# Patient Record
Sex: FEMALE | Race: WHITE | NOT HISPANIC OR LATINO | Employment: OTHER | URBAN - NONMETROPOLITAN AREA
[De-identification: names, ages, dates, MRNs, and addresses within clinical notes are randomized per-mention and may not be internally consistent; named-entity substitution may affect disease eponyms.]

---

## 2017-07-26 ENCOUNTER — TRANSCRIBE ORDERS (OUTPATIENT)
Dept: GENERAL RADIOLOGY | Facility: HOSPITAL | Age: 82
End: 2017-07-26

## 2017-07-26 ENCOUNTER — HOSPITAL ENCOUNTER (OUTPATIENT)
Dept: GENERAL RADIOLOGY | Facility: HOSPITAL | Age: 82
Discharge: HOME | End: 2017-07-26
Admitting: PAIN MEDICINE

## 2017-07-26 ENCOUNTER — HOSPITAL ENCOUNTER (OUTPATIENT)
Dept: GENERAL RADIOLOGY | Facility: HOSPITAL | Age: 82
Discharge: HOME | End: 2017-07-26
Admitting: INTERNAL MEDICINE

## 2017-07-26 DIAGNOSIS — M51.36 DEGENERATION OF LUMBAR INTERVERTEBRAL DISC: ICD-10-CM

## 2017-07-26 DIAGNOSIS — M47.816 FACET DEGENERATION OF LUMBAR REGION: Primary | ICD-10-CM

## 2017-07-26 DIAGNOSIS — Z78.0 ASYMPTOMATIC POSTMENOPAUSAL STATUS (AGE-RELATED) (NATURAL): Primary | ICD-10-CM

## 2017-07-26 PROCEDURE — 72110 X-RAY EXAM L-2 SPINE 4/>VWS: CPT

## 2017-08-25 ENCOUNTER — OFFICE VISIT (OUTPATIENT)
Dept: PODIATRY | Facility: CLINIC | Age: 82
End: 2017-08-25

## 2017-08-25 VITALS
HEART RATE: 68 BPM | BODY MASS INDEX: 28.48 KG/M2 | SYSTOLIC BLOOD PRESSURE: 122 MMHG | DIASTOLIC BLOOD PRESSURE: 70 MMHG | WEIGHT: 132 LBS | HEIGHT: 57 IN

## 2017-08-25 DIAGNOSIS — G61.0 GUILLAIN BARRÉ SYNDROME (HCC): ICD-10-CM

## 2017-08-25 DIAGNOSIS — G62.89 OTHER POLYNEUROPATHY: Primary | ICD-10-CM

## 2017-08-25 PROCEDURE — 99203 OFFICE O/P NEW LOW 30 MIN: CPT | Performed by: PODIATRIST

## 2017-08-25 RX ORDER — ALPRAZOLAM 0.25 MG/1
0.25 TABLET ORAL 3 TIMES DAILY PRN
COMMUNITY
End: 2019-09-05 | Stop reason: ALTCHOICE

## 2017-08-25 NOTE — PATIENT INSTRUCTIONS
Peripheral Neuropathy  Peripheral neuropathy is a type of nerve damage. It affects nerves that carry signals between the spinal cord and other parts of the body. These are called peripheral nerves. With peripheral neuropathy, one nerve or a group of nerves may be damaged.   CAUSES   Many things can damage peripheral nerves. For some people with peripheral neuropathy, the cause is unknown. Some causes include:  · Diabetes. This is the most common cause of peripheral neuropathy.  · Injury to a nerve.  · Pressure or stress on a nerve that lasts a long time.  · Too little vitamin B. Alcoholism can lead to this.  · Infections.  · Autoimmune diseases, such as multiple sclerosis and systemic lupus erythematosus.  · Inherited nerve diseases.  · Some medicines, such as cancer drugs.  · Toxic substances, such as lead and mercury.  · Too little blood flowing to the legs.  · Kidney disease.  · Thyroid disease.  SIGNS AND SYMPTOMS   Different people have different symptoms. The symptoms you have will depend on which of your nerves is damaged.  Common symptoms include:  · Loss of feeling (numbness) in the feet and hands.  · Tingling in the feet and hands.  · Pain that burns.  · Very sensitive skin.  · Weakness.  · Not being able to move a part of the body (paralysis).  · Muscle twitching.  · Clumsiness or poor coordination.  · Loss of balance.  · Not being able to control your bladder.  · Feeling dizzy.  · Sexual problems.  DIAGNOSIS   Peripheral neuropathy is a symptom, not a disease. Finding the cause of peripheral neuropathy can be hard. To figure that out, your health care provider will take a medical history and do a physical exam. A neurological exam will also be done. This involves checking things affected by your brain, spinal cord, and nerves (nervous system). For example, your health care provider will check your reflexes, how you move, and what you can feel.   Other types of tests may also be ordered, such as:  · Blood  tests.  · A test of the fluid in your spinal cord.  · Imaging tests, such as CT scans or an MRI.  · Electromyography (EMG). This test checks the nerves that control muscles.  · Nerve conduction velocity tests. These tests check how fast messages pass through your nerves.  · Nerve biopsy. A small piece of nerve is removed. It is then checked under a microscope.  TREATMENT   · Medicine is often used to treat peripheral neuropathy. Medicines may include:    Pain-relieving medicines. Prescription or over-the-counter medicine may be suggested.    Antiseizure medicine. This may be used for pain.    Antidepressants. These also may help ease pain from neuropathy.    Lidocaine. This is a numbing medicine. You might wear a patch or be given a shot.    Mexiletine. This medicine is typically used to help control irregular heart rhythms.  · Surgery. Surgery may be needed to relieve pressure on a nerve or to destroy a nerve that is causing pain.  · Physical therapy to help movement.  · Assistive devices to help movement.  HOME CARE INSTRUCTIONS   · Only take over-the-counter or prescription medicines as directed by your health care provider. Follow the instructions carefully for any given medicines. Do not take any other medicines without first getting approval from your health care provider.  · If you have diabetes, work closely with your health care provider to keep your blood sugar under control.  · If you have numbness in your feet:  ¨ Check every day for signs of injury or infection. Watch for redness, warmth, and swelling.  ¨ Wear padded socks and comfortable shoes. These help protect your feet.  · Do not do things that put pressure on your damaged nerve.  · Do not smoke. Smoking keeps blood from getting to damaged nerves.  · Avoid or limit alcohol. Too much alcohol can cause a lack of B vitamins. These vitamins are needed for healthy nerves.  · Develop a good support system. Coping with peripheral neuropathy can be  stressful. Talk to a mental health specialist or join a support group if you are struggling.  · Follow up with your health care provider as directed.  SEEK MEDICAL CARE IF:   · You have new signs or symptoms of peripheral neuropathy.  · You are struggling emotionally from dealing with peripheral neuropathy.  · You have a fever.  SEEK IMMEDIATE MEDICAL CARE IF:   · You have an injury or infection that is not healing.  · You feel very dizzy or begin vomiting.  · You have chest pain.  · You have trouble breathing.     This information is not intended to replace advice given to you by your health care provider. Make sure you discuss any questions you have with your health care provider.     Document Released: 12/08/2003 Document Revised: 04/10/2017 Document Reviewed: 08/25/2014  [employer] Interactive Patient Education ©2017 [employer]  Guillain-Cameron Syndrome  Guillain-Cameron syndrome (GBS) is a rare disorder in which your body's defense (immune) system attacks your nervous system. GBS is a kind of autoimmune disorder. GBS is not contagious and most people with GBS recover within a few months. However, some people may still have some weakness after a few years.   CAUSES   The exact cause of GBS syndrome is not known. The disorder usually develops a few days or weeks after a viral infection, such as a stomach (gastrointestinal) or breathing (respiratory) infection. Sometimes surgery or vaccinations will trigger the syndrome.   SIGNS AND SYMPTOMS   The most common signs and symptoms of GBS are tingling, numbness, and weakness in your lower legs. You may have more symptoms in other areas of your body after a few weeks. Signs and symptoms may eventually include:  · Muscle weakness that spreads from your legs to your arms and trunk.  · Difficulty breathing.  · Total loss of muscle use.  · Facial weakness.  · Double vision.  · Trouble swallowing.  · Slurred speech.  · Aching or burning pain.  · Rapid breathing and heart  rate.  · Flushing or cold and clammy skin.  · Dizziness when standing.  · Trouble passing urine or having bowel movements.  DIAGNOSIS   Your health care provider will do a physical exam to diagnose GBS. You may also have tests to confirm GBS. These may include:  · A nerve conduction velocity (NCV) test to check for slowing of nerve signals.  · A spinal tap to check for protein in the fluid around the spinal cord.  TREATMENT   Treatment focuses on relieving symptoms and speeding up recovery. The most important part of treatment is to keep your body functioning while your nervous system recovers. Severe cases of GBS may require hospitalization. Possible treatments include:   · Plasmapheresis. This is a type of blood transfusion. It removes immune system cells that are attacking your nervous system.  · Intravenous injections of special proteins (immunoglobulins or antibodies). These proteins may slow down the immune system's attack on peripheral nerves.  · Medicines to control the symptoms of GBS.  HOME CARE INSTRUCTIONS  · Physical therapy may be recommended by your health care provider. Follow your exercises as directed by your physical therapist.  · Make sure you have the support you need.  · Keep all follow-up visits as directed by your health care provider. This is important.  · Take medicines only as directed by your health care provider.  SEEK MEDICAL CARE IF:  · You have new symptoms or your symptoms get worse.  · You do not feel safe or supported at home.  SEEK IMMEDIATE MEDICAL CARE IF:  · You have trouble breathing.  · You have trouble swallowing.  · You choke after eating or drinking.  · You cannot move.  · You pass out.    MAKE SURE YOU:  · Understand these instructions.  · Watch your condition.  · Get help right away if you are not doing well or get worse.     This information is not intended to replace advice given to you by your health care provider. Make sure you discuss any questions you have with  your health care provider.     Document Released: 12/08/2003 Document Revised: 01/08/2016 Document Reviewed: 02/18/2015  [employer] Interactive Patient Education ©2017 [employer]

## 2017-08-25 NOTE — PROGRESS NOTES
[facility] - PODIATRY    Today's Date: 08/25/17    Patient Name: Isha Mccall  MRN: [id number]  CSN: [id number]  PCP: Javi Wilson MD  Referring Provider: No ref. provider found    SUBJECTIVE     Chief Complaint   Patient presents with   • Foot Pain     Complaint of bilateral foot pain     HPI: Isha Mccall, a 82 y.o.female, comes to clinic as a(n) new patient complaining of foot pain. Patient has h/o COPD, Guillain-[place], CAD, HTN, Tobacco use. States that for several years she has had numbness and tingling in her feet. No difference in pain with WB or NWB. States that she currently takes Gabapentin which helps with her pain but between pills has increased tingling. Admits pain at 6/10 level and described as numbness and tingling. Denies any constitutional symptoms. No other pedal complaints at this time.    Past Medical History:   Diagnosis Date   • COPD (chronic obstructive pulmonary disease)    • Coronary artery disease    • Foot pain, bilateral    • Hypertension    • Kidney cysts    • Tobacco abuse      Past Surgical History:   Procedure Laterality Date   • CORONARY ANGIOPLASTY WITH STENT PLACEMENT     • KNEE SURGERY Right      Family History   Problem Relation Age of Onset   • Heart disease Other    • Hypertension Other      Social History     Social History   • Marital status:      Spouse name: N/A   • Number of children: N/A   • Years of education: N/A     Occupational History   • Not on file.     Social History Main Topics   • Smoking status: Current Every Day Smoker     Packs/day: 1.00   • Smokeless tobacco: Never Used   • Alcohol use No   • Drug use: No   • Sexual activity: Defer     Other Topics Concern   • Not on file     Social History Narrative     Allergies   Allergen Reactions   • Lisinopril    • Penicillins      Current Outpatient Prescriptions   Medication Sig Dispense Refill   • ALPRAZolam (XANAX) 0.25 MG tablet Take 0.25 mg by mouth As Needed for Anxiety.      • carvedilol (COREG) 25 MG tablet Take 25 mg by mouth 2 (Two) Times a Day With Meals.     • cyclobenzaprine (FLEXERIL) 10 MG tablet Take 10 mg by mouth 3 (Three) Times a Day As Needed for Muscle Spasms.     • furosemide (LASIX) 20 MG tablet Take 20 mg by mouth 2 (Two) Times a Day.     • gabapentin (NEURONTIN) 600 MG tablet Take 600 mg by mouth 3 (Three) Times a Day.     • hydrochlorothiazide (HYDRODIURIL) 12.5 MG tablet Take 12.5 mg by mouth Daily.     • ipratropium-albuterol (COMBIVENT RESPIMAT)  MCG/ACT inhaler Inhale 1 puff.       No current facility-administered medications for this visit.      Review of Systems   Constitutional: Negative for chills and fever.   HENT: Negative for congestion.    Respiratory: Negative for shortness of breath.    Cardiovascular: Negative for chest pain and leg swelling.   Gastrointestinal: Negative for constipation, diarrhea, nausea and vomiting.   Skin: Negative for wound.   Neurological: Positive for numbness.       OBJECTIVE     Vitals:    08/25/17 1024   BP: 122/70   Pulse: 68       PHYSICAL EXAM  GEN:   A&Ox3, NAD. Pt presents to clinic ambulating with walker and wearing Casual Shoes.      NEURO:   Protective sensation intact to 10/10 sites Right foot, 9/10 site Left foot using [place] monofilament  Light touch sensation diminished  No Tinel's or [place] sign.    VASC:  Skin temperature Warm to Warm proximal to distal ghazal  DP pulses 2/4 Right, 2/4 Left  PT pulses 2/4 Right, 2/4 Left  CFT 4 sec ghazal  Pedal hair growth absent    MUSC/SKEL:  Muscle Strength Right foot Dorsiflexors 5/5, Plantarflexors 5/5, Evertors 5/5, Invertors 5/5  Muscle Strength Left foot Dorsiflexors 5/5, Plantarflexors 5/5, Evertors 5/5, Invertors 5/5  ROM of the 1st MTP is full without pain or crepitus  ROM of the MTJ is full without pain or crepitus    ROM of the STJ is full without pain or crepitus    ROM of the ankle joint is full without pain or crepitus    No pinpoint POP during exam.  Points to plantar foot to site of numbness and tingling   Rectus foot type   Gait pattern: Normal    DERM:  Pedal nails x10 are thickened and discolored but within normal limits of length  Webspaces are Clean, Dry, and Intact  Skin is normal in turgor and texture with no open wounds or sores appreciated.      RADIOLOGY/NUCLEAR:  Dexa Bone Density Axial    Result Date: 7/26/2017  Narrative: EXAMINATION: DEXA BONE DENSITY AXIAL-  7/26/2017 3:50 PM CDT  History:Osteoporosis screening  Bone densitometry has been performed using a Holo[employer]est[employer] Discovery C Model bone densitometer. The routine evaluation includes the lumbar spine and left hip.  The calculated bone density of the femoral neck is 0.533 g/cm2 which corresponds to a T-score of  -2.8 and a Z-score of -0.5.  The T-score corresponds to the number of standard deviations above or below the standard of a 30 year old patient.  The Z-score refers to the number of standard deviations above or below the mean for a person of the same age as this patient.  Evaluation of the lumbar spine demonstrates an average bone mineral density measurement of 0.977 g/cm2 which corresponds to a T-score of -0.6 and a Z-score of 2.1  Comparison: None      Impression: Impression:  1. Osteoporosis. The bone density is more than 2.5 standard deviations below the mean for a young adult woman. There is significant increased fracture risk. This report was finalized on 07/26/2017 15:51 by Dr. Jose Antonio León MD.    Xr Spine Lumbar 4+ View    Result Date: 7/26/2017  Narrative: EXAMINATION: XR SPINE LUMBAR 4+ VW-  7/26/2017 3:51 PM CDT  HISTORY: Back pain  COMPARISON:None  TECHNIQUE:5 views  FINDINGS:  Osteoporosis observed.  There is mild dextroscoliotic change of the lumbar spine.  There is endplate sclerosis and osteophyte formation with mild disc space narrowing particularly in the lower lumbar spine, L4-L5 and L5-S1.  Very mild anterolisthesis of L4 on L5 suspected.  There is no acute fracture or  subluxation.  Facet arthropathy with hypertrophic changes observed particularly in the lower lumbar spine, L5-S1 and L4-L5.  Atherosclerotic aortic calcifications observed      Impression: 1. Osteoporosis. 2. Scoliotic and degenerative changes. 3. No acute bony abnormality This report was finalized on 07/26/2017 15:52 by Dr. Jose Antonio León MD.      LABORATORY/CULTURE RESULTS:      PATHOLOGY RESULTS:       ASSESSMENT/PLAN     Isha was seen today for foot pain.    Diagnoses and all orders for this visit:    Other polyneuropathy    Guillain Barré syndrome    Comprehensive lower extremity examination and evaluation was performed.  Discussed findings and treatment plan including risks, benefits, and treatment options with patient in detail. Patient agreed with treatment plan.  Explained to patient that neuropathy can be difficult to treat and in the presence of Buillain [place] may not be treatable   Rx: Compounded pain cream  An After Visit Summary was printed and given to the patient at discharge, including (if requested) any available informative/educational handouts regarding diagnosis, treatment, or medications. All questions were answered to patient/family satisfaction. Should symptoms fail to improve or worsen they agree to call or return to clinic or to go to the Emergency Department. Discussed the importance of following up with any needed screening tests/labs/specialist appointments and any requested follow-up recommended by me today. Importance of maintaining follow-up discussed and patient accepts that missed appointments can delay diagnosis and potentially lead to worsening of conditions.  Return if symptoms worsen or fail to improve., or sooner if acute issues arise.        This document has been electronically signed by Chivo Wilson DPM on August 25, 2017 2:36 PM

## 2017-09-28 ENCOUNTER — TRANSCRIBE ORDERS (OUTPATIENT)
Dept: ADMINISTRATIVE | Facility: HOSPITAL | Age: 82
End: 2017-09-28

## 2017-09-28 DIAGNOSIS — Z12.31 ENCOUNTER FOR SCREENING MAMMOGRAM FOR MALIGNANT NEOPLASM OF BREAST: Primary | ICD-10-CM

## 2017-10-23 ENCOUNTER — APPOINTMENT (OUTPATIENT)
Dept: CT IMAGING | Facility: HOSPITAL | Age: 82
End: 2017-10-23

## 2017-10-23 ENCOUNTER — APPOINTMENT (OUTPATIENT)
Dept: GENERAL RADIOLOGY | Facility: HOSPITAL | Age: 82
End: 2017-10-23

## 2017-10-23 ENCOUNTER — HOSPITAL ENCOUNTER (EMERGENCY)
Facility: HOSPITAL | Age: 82
Discharge: HOME | End: 2017-10-23
Admitting: EMERGENCY MEDICINE

## 2017-10-23 VITALS
DIASTOLIC BLOOD PRESSURE: 71 MMHG | OXYGEN SATURATION: 96 % | WEIGHT: 125 LBS | HEIGHT: 57 IN | BODY MASS INDEX: 26.97 KG/M2 | SYSTOLIC BLOOD PRESSURE: 118 MMHG | RESPIRATION RATE: 16 BRPM | TEMPERATURE: 97.4 F | HEART RATE: 60 BPM

## 2017-10-23 DIAGNOSIS — R11.2 NAUSEA AND VOMITING, INTRACTABILITY OF VOMITING NOT SPECIFIED, UNSPECIFIED VOMITING TYPE: Primary | ICD-10-CM

## 2017-10-23 LAB
ALBUMIN SERPL-MCNC: 3.2 G/DL (ref 3.5–5)
ALBUMIN/GLOB SERPL: 1.1 G/DL (ref 1.1–2.5)
ALP SERPL-CCNC: 70 U/L (ref 24–120)
ALT SERPL W P-5'-P-CCNC: 18 U/L (ref 0–54)
AMYLASE SERPL-CCNC: <30 U/L (ref 30–110)
ANION GAP SERPL CALCULATED.3IONS-SCNC: 8 MMOL/L (ref 4–13)
AST SERPL-CCNC: 13 U/L (ref 7–45)
BASOPHILS # BLD AUTO: 0.02 10*3/MM3 (ref 0–0.2)
BASOPHILS NFR BLD AUTO: 0.2 % (ref 0–2)
BILIRUB SERPL-MCNC: 0.3 MG/DL (ref 0.1–1)
BILIRUB UR QL STRIP: NEGATIVE
BUN BLD-MCNC: 25 MG/DL (ref 5–21)
BUN/CREAT SERPL: 23.1 (ref 7–25)
CALCIUM SPEC-SCNC: 8.3 MG/DL (ref 8.4–10.4)
CHLORIDE SERPL-SCNC: 95 MMOL/L (ref 98–110)
CLARITY UR: CLEAR
CO2 SERPL-SCNC: 37 MMOL/L (ref 24–31)
COLOR UR: YELLOW
CREAT BLD-MCNC: 1.08 MG/DL (ref 0.5–1.4)
DEPRECATED RDW RBC AUTO: 52.7 FL (ref 40–54)
EOSINOPHIL # BLD AUTO: 0.17 10*3/MM3 (ref 0–0.7)
EOSINOPHIL NFR BLD AUTO: 2 % (ref 0–4)
ERYTHROCYTE [DISTWIDTH] IN BLOOD BY AUTOMATED COUNT: 15.1 % (ref 12–15)
GFR SERPL CREATININE-BSD FRML MDRD: 49 ML/MIN/1.73
GLOBULIN UR ELPH-MCNC: 3 GM/DL
GLUCOSE BLD-MCNC: 102 MG/DL (ref 70–100)
GLUCOSE UR STRIP-MCNC: NEGATIVE MG/DL
HCT VFR BLD AUTO: 48.2 % (ref 37–47)
HGB BLD-MCNC: 15.6 G/DL (ref 12–16)
HGB UR QL STRIP.AUTO: NEGATIVE
HOLD SPECIMEN: NORMAL
HOLD SPECIMEN: NORMAL
IMM GRANULOCYTES # BLD: 0.02 10*3/MM3 (ref 0–0.03)
IMM GRANULOCYTES NFR BLD: 0.2 % (ref 0–5)
KETONES UR QL STRIP: NEGATIVE
LEUKOCYTE ESTERASE UR QL STRIP.AUTO: NEGATIVE
LIPASE SERPL-CCNC: 38 U/L (ref 23–203)
LYMPHOCYTES # BLD AUTO: 2.09 10*3/MM3 (ref 0.72–4.86)
LYMPHOCYTES NFR BLD AUTO: 24.9 % (ref 15–45)
MCH RBC QN AUTO: 31.3 PG (ref 28–32)
MCHC RBC AUTO-ENTMCNC: 32.4 G/DL (ref 33–36)
MCV RBC AUTO: 96.8 FL (ref 82–98)
MONOCYTES # BLD AUTO: 0.72 10*3/MM3 (ref 0.19–1.3)
MONOCYTES NFR BLD AUTO: 8.6 % (ref 4–12)
NEUTROPHILS # BLD AUTO: 5.37 10*3/MM3 (ref 1.87–8.4)
NEUTROPHILS NFR BLD AUTO: 64.1 % (ref 39–78)
NITRITE UR QL STRIP: NEGATIVE
PH UR STRIP.AUTO: 6 [PH] (ref 5–8)
PLATELET # BLD AUTO: 226 10*3/MM3 (ref 130–400)
PMV BLD AUTO: 10.6 FL (ref 6–12)
POTASSIUM BLD-SCNC: 3.2 MMOL/L (ref 3.5–5.3)
PROT SERPL-MCNC: 6.2 G/DL (ref 6.3–8.7)
PROT UR QL STRIP: NEGATIVE
RBC # BLD AUTO: 4.98 10*6/MM3 (ref 4.2–5.4)
SODIUM BLD-SCNC: 140 MMOL/L (ref 135–145)
SP GR UR STRIP: >1.03 (ref 1–1.03)
UROBILINOGEN UR QL STRIP: ABNORMAL
WBC NRBC COR # BLD: 8.39 10*3/MM3 (ref 4.8–10.8)
WHOLE BLOOD HOLD SPECIMEN: NORMAL
WHOLE BLOOD HOLD SPECIMEN: NORMAL

## 2017-10-23 PROCEDURE — 80053 COMPREHEN METABOLIC PANEL: CPT | Performed by: PHYSICIAN ASSISTANT

## 2017-10-23 PROCEDURE — 25010000002 ONDANSETRON PER 1 MG: Performed by: PHYSICIAN ASSISTANT

## 2017-10-23 PROCEDURE — 83690 ASSAY OF LIPASE: CPT | Performed by: PHYSICIAN ASSISTANT

## 2017-10-23 PROCEDURE — 71010 HC CHEST PA OR AP: CPT

## 2017-10-23 PROCEDURE — 81003 URINALYSIS AUTO W/O SCOPE: CPT | Performed by: PHYSICIAN ASSISTANT

## 2017-10-23 PROCEDURE — 94799 UNLISTED PULMONARY SVC/PX: CPT

## 2017-10-23 PROCEDURE — 74177 CT ABD & PELVIS W/CONTRAST: CPT

## 2017-10-23 PROCEDURE — 94640 AIRWAY INHALATION TREATMENT: CPT

## 2017-10-23 PROCEDURE — 96374 THER/PROPH/DIAG INJ IV PUSH: CPT

## 2017-10-23 PROCEDURE — 0 IOPAMIDOL 61 % SOLUTION: Performed by: PHYSICIAN ASSISTANT

## 2017-10-23 PROCEDURE — 85025 COMPLETE CBC W/AUTO DIFF WBC: CPT | Performed by: PHYSICIAN ASSISTANT

## 2017-10-23 PROCEDURE — 82150 ASSAY OF AMYLASE: CPT | Performed by: PHYSICIAN ASSISTANT

## 2017-10-23 PROCEDURE — 99284 EMERGENCY DEPT VISIT MOD MDM: CPT

## 2017-10-23 PROCEDURE — 96361 HYDRATE IV INFUSION ADD-ON: CPT

## 2017-10-23 RX ORDER — ONDANSETRON 4 MG/1
4 TABLET, ORALLY DISINTEGRATING ORAL ONCE
Status: DISCONTINUED | OUTPATIENT
Start: 2017-10-23 | End: 2017-10-23

## 2017-10-23 RX ORDER — IPRATROPIUM BROMIDE AND ALBUTEROL SULFATE 2.5; .5 MG/3ML; MG/3ML
3 SOLUTION RESPIRATORY (INHALATION) ONCE
Status: COMPLETED | OUTPATIENT
Start: 2017-10-23 | End: 2017-10-23

## 2017-10-23 RX ORDER — ONDANSETRON 4 MG/1
4 TABLET, ORALLY DISINTEGRATING ORAL EVERY 8 HOURS PRN
Qty: 9 TABLET | Refills: 0 | Status: ON HOLD | OUTPATIENT
Start: 2017-10-23 | End: 2019-04-05

## 2017-10-23 RX ORDER — ONDANSETRON 2 MG/ML
INJECTION INTRAMUSCULAR; INTRAVENOUS
Status: DISCONTINUED
Start: 2017-10-23 | End: 2017-10-24 | Stop reason: HOSPADM

## 2017-10-23 RX ORDER — ONDANSETRON 2 MG/ML
4 INJECTION INTRAMUSCULAR; INTRAVENOUS ONCE
Status: COMPLETED | OUTPATIENT
Start: 2017-10-23 | End: 2017-10-23

## 2017-10-23 RX ADMIN — SODIUM CHLORIDE 1000 ML: 9 INJECTION, SOLUTION INTRAVENOUS at 20:15

## 2017-10-23 RX ADMIN — IPRATROPIUM BROMIDE AND ALBUTEROL SULFATE 3 ML: .5; 3 SOLUTION RESPIRATORY (INHALATION) at 21:32

## 2017-10-23 RX ADMIN — IOPAMIDOL 100 ML: 612 INJECTION, SOLUTION INTRAVENOUS at 19:45

## 2017-10-23 RX ADMIN — ONDANSETRON 4 MG: 2 INJECTION INTRAMUSCULAR; INTRAVENOUS at 17:40
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
## 2017-10-24 NOTE — PROGRESS NOTES
Continued Stay Note  NENITA Rodriguez     Patient Name: Isha Mccall  MRN: [id number]  Today's Date: 10/24/2017    Admit Date: 10/23/2017          Discharge Plan       10/24/17 1147    Case Management/Social Work Plan    Additional Comments Informed per Nalini PAULINO pt here last evening in ER asking for assistance.  Called and spoke with pt [phone], says she is already aware of where to apply for food stamps, medicaid etc., says she does not qualify.  Provided information on food carr in area and other resources that might be able to help with other bills.               Discharge Codes     None            ROBIN Brito

## 2017-12-04 ENCOUNTER — HOSPITAL ENCOUNTER (OUTPATIENT)
Dept: GENERAL RADIOLOGY | Facility: HOSPITAL | Age: 82
Discharge: HOME | End: 2017-12-04
Attending: EMERGENCY MEDICINE | Admitting: EMERGENCY MEDICINE

## 2017-12-04 ENCOUNTER — TRANSCRIBE ORDERS (OUTPATIENT)
Dept: GENERAL RADIOLOGY | Facility: HOSPITAL | Age: 82
End: 2017-12-04

## 2017-12-04 DIAGNOSIS — S69.90XA INJURY, FOREARM AND WRIST, UNSPECIFIED LATERALITY, INITIAL ENCOUNTER: ICD-10-CM
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
## 2017-12-04 DIAGNOSIS — S59.919A INJURY, FOREARM AND WRIST, UNSPECIFIED LATERALITY, INITIAL ENCOUNTER: ICD-10-CM

## 2017-12-04 DIAGNOSIS — M79.671 FOOT PAIN, RIGHT: ICD-10-CM

## 2017-12-04 PROCEDURE — 73630 X-RAY EXAM OF FOOT: CPT

## 2017-12-04 PROCEDURE — 73130 X-RAY EXAM OF HAND: CPT

## 2019-01-01 ENCOUNTER — APPOINTMENT (OUTPATIENT)
Dept: NUCLEAR MEDICINE | Age: 84
DRG: 280 | End: 2019-01-01
Attending: INTERNAL MEDICINE
Payer: MEDICARE

## 2019-01-01 ENCOUNTER — APPOINTMENT (OUTPATIENT)
Dept: GENERAL RADIOLOGY | Age: 84
DRG: 247 | End: 2019-01-01
Attending: FAMILY MEDICINE
Payer: MEDICARE

## 2019-01-01 ENCOUNTER — CARE COORDINATION (OUTPATIENT)
Dept: CASE MANAGEMENT | Age: 84
End: 2019-01-01

## 2019-01-01 ENCOUNTER — APPOINTMENT (OUTPATIENT)
Dept: GENERAL RADIOLOGY | Age: 84
DRG: 280 | End: 2019-01-01
Attending: INTERNAL MEDICINE
Payer: MEDICARE

## 2019-01-01 ENCOUNTER — HOSPITAL ENCOUNTER (INPATIENT)
Age: 84
LOS: 4 days | Discharge: NURSING, REHAB OR LONG-TERM CARE | DRG: 280 | End: 2019-12-24
Attending: INTERNAL MEDICINE | Admitting: HOSPITALIST
Payer: MEDICARE

## 2019-01-01 ENCOUNTER — APPOINTMENT (OUTPATIENT)
Dept: CT IMAGING | Age: 84
DRG: 280 | End: 2019-01-01
Attending: INTERNAL MEDICINE
Payer: MEDICARE

## 2019-01-01 ENCOUNTER — APPOINTMENT (OUTPATIENT)
Dept: ULTRASOUND IMAGING | Age: 84
DRG: 247 | End: 2019-01-01
Attending: FAMILY MEDICINE
Payer: MEDICARE

## 2019-01-01 ENCOUNTER — HOSPITAL ENCOUNTER (INPATIENT)
Age: 84
LOS: 15 days | Discharge: NURSING, REHAB OR LONG-TERM CARE | DRG: 247 | End: 2019-12-18
Attending: FAMILY MEDICINE | Admitting: HOSPITALIST
Payer: MEDICARE

## 2019-01-01 VITALS
BODY MASS INDEX: 23.56 KG/M2 | HEART RATE: 68 BPM | TEMPERATURE: 97.5 F | RESPIRATION RATE: 18 BRPM | OXYGEN SATURATION: 90 % | HEIGHT: 60 IN | DIASTOLIC BLOOD PRESSURE: 54 MMHG | SYSTOLIC BLOOD PRESSURE: 97 MMHG | WEIGHT: 120 LBS

## 2019-01-01 VITALS
DIASTOLIC BLOOD PRESSURE: 69 MMHG | HEIGHT: 62 IN | TEMPERATURE: 98.2 F | SYSTOLIC BLOOD PRESSURE: 134 MMHG | RESPIRATION RATE: 16 BRPM | HEART RATE: 55 BPM | WEIGHT: 124.4 LBS | BODY MASS INDEX: 22.89 KG/M2 | OXYGEN SATURATION: 99 %

## 2019-01-01 DIAGNOSIS — G89.29 OTHER CHRONIC PAIN: Primary | ICD-10-CM

## 2019-01-01 DIAGNOSIS — G89.29 OTHER CHRONIC PAIN: ICD-10-CM

## 2019-01-01 LAB
ALBUMIN SERPL-MCNC: 3 G/DL (ref 3.5–5.2)
ALBUMIN SERPL-MCNC: 3 G/DL (ref 3.5–5.2)
ALBUMIN SERPL-MCNC: 3.1 G/DL (ref 3.5–5.2)
ALBUMIN SERPL-MCNC: 3.5 G/DL (ref 3.5–5.2)
ALP BLD-CCNC: 61 U/L (ref 35–104)
ALP BLD-CCNC: 63 U/L (ref 35–104)
ALP BLD-CCNC: 63 U/L (ref 35–104)
ALP BLD-CCNC: 71 U/L (ref 35–104)
ALT SERPL-CCNC: 15 U/L (ref 5–33)
ALT SERPL-CCNC: 18 U/L (ref 5–33)
ALT SERPL-CCNC: 22 U/L (ref 5–33)
ALT SERPL-CCNC: 27 U/L (ref 5–33)
ANION GAP SERPL CALCULATED.3IONS-SCNC: 10 MMOL/L (ref 7–19)
ANION GAP SERPL CALCULATED.3IONS-SCNC: 11 MMOL/L (ref 7–19)
ANION GAP SERPL CALCULATED.3IONS-SCNC: 11 MMOL/L (ref 7–19)
ANION GAP SERPL CALCULATED.3IONS-SCNC: 12 MMOL/L (ref 7–19)
ANION GAP SERPL CALCULATED.3IONS-SCNC: 14 MMOL/L (ref 7–19)
ANION GAP SERPL CALCULATED.3IONS-SCNC: 15 MMOL/L (ref 7–19)
ANION GAP SERPL CALCULATED.3IONS-SCNC: 15 MMOL/L (ref 7–19)
ANION GAP SERPL CALCULATED.3IONS-SCNC: 16 MMOL/L (ref 7–19)
ANION GAP SERPL CALCULATED.3IONS-SCNC: 7 MMOL/L (ref 7–19)
ANION GAP SERPL CALCULATED.3IONS-SCNC: 8 MMOL/L (ref 7–19)
ANION GAP SERPL CALCULATED.3IONS-SCNC: 8 MMOL/L (ref 7–19)
ANION GAP SERPL CALCULATED.3IONS-SCNC: 9 MMOL/L (ref 7–19)
ANION GAP SERPL CALCULATED.3IONS-SCNC: 9 MMOL/L (ref 7–19)
APTT: 125.7 SEC (ref 26–36.2)
APTT: 134.5 SEC (ref 26–36.2)
APTT: 27.5 SEC (ref 26–36.2)
APTT: 44.7 SEC (ref 26–36.2)
APTT: 50.5 SEC (ref 26–36.2)
APTT: 52.5 SEC (ref 26–36.2)
APTT: 52.7 SEC (ref 26–36.2)
APTT: 53.5 SEC (ref 26–36.2)
APTT: 54.2 SEC (ref 26–36.2)
APTT: 55.5 SEC (ref 26–36.2)
APTT: 58.7 SEC (ref 26–36.2)
APTT: 60 SEC (ref 26–36.2)
APTT: 60.2 SEC (ref 26–36.2)
APTT: 60.7 SEC (ref 26–36.2)
APTT: 62.4 SEC (ref 26–36.2)
APTT: 91.7 SEC (ref 26–36.2)
APTT: >200 SEC (ref 26–36.2)
AST SERPL-CCNC: 14 U/L (ref 5–32)
AST SERPL-CCNC: 17 U/L (ref 5–32)
AST SERPL-CCNC: 17 U/L (ref 5–32)
AST SERPL-CCNC: 18 U/L (ref 5–32)
BACTERIA: ABNORMAL /HPF
BANDED NEUTROPHILS RELATIVE PERCENT: 1 % (ref 0–5)
BANDED NEUTROPHILS RELATIVE PERCENT: 1 % (ref 0–5)
BANDED NEUTROPHILS RELATIVE PERCENT: 2 % (ref 0–5)
BASOPHILS ABSOLUTE: 0 K/UL (ref 0–0.2)
BASOPHILS ABSOLUTE: 0.1 K/UL (ref 0–0.2)
BASOPHILS RELATIVE PERCENT: 0 % (ref 0–1)
BASOPHILS RELATIVE PERCENT: 0 % (ref 0–1)
BASOPHILS RELATIVE PERCENT: 0.1 % (ref 0–1)
BASOPHILS RELATIVE PERCENT: 0.2 % (ref 0–1)
BASOPHILS RELATIVE PERCENT: 0.3 % (ref 0–1)
BASOPHILS RELATIVE PERCENT: 0.3 % (ref 0–1)
BASOPHILS RELATIVE PERCENT: 0.4 % (ref 0–1)
BASOPHILS RELATIVE PERCENT: 0.4 % (ref 0–1)
BASOPHILS RELATIVE PERCENT: 0.5 % (ref 0–1)
BASOPHILS RELATIVE PERCENT: 0.6 % (ref 0–1)
BASOPHILS RELATIVE PERCENT: 0.6 % (ref 0–1)
BASOPHILS RELATIVE PERCENT: 1 % (ref 0–1)
BASOPHILS RELATIVE PERCENT: 2 % (ref 0–1)
BILIRUB SERPL-MCNC: 0.3 MG/DL (ref 0.2–1.2)
BILIRUB SERPL-MCNC: 0.4 MG/DL (ref 0.2–1.2)
BILIRUB SERPL-MCNC: 0.4 MG/DL (ref 0.2–1.2)
BILIRUB SERPL-MCNC: 0.5 MG/DL (ref 0.2–1.2)
BILIRUBIN URINE: NEGATIVE
BLOOD CULTURE, ROUTINE: NORMAL
BLOOD, URINE: ABNORMAL
BLOOD, URINE: NEGATIVE
BLOOD, URINE: NEGATIVE
BUN BLDV-MCNC: 14 MG/DL (ref 8–23)
BUN BLDV-MCNC: 16 MG/DL (ref 8–23)
BUN BLDV-MCNC: 16 MG/DL (ref 8–23)
BUN BLDV-MCNC: 21 MG/DL (ref 8–23)
BUN BLDV-MCNC: 26 MG/DL (ref 8–23)
BUN BLDV-MCNC: 27 MG/DL (ref 8–23)
BUN BLDV-MCNC: 27 MG/DL (ref 8–23)
BUN BLDV-MCNC: 28 MG/DL (ref 8–23)
BUN BLDV-MCNC: 30 MG/DL (ref 8–23)
BUN BLDV-MCNC: 34 MG/DL (ref 8–23)
BUN BLDV-MCNC: 36 MG/DL (ref 8–23)
BUN BLDV-MCNC: 37 MG/DL (ref 8–23)
BUN BLDV-MCNC: 38 MG/DL (ref 8–23)
BUN BLDV-MCNC: 41 MG/DL (ref 8–23)
BUN BLDV-MCNC: 44 MG/DL (ref 8–23)
CALCIUM SERPL-MCNC: 8.2 MG/DL (ref 8.8–10.2)
CALCIUM SERPL-MCNC: 8.2 MG/DL (ref 8.8–10.2)
CALCIUM SERPL-MCNC: 8.3 MG/DL (ref 8.8–10.2)
CALCIUM SERPL-MCNC: 8.4 MG/DL (ref 8.8–10.2)
CALCIUM SERPL-MCNC: 8.4 MG/DL (ref 8.8–10.2)
CALCIUM SERPL-MCNC: 8.5 MG/DL (ref 8.8–10.2)
CALCIUM SERPL-MCNC: 8.7 MG/DL (ref 8.8–10.2)
CALCIUM SERPL-MCNC: 8.8 MG/DL (ref 8.8–10.2)
CALCIUM SERPL-MCNC: 8.9 MG/DL (ref 8.8–10.2)
CALCIUM SERPL-MCNC: 8.9 MG/DL (ref 8.8–10.2)
CALCIUM SERPL-MCNC: 9 MG/DL (ref 8.8–10.2)
CALCIUM SERPL-MCNC: 9.1 MG/DL (ref 8.8–10.2)
CALCIUM SERPL-MCNC: 9.1 MG/DL (ref 8.8–10.2)
CALCIUM SERPL-MCNC: 9.2 MG/DL (ref 8.8–10.2)
CALCIUM SERPL-MCNC: 9.4 MG/DL (ref 8.8–10.2)
CALCIUM SERPL-MCNC: 9.6 MG/DL (ref 8.8–10.2)
CHLORIDE BLD-SCNC: 100 MMOL/L (ref 98–111)
CHLORIDE BLD-SCNC: 102 MMOL/L (ref 98–111)
CHLORIDE BLD-SCNC: 103 MMOL/L (ref 98–111)
CHLORIDE BLD-SCNC: 105 MMOL/L (ref 98–111)
CHLORIDE BLD-SCNC: 107 MMOL/L (ref 98–111)
CHLORIDE BLD-SCNC: 108 MMOL/L (ref 98–111)
CHLORIDE BLD-SCNC: 92 MMOL/L (ref 98–111)
CHLORIDE BLD-SCNC: 96 MMOL/L (ref 98–111)
CHLORIDE BLD-SCNC: 96 MMOL/L (ref 98–111)
CHLORIDE BLD-SCNC: 97 MMOL/L (ref 98–111)
CHLORIDE BLD-SCNC: 98 MMOL/L (ref 98–111)
CHLORIDE BLD-SCNC: 98 MMOL/L (ref 98–111)
CHLORIDE BLD-SCNC: 99 MMOL/L (ref 98–111)
CHLORIDE BLD-SCNC: 99 MMOL/L (ref 98–111)
CHOLESTEROL, TOTAL: 229 MG/DL (ref 160–199)
CLARITY: ABNORMAL
CLARITY: ABNORMAL
CLARITY: CLEAR
CO2: 22 MMOL/L (ref 22–29)
CO2: 23 MMOL/L (ref 22–29)
CO2: 23 MMOL/L (ref 22–29)
CO2: 24 MMOL/L (ref 22–29)
CO2: 25 MMOL/L (ref 22–29)
CO2: 26 MMOL/L (ref 22–29)
CO2: 27 MMOL/L (ref 22–29)
CO2: 27 MMOL/L (ref 22–29)
CO2: 28 MMOL/L (ref 22–29)
CO2: 28 MMOL/L (ref 22–29)
CO2: 29 MMOL/L (ref 22–29)
CO2: 30 MMOL/L (ref 22–29)
CO2: 30 MMOL/L (ref 22–29)
CO2: 31 MMOL/L (ref 22–29)
CO2: 32 MMOL/L (ref 22–29)
COLOR: ABNORMAL
COLOR: YELLOW
COLOR: YELLOW
CREAT SERPL-MCNC: 0.8 MG/DL (ref 0.5–0.9)
CREAT SERPL-MCNC: 0.9 MG/DL (ref 0.5–0.9)
CREAT SERPL-MCNC: 1 MG/DL (ref 0.5–0.9)
CREAT SERPL-MCNC: 1.1 MG/DL (ref 0.5–0.9)
CREAT SERPL-MCNC: 1.2 MG/DL (ref 0.5–0.9)
CREAT SERPL-MCNC: 1.3 MG/DL (ref 0.5–0.9)
CREAT SERPL-MCNC: 1.3 MG/DL (ref 0.5–0.9)
D DIMER: 0.94 UG/ML FEU (ref 0–0.48)
EKG P AXIS: 19 DEGREES
EKG P AXIS: 45 DEGREES
EKG P AXIS: 51 DEGREES
EKG P AXIS: 65 DEGREES
EKG P AXIS: 67 DEGREES
EKG P-R INTERVAL: 160 MS
EKG P-R INTERVAL: 168 MS
EKG P-R INTERVAL: 170 MS
EKG P-R INTERVAL: 176 MS
EKG P-R INTERVAL: 184 MS
EKG Q-T INTERVAL: 452 MS
EKG Q-T INTERVAL: 460 MS
EKG Q-T INTERVAL: 476 MS
EKG Q-T INTERVAL: 502 MS
EKG Q-T INTERVAL: 514 MS
EKG QRS DURATION: 146 MS
EKG QRS DURATION: 150 MS
EKG QRS DURATION: 150 MS
EKG QRS DURATION: 156 MS
EKG QRS DURATION: 156 MS
EKG QTC CALCULATION (BAZETT): 476 MS
EKG QTC CALCULATION (BAZETT): 479 MS
EKG QTC CALCULATION (BAZETT): 486 MS
EKG QTC CALCULATION (BAZETT): 501 MS
EKG QTC CALCULATION (BAZETT): 507 MS
EKG T AXIS: 119 DEGREES
EKG T AXIS: 121 DEGREES
EKG T AXIS: 125 DEGREES
EKG T AXIS: 130 DEGREES
EKG T AXIS: 81 DEGREES
EOSINOPHILS ABSOLUTE: 0 K/UL (ref 0–0.6)
EOSINOPHILS ABSOLUTE: 0.1 K/UL (ref 0–0.6)
EOSINOPHILS ABSOLUTE: 0.2 K/UL (ref 0–0.6)
EOSINOPHILS RELATIVE PERCENT: 0 % (ref 0–5)
EOSINOPHILS RELATIVE PERCENT: 0.1 % (ref 0–5)
EOSINOPHILS RELATIVE PERCENT: 0.2 % (ref 0–5)
EOSINOPHILS RELATIVE PERCENT: 0.3 % (ref 0–5)
EOSINOPHILS RELATIVE PERCENT: 0.4 % (ref 0–5)
EOSINOPHILS RELATIVE PERCENT: 0.4 % (ref 0–5)
EOSINOPHILS RELATIVE PERCENT: 0.5 % (ref 0–5)
EOSINOPHILS RELATIVE PERCENT: 0.6 % (ref 0–5)
EOSINOPHILS RELATIVE PERCENT: 1 % (ref 0–5)
EOSINOPHILS RELATIVE PERCENT: 1 % (ref 0–5)
EOSINOPHILS RELATIVE PERCENT: 1.4 % (ref 0–5)
EOSINOPHILS RELATIVE PERCENT: 1.6 % (ref 0–5)
EOSINOPHILS RELATIVE PERCENT: 1.7 % (ref 0–5)
EPITHELIAL CELLS, UA: 5 /HPF (ref 0–5)
GFR NON-AFRICAN AMERICAN: 39
GFR NON-AFRICAN AMERICAN: 39
GFR NON-AFRICAN AMERICAN: 43
GFR NON-AFRICAN AMERICAN: 47
GFR NON-AFRICAN AMERICAN: 53
GFR NON-AFRICAN AMERICAN: 60
GFR NON-AFRICAN AMERICAN: >60
GLUCOSE BLD-MCNC: 100 MG/DL (ref 74–109)
GLUCOSE BLD-MCNC: 100 MG/DL (ref 74–109)
GLUCOSE BLD-MCNC: 103 MG/DL (ref 74–109)
GLUCOSE BLD-MCNC: 103 MG/DL (ref 74–109)
GLUCOSE BLD-MCNC: 113 MG/DL (ref 74–109)
GLUCOSE BLD-MCNC: 113 MG/DL (ref 74–109)
GLUCOSE BLD-MCNC: 114 MG/DL (ref 74–109)
GLUCOSE BLD-MCNC: 116 MG/DL (ref 74–109)
GLUCOSE BLD-MCNC: 116 MG/DL (ref 74–109)
GLUCOSE BLD-MCNC: 117 MG/DL (ref 74–109)
GLUCOSE BLD-MCNC: 120 MG/DL (ref 74–109)
GLUCOSE BLD-MCNC: 126 MG/DL (ref 74–109)
GLUCOSE BLD-MCNC: 126 MG/DL (ref 74–109)
GLUCOSE BLD-MCNC: 127 MG/DL (ref 70–99)
GLUCOSE BLD-MCNC: 133 MG/DL (ref 74–109)
GLUCOSE BLD-MCNC: 137 MG/DL (ref 74–109)
GLUCOSE BLD-MCNC: 149 MG/DL (ref 74–109)
GLUCOSE BLD-MCNC: 77 MG/DL (ref 74–109)
GLUCOSE BLD-MCNC: 79 MG/DL (ref 74–109)
GLUCOSE BLD-MCNC: 90 MG/DL (ref 74–109)
GLUCOSE BLD-MCNC: 90 MG/DL (ref 74–109)
GLUCOSE BLD-MCNC: 91 MG/DL (ref 74–109)
GLUCOSE BLD-MCNC: 92 MG/DL (ref 74–109)
GLUCOSE BLD-MCNC: 94 MG/DL (ref 74–109)
GLUCOSE URINE: NEGATIVE MG/DL
HBA1C MFR BLD: 5.2 % (ref 4–6)
HCT VFR BLD CALC: 32.7 % (ref 37–47)
HCT VFR BLD CALC: 32.9 % (ref 37–47)
HCT VFR BLD CALC: 33.7 % (ref 37–47)
HCT VFR BLD CALC: 33.8 % (ref 37–47)
HCT VFR BLD CALC: 34.1 % (ref 37–47)
HCT VFR BLD CALC: 34.4 % (ref 37–47)
HCT VFR BLD CALC: 34.4 % (ref 37–47)
HCT VFR BLD CALC: 35.1 % (ref 37–47)
HCT VFR BLD CALC: 35.3 % (ref 37–47)
HCT VFR BLD CALC: 35.5 % (ref 37–47)
HCT VFR BLD CALC: 35.6 % (ref 37–47)
HCT VFR BLD CALC: 35.9 % (ref 37–47)
HCT VFR BLD CALC: 36.1 % (ref 37–47)
HCT VFR BLD CALC: 36.1 % (ref 37–47)
HCT VFR BLD CALC: 36.3 % (ref 37–47)
HCT VFR BLD CALC: 36.8 % (ref 37–47)
HCT VFR BLD CALC: 37.1 % (ref 37–47)
HCT VFR BLD CALC: 37.2 % (ref 37–47)
HCT VFR BLD CALC: 38.9 % (ref 37–47)
HCT VFR BLD CALC: 39 % (ref 37–47)
HCT VFR BLD CALC: 39.1 % (ref 37–47)
HCT VFR BLD CALC: 39.7 % (ref 37–47)
HCT VFR BLD CALC: 40 % (ref 37–47)
HDLC SERPL-MCNC: 78 MG/DL (ref 65–121)
HEMOGLOBIN: 10 G/DL (ref 12–16)
HEMOGLOBIN: 10.1 G/DL (ref 12–16)
HEMOGLOBIN: 10.4 G/DL (ref 12–16)
HEMOGLOBIN: 10.6 G/DL (ref 12–16)
HEMOGLOBIN: 10.6 G/DL (ref 12–16)
HEMOGLOBIN: 10.8 G/DL (ref 12–16)
HEMOGLOBIN: 10.8 G/DL (ref 12–16)
HEMOGLOBIN: 10.9 G/DL (ref 12–16)
HEMOGLOBIN: 10.9 G/DL (ref 12–16)
HEMOGLOBIN: 11 G/DL (ref 12–16)
HEMOGLOBIN: 11.4 G/DL (ref 12–16)
HEMOGLOBIN: 11.4 G/DL (ref 12–16)
HEMOGLOBIN: 11.5 G/DL (ref 12–16)
HEMOGLOBIN: 11.5 G/DL (ref 12–16)
HEMOGLOBIN: 11.6 G/DL (ref 12–16)
HEMOGLOBIN: 11.7 G/DL (ref 12–16)
HEMOGLOBIN: 11.7 G/DL (ref 12–16)
HEMOGLOBIN: 12 G/DL (ref 12–16)
HEMOGLOBIN: 12 G/DL (ref 12–16)
HEMOGLOBIN: 12.1 G/DL (ref 12–16)
HEMOGLOBIN: 12.3 G/DL (ref 12–16)
HEMOGLOBIN: 12.8 G/DL (ref 12–16)
HEMOGLOBIN: 12.9 G/DL (ref 12–16)
HYALINE CASTS: 3 /HPF (ref 0–8)
HYPOCHROMIA: ABNORMAL
HYPOCHROMIA: ABNORMAL
IMMATURE GRANULOCYTES #: 0 K/UL
IMMATURE GRANULOCYTES #: 0.1 K/UL
IMMATURE GRANULOCYTES #: 0.4 K/UL
IMMATURE GRANULOCYTES #: 0.7 K/UL
IMMATURE GRANULOCYTES #: 0.7 K/UL
IMMATURE GRANULOCYTES #: 1.1 K/UL
IMMATURE GRANULOCYTES #: 1.2 K/UL
IMMATURE GRANULOCYTES #: 1.3 K/UL
IMMATURE GRANULOCYTES #: 1.5 K/UL
IMMATURE GRANULOCYTES #: 1.6 K/UL
INR BLD: 1.15 (ref 0.88–1.18)
KETONES, URINE: NEGATIVE MG/DL
LDL CHOLESTEROL CALCULATED: 135 MG/DL
LEUKOCYTE ESTERASE, URINE: ABNORMAL
LEUKOCYTE ESTERASE, URINE: NEGATIVE
LEUKOCYTE ESTERASE, URINE: NEGATIVE
LV EF: 41 %
LV EF: 50 %
LVEF MODALITY: NORMAL
LVEF MODALITY: NORMAL
LYMPHOCYTES ABSOLUTE: 0.5 K/UL (ref 1.1–4.5)
LYMPHOCYTES ABSOLUTE: 0.6 K/UL (ref 1.1–4.5)
LYMPHOCYTES ABSOLUTE: 0.7 K/UL (ref 1.1–4.5)
LYMPHOCYTES ABSOLUTE: 0.7 K/UL (ref 1.1–4.5)
LYMPHOCYTES ABSOLUTE: 0.8 K/UL (ref 1.1–4.5)
LYMPHOCYTES ABSOLUTE: 1.1 K/UL (ref 1.1–4.5)
LYMPHOCYTES ABSOLUTE: 1.3 K/UL (ref 1.1–4.5)
LYMPHOCYTES ABSOLUTE: 1.8 K/UL (ref 1.1–4.5)
LYMPHOCYTES ABSOLUTE: 2.4 K/UL (ref 1.1–4.5)
LYMPHOCYTES ABSOLUTE: 2.5 K/UL (ref 1.1–4.5)
LYMPHOCYTES ABSOLUTE: 2.6 K/UL (ref 1.1–4.5)
LYMPHOCYTES ABSOLUTE: 3.2 K/UL (ref 1.1–4.5)
LYMPHOCYTES ABSOLUTE: 3.3 K/UL (ref 1.1–4.5)
LYMPHOCYTES ABSOLUTE: 3.3 K/UL (ref 1.1–4.5)
LYMPHOCYTES ABSOLUTE: 3.6 K/UL (ref 1.1–4.5)
LYMPHOCYTES ABSOLUTE: 4 K/UL (ref 1.1–4.5)
LYMPHOCYTES ABSOLUTE: 4.6 K/UL (ref 1.1–4.5)
LYMPHOCYTES RELATIVE PERCENT: 12 % (ref 20–40)
LYMPHOCYTES RELATIVE PERCENT: 18.6 % (ref 20–40)
LYMPHOCYTES RELATIVE PERCENT: 19.3 % (ref 20–40)
LYMPHOCYTES RELATIVE PERCENT: 19.4 % (ref 20–40)
LYMPHOCYTES RELATIVE PERCENT: 19.6 % (ref 20–40)
LYMPHOCYTES RELATIVE PERCENT: 20.8 % (ref 20–40)
LYMPHOCYTES RELATIVE PERCENT: 21 % (ref 20–40)
LYMPHOCYTES RELATIVE PERCENT: 21.2 % (ref 20–40)
LYMPHOCYTES RELATIVE PERCENT: 24.4 % (ref 20–40)
LYMPHOCYTES RELATIVE PERCENT: 28 % (ref 20–40)
LYMPHOCYTES RELATIVE PERCENT: 31.2 % (ref 20–40)
LYMPHOCYTES RELATIVE PERCENT: 33.3 % (ref 20–40)
LYMPHOCYTES RELATIVE PERCENT: 35 % (ref 20–40)
LYMPHOCYTES RELATIVE PERCENT: 5.4 % (ref 20–40)
LYMPHOCYTES RELATIVE PERCENT: 7.1 % (ref 20–40)
LYMPHOCYTES RELATIVE PERCENT: 8.1 % (ref 20–40)
LYMPHOCYTES RELATIVE PERCENT: 8.3 % (ref 20–40)
MACROCYTES: ABNORMAL
MAGNESIUM: 1.6 MG/DL (ref 1.6–2.4)
MAGNESIUM: 1.6 MG/DL (ref 1.6–2.4)
MAGNESIUM: 1.7 MG/DL (ref 1.6–2.4)
MAGNESIUM: 1.7 MG/DL (ref 1.6–2.4)
MAGNESIUM: 1.8 MG/DL (ref 1.6–2.4)
MAGNESIUM: 1.9 MG/DL (ref 1.6–2.4)
MAGNESIUM: 1.9 MG/DL (ref 1.6–2.4)
MAGNESIUM: 2 MG/DL (ref 1.6–2.4)
MCH RBC QN AUTO: 31 PG (ref 27–31)
MCH RBC QN AUTO: 31.5 PG (ref 27–31)
MCH RBC QN AUTO: 31.5 PG (ref 27–31)
MCH RBC QN AUTO: 31.6 PG (ref 27–31)
MCH RBC QN AUTO: 31.6 PG (ref 27–31)
MCH RBC QN AUTO: 31.7 PG (ref 27–31)
MCH RBC QN AUTO: 31.7 PG (ref 27–31)
MCH RBC QN AUTO: 31.9 PG (ref 27–31)
MCH RBC QN AUTO: 32 PG (ref 27–31)
MCH RBC QN AUTO: 32.1 PG (ref 27–31)
MCH RBC QN AUTO: 32.3 PG (ref 27–31)
MCH RBC QN AUTO: 32.3 PG (ref 27–31)
MCH RBC QN AUTO: 32.4 PG (ref 27–31)
MCH RBC QN AUTO: 32.4 PG (ref 27–31)
MCH RBC QN AUTO: 32.5 PG (ref 27–31)
MCH RBC QN AUTO: 32.5 PG (ref 27–31)
MCH RBC QN AUTO: 32.7 PG (ref 27–31)
MCH RBC QN AUTO: 33 PG (ref 27–31)
MCH RBC QN AUTO: 33.4 PG (ref 27–31)
MCHC RBC AUTO-ENTMCNC: 28.8 G/DL (ref 33–37)
MCHC RBC AUTO-ENTMCNC: 30.2 G/DL (ref 33–37)
MCHC RBC AUTO-ENTMCNC: 30.6 G/DL (ref 33–37)
MCHC RBC AUTO-ENTMCNC: 30.6 G/DL (ref 33–37)
MCHC RBC AUTO-ENTMCNC: 30.7 G/DL (ref 33–37)
MCHC RBC AUTO-ENTMCNC: 30.9 G/DL (ref 33–37)
MCHC RBC AUTO-ENTMCNC: 31 G/DL (ref 33–37)
MCHC RBC AUTO-ENTMCNC: 31.1 G/DL (ref 33–37)
MCHC RBC AUTO-ENTMCNC: 31.1 G/DL (ref 33–37)
MCHC RBC AUTO-ENTMCNC: 31.3 G/DL (ref 33–37)
MCHC RBC AUTO-ENTMCNC: 31.4 G/DL (ref 33–37)
MCHC RBC AUTO-ENTMCNC: 31.4 G/DL (ref 33–37)
MCHC RBC AUTO-ENTMCNC: 31.5 G/DL (ref 33–37)
MCHC RBC AUTO-ENTMCNC: 31.5 G/DL (ref 33–37)
MCHC RBC AUTO-ENTMCNC: 32 G/DL (ref 33–37)
MCHC RBC AUTO-ENTMCNC: 32 G/DL (ref 33–37)
MCHC RBC AUTO-ENTMCNC: 32.2 G/DL (ref 33–37)
MCHC RBC AUTO-ENTMCNC: 32.3 G/DL (ref 33–37)
MCHC RBC AUTO-ENTMCNC: 32.3 G/DL (ref 33–37)
MCHC RBC AUTO-ENTMCNC: 32.4 G/DL (ref 33–37)
MCHC RBC AUTO-ENTMCNC: 32.4 G/DL (ref 33–37)
MCHC RBC AUTO-ENTMCNC: 32.6 G/DL (ref 33–37)
MCHC RBC AUTO-ENTMCNC: 33.2 G/DL (ref 33–37)
MCV RBC AUTO: 100 FL (ref 81–99)
MCV RBC AUTO: 100 FL (ref 81–99)
MCV RBC AUTO: 100.3 FL (ref 81–99)
MCV RBC AUTO: 100.8 FL (ref 81–99)
MCV RBC AUTO: 100.8 FL (ref 81–99)
MCV RBC AUTO: 101 FL (ref 81–99)
MCV RBC AUTO: 101.2 FL (ref 81–99)
MCV RBC AUTO: 101.4 FL (ref 81–99)
MCV RBC AUTO: 101.5 FL (ref 81–99)
MCV RBC AUTO: 101.7 FL (ref 81–99)
MCV RBC AUTO: 101.8 FL (ref 81–99)
MCV RBC AUTO: 102 FL (ref 81–99)
MCV RBC AUTO: 102.2 FL (ref 81–99)
MCV RBC AUTO: 102.3 FL (ref 81–99)
MCV RBC AUTO: 102.4 FL (ref 81–99)
MCV RBC AUTO: 103.1 FL (ref 81–99)
MCV RBC AUTO: 103.4 FL (ref 81–99)
MCV RBC AUTO: 104.1 FL (ref 81–99)
MCV RBC AUTO: 104.2 FL (ref 81–99)
MCV RBC AUTO: 111.4 FL (ref 81–99)
MCV RBC AUTO: 97.9 FL (ref 81–99)
MONOCYTES ABSOLUTE: 0.1 K/UL (ref 0–0.9)
MONOCYTES ABSOLUTE: 0.1 K/UL (ref 0–0.9)
MONOCYTES ABSOLUTE: 0.2 K/UL (ref 0–0.9)
MONOCYTES ABSOLUTE: 0.3 K/UL (ref 0–0.9)
MONOCYTES ABSOLUTE: 0.5 K/UL (ref 0–0.9)
MONOCYTES ABSOLUTE: 0.5 K/UL (ref 0–0.9)
MONOCYTES ABSOLUTE: 0.6 K/UL (ref 0–0.9)
MONOCYTES ABSOLUTE: 0.7 K/UL (ref 0–0.9)
MONOCYTES ABSOLUTE: 0.7 K/UL (ref 0–0.9)
MONOCYTES ABSOLUTE: 0.8 K/UL (ref 0–0.9)
MONOCYTES ABSOLUTE: 0.9 K/UL (ref 0–0.9)
MONOCYTES ABSOLUTE: 0.9 K/UL (ref 0–0.9)
MONOCYTES RELATIVE PERCENT: 1 % (ref 0–10)
MONOCYTES RELATIVE PERCENT: 1 % (ref 0–10)
MONOCYTES RELATIVE PERCENT: 2.6 % (ref 0–10)
MONOCYTES RELATIVE PERCENT: 2.9 % (ref 0–10)
MONOCYTES RELATIVE PERCENT: 3.5 % (ref 0–10)
MONOCYTES RELATIVE PERCENT: 4 % (ref 0–10)
MONOCYTES RELATIVE PERCENT: 4.4 % (ref 0–10)
MONOCYTES RELATIVE PERCENT: 4.5 % (ref 0–10)
MONOCYTES RELATIVE PERCENT: 4.8 % (ref 0–10)
MONOCYTES RELATIVE PERCENT: 4.8 % (ref 0–10)
MONOCYTES RELATIVE PERCENT: 5.1 % (ref 0–10)
MONOCYTES RELATIVE PERCENT: 5.3 % (ref 0–10)
MONOCYTES RELATIVE PERCENT: 5.5 % (ref 0–10)
MONOCYTES RELATIVE PERCENT: 5.7 % (ref 0–10)
MONOCYTES RELATIVE PERCENT: 6.7 % (ref 0–10)
MONOCYTES RELATIVE PERCENT: 7 % (ref 0–10)
MONOCYTES RELATIVE PERCENT: 9.3 % (ref 0–10)
MRSA CULTURE ONLY: NORMAL
MYELOCYTE PERCENT: 1 %
MYELOCYTE PERCENT: 2 %
NEUTROPHILS ABSOLUTE: 10.9 K/UL (ref 1.5–7.5)
NEUTROPHILS ABSOLUTE: 11.2 K/UL (ref 1.5–7.5)
NEUTROPHILS ABSOLUTE: 11.3 K/UL (ref 1.5–7.5)
NEUTROPHILS ABSOLUTE: 3 K/UL (ref 1.5–7.5)
NEUTROPHILS ABSOLUTE: 3.7 K/UL (ref 1.5–7.5)
NEUTROPHILS ABSOLUTE: 3.9 K/UL (ref 1.5–7.5)
NEUTROPHILS ABSOLUTE: 4.8 K/UL (ref 1.5–7.5)
NEUTROPHILS ABSOLUTE: 5.1 K/UL (ref 1.5–7.5)
NEUTROPHILS ABSOLUTE: 5.7 K/UL (ref 1.5–7.5)
NEUTROPHILS ABSOLUTE: 6.6 K/UL (ref 1.5–7.5)
NEUTROPHILS ABSOLUTE: 6.9 K/UL (ref 1.5–7.5)
NEUTROPHILS ABSOLUTE: 7.7 K/UL (ref 1.5–7.5)
NEUTROPHILS ABSOLUTE: 7.8 K/UL (ref 1.5–7.5)
NEUTROPHILS ABSOLUTE: 8 K/UL (ref 1.5–7.5)
NEUTROPHILS ABSOLUTE: 8.6 K/UL (ref 1.5–7.5)
NEUTROPHILS ABSOLUTE: 8.9 K/UL (ref 1.5–7.5)
NEUTROPHILS ABSOLUTE: 9.5 K/UL (ref 1.5–7.5)
NEUTROPHILS RELATIVE PERCENT: 44 % (ref 50–65)
NEUTROPHILS RELATIVE PERCENT: 52.2 % (ref 50–65)
NEUTROPHILS RELATIVE PERCENT: 56.2 % (ref 50–65)
NEUTROPHILS RELATIVE PERCENT: 60.8 % (ref 50–65)
NEUTROPHILS RELATIVE PERCENT: 65 % (ref 50–65)
NEUTROPHILS RELATIVE PERCENT: 66 % (ref 50–65)
NEUTROPHILS RELATIVE PERCENT: 66 % (ref 50–65)
NEUTROPHILS RELATIVE PERCENT: 67.5 % (ref 50–65)
NEUTROPHILS RELATIVE PERCENT: 71.1 % (ref 50–65)
NEUTROPHILS RELATIVE PERCENT: 72 % (ref 50–65)
NEUTROPHILS RELATIVE PERCENT: 72.1 % (ref 50–65)
NEUTROPHILS RELATIVE PERCENT: 73.3 % (ref 50–65)
NEUTROPHILS RELATIVE PERCENT: 84 % (ref 50–65)
NEUTROPHILS RELATIVE PERCENT: 85.4 % (ref 50–65)
NEUTROPHILS RELATIVE PERCENT: 86.4 % (ref 50–65)
NEUTROPHILS RELATIVE PERCENT: 87.3 % (ref 50–65)
NEUTROPHILS RELATIVE PERCENT: 88.9 % (ref 50–65)
NITRITE, URINE: NEGATIVE
NITRITE, URINE: NEGATIVE
NITRITE, URINE: POSITIVE
NUCLEATED RED BLOOD CELLS: 1 /100 WBC
ORGANISM: ABNORMAL
OVALOCYTES: ABNORMAL
PDW BLD-RTO: 13 % (ref 11.5–14.5)
PDW BLD-RTO: 13.2 % (ref 11.5–14.5)
PDW BLD-RTO: 13.3 % (ref 11.5–14.5)
PDW BLD-RTO: 13.3 % (ref 11.5–14.5)
PDW BLD-RTO: 13.4 % (ref 11.5–14.5)
PDW BLD-RTO: 13.5 % (ref 11.5–14.5)
PDW BLD-RTO: 13.5 % (ref 11.5–14.5)
PDW BLD-RTO: 13.7 % (ref 11.5–14.5)
PDW BLD-RTO: 13.7 % (ref 11.5–14.5)
PDW BLD-RTO: 14 % (ref 11.5–14.5)
PDW BLD-RTO: 14.1 % (ref 11.5–14.5)
PDW BLD-RTO: 14.3 % (ref 11.5–14.5)
PDW BLD-RTO: 14.5 % (ref 11.5–14.5)
PDW BLD-RTO: 14.7 % (ref 11.5–14.5)
PDW BLD-RTO: 14.8 % (ref 11.5–14.5)
PDW BLD-RTO: 14.8 % (ref 11.5–14.5)
PERFORMED ON: ABNORMAL
PH UA: 6 (ref 5–8)
PH UA: 7 (ref 5–8)
PH UA: 8 (ref 5–8)
PLATELET # BLD: 148 K/UL (ref 130–400)
PLATELET # BLD: 150 K/UL (ref 130–400)
PLATELET # BLD: 154 K/UL (ref 130–400)
PLATELET # BLD: 171 K/UL (ref 130–400)
PLATELET # BLD: 171 K/UL (ref 130–400)
PLATELET # BLD: 173 K/UL (ref 130–400)
PLATELET # BLD: 188 K/UL (ref 130–400)
PLATELET # BLD: 189 K/UL (ref 130–400)
PLATELET # BLD: 213 K/UL (ref 130–400)
PLATELET # BLD: 235 K/UL (ref 130–400)
PLATELET # BLD: 247 K/UL (ref 130–400)
PLATELET # BLD: 252 K/UL (ref 130–400)
PLATELET # BLD: 264 K/UL (ref 130–400)
PLATELET # BLD: 271 K/UL (ref 130–400)
PLATELET # BLD: 279 K/UL (ref 130–400)
PLATELET # BLD: 284 K/UL (ref 130–400)
PLATELET # BLD: 289 K/UL (ref 130–400)
PLATELET # BLD: 300 K/UL (ref 130–400)
PLATELET # BLD: 301 K/UL (ref 130–400)
PLATELET # BLD: 305 K/UL (ref 130–400)
PLATELET # BLD: 314 K/UL (ref 130–400)
PLATELET # BLD: 315 K/UL (ref 130–400)
PLATELET # BLD: 321 K/UL (ref 130–400)
PLATELET SLIDE REVIEW: ADEQUATE
PMV BLD AUTO: 10.2 FL (ref 9.4–12.3)
PMV BLD AUTO: 10.3 FL (ref 9.4–12.3)
PMV BLD AUTO: 10.3 FL (ref 9.4–12.3)
PMV BLD AUTO: 10.4 FL (ref 9.4–12.3)
PMV BLD AUTO: 10.5 FL (ref 9.4–12.3)
PMV BLD AUTO: 10.7 FL (ref 9.4–12.3)
PMV BLD AUTO: 10.7 FL (ref 9.4–12.3)
PMV BLD AUTO: 10.8 FL (ref 9.4–12.3)
PMV BLD AUTO: 11.1 FL (ref 9.4–12.3)
PMV BLD AUTO: 11.1 FL (ref 9.4–12.3)
PMV BLD AUTO: 11.4 FL (ref 9.4–12.3)
PMV BLD AUTO: 11.6 FL (ref 9.4–12.3)
PMV BLD AUTO: 9.1 FL (ref 9.4–12.3)
PMV BLD AUTO: 9.4 FL (ref 9.4–12.3)
PMV BLD AUTO: 9.4 FL (ref 9.4–12.3)
PMV BLD AUTO: 9.6 FL (ref 9.4–12.3)
PMV BLD AUTO: 9.7 FL (ref 9.4–12.3)
PMV BLD AUTO: 9.7 FL (ref 9.4–12.3)
PMV BLD AUTO: 9.8 FL (ref 9.4–12.3)
PMV BLD AUTO: 9.8 FL (ref 9.4–12.3)
POLYCHROMASIA: ABNORMAL
POLYCHROMASIA: ABNORMAL
POTASSIUM REFLEX MAGNESIUM: 3.9 MMOL/L (ref 3.5–5)
POTASSIUM REFLEX MAGNESIUM: 4.2 MMOL/L (ref 3.5–5)
POTASSIUM REFLEX MAGNESIUM: 4.2 MMOL/L (ref 3.5–5)
POTASSIUM REFLEX MAGNESIUM: 4.5 MMOL/L (ref 3.5–5)
POTASSIUM REFLEX MAGNESIUM: 4.5 MMOL/L (ref 3.5–5)
POTASSIUM REFLEX MAGNESIUM: 4.6 MMOL/L (ref 3.5–5)
POTASSIUM REFLEX MAGNESIUM: 4.7 MMOL/L (ref 3.5–5)
POTASSIUM REFLEX MAGNESIUM: 4.7 MMOL/L (ref 3.5–5)
POTASSIUM REFLEX MAGNESIUM: 4.8 MMOL/L (ref 3.5–5)
POTASSIUM REFLEX MAGNESIUM: 4.9 MMOL/L (ref 3.5–5)
POTASSIUM REFLEX MAGNESIUM: 5 MMOL/L (ref 3.5–5)
POTASSIUM REFLEX MAGNESIUM: 5.1 MMOL/L (ref 3.5–5)
POTASSIUM REFLEX MAGNESIUM: 5.1 MMOL/L (ref 3.5–5)
POTASSIUM SERPL-SCNC: 3.7 MMOL/L (ref 3.5–5)
POTASSIUM SERPL-SCNC: 3.9 MMOL/L (ref 3.5–5)
POTASSIUM SERPL-SCNC: 4.3 MMOL/L (ref 3.5–5)
POTASSIUM SERPL-SCNC: 4.4 MMOL/L (ref 3.5–5)
POTASSIUM SERPL-SCNC: 4.6 MMOL/L (ref 3.5–5)
POTASSIUM SERPL-SCNC: 4.6 MMOL/L (ref 3.5–5)
POTASSIUM SERPL-SCNC: 4.9 MMOL/L (ref 3.5–5)
POTASSIUM SERPL-SCNC: 5.2 MMOL/L (ref 3.5–5)
PRO-BNP: ABNORMAL PG/ML (ref 0–1800)
PROTEIN UA: ABNORMAL MG/DL
PROTEIN UA: NEGATIVE MG/DL
PROTEIN UA: NEGATIVE MG/DL
PROTHROMBIN TIME: 14.1 SEC (ref 12–14.6)
RBC # BLD: 3.19 M/UL (ref 4.2–5.4)
RBC # BLD: 3.23 M/UL (ref 4.2–5.4)
RBC # BLD: 3.26 M/UL (ref 4.2–5.4)
RBC # BLD: 3.3 M/UL (ref 4.2–5.4)
RBC # BLD: 3.32 M/UL (ref 4.2–5.4)
RBC # BLD: 3.35 M/UL (ref 4.2–5.4)
RBC # BLD: 3.38 M/UL (ref 4.2–5.4)
RBC # BLD: 3.42 M/UL (ref 4.2–5.4)
RBC # BLD: 3.46 M/UL (ref 4.2–5.4)
RBC # BLD: 3.48 M/UL (ref 4.2–5.4)
RBC # BLD: 3.52 M/UL (ref 4.2–5.4)
RBC # BLD: 3.55 M/UL (ref 4.2–5.4)
RBC # BLD: 3.55 M/UL (ref 4.2–5.4)
RBC # BLD: 3.56 M/UL (ref 4.2–5.4)
RBC # BLD: 3.59 M/UL (ref 4.2–5.4)
RBC # BLD: 3.61 M/UL (ref 4.2–5.4)
RBC # BLD: 3.63 M/UL (ref 4.2–5.4)
RBC # BLD: 3.72 M/UL (ref 4.2–5.4)
RBC # BLD: 3.76 M/UL (ref 4.2–5.4)
RBC # BLD: 3.82 M/UL (ref 4.2–5.4)
RBC # BLD: 3.86 M/UL (ref 4.2–5.4)
RBC # BLD: 3.86 M/UL (ref 4.2–5.4)
RBC # BLD: 3.91 M/UL (ref 4.2–5.4)
RBC UA: 6 /HPF (ref 0–4)
REASON FOR REJECTION: NORMAL
REJECTED TEST: NORMAL
SODIUM BLD-SCNC: 133 MMOL/L (ref 136–145)
SODIUM BLD-SCNC: 134 MMOL/L (ref 136–145)
SODIUM BLD-SCNC: 136 MMOL/L (ref 136–145)
SODIUM BLD-SCNC: 137 MMOL/L (ref 136–145)
SODIUM BLD-SCNC: 138 MMOL/L (ref 136–145)
SODIUM BLD-SCNC: 138 MMOL/L (ref 136–145)
SODIUM BLD-SCNC: 139 MMOL/L (ref 136–145)
SODIUM BLD-SCNC: 139 MMOL/L (ref 136–145)
SODIUM BLD-SCNC: 140 MMOL/L (ref 136–145)
SODIUM BLD-SCNC: 141 MMOL/L (ref 136–145)
SODIUM BLD-SCNC: 141 MMOL/L (ref 136–145)
SODIUM BLD-SCNC: 144 MMOL/L (ref 136–145)
SPECIFIC GRAVITY UA: 1.01 (ref 1–1.03)
SPECIFIC GRAVITY UA: 1.01 (ref 1–1.03)
SPECIFIC GRAVITY UA: 1.03 (ref 1–1.03)
TARGET CELLS: ABNORMAL
TEAR DROP CELLS: ABNORMAL
TOTAL PROTEIN: 5 G/DL (ref 6.6–8.7)
TOTAL PROTEIN: 5.6 G/DL (ref 6.6–8.7)
TOTAL PROTEIN: 5.6 G/DL (ref 6.6–8.7)
TOTAL PROTEIN: 6 G/DL (ref 6.6–8.7)
TRIGL SERPL-MCNC: 78 MG/DL (ref 0–149)
TROPONIN: 0.01 NG/ML (ref 0–0.03)
TROPONIN: 0.01 NG/ML (ref 0–0.03)
TROPONIN: 0.02 NG/ML (ref 0–0.03)
TROPONIN: 0.03 NG/ML (ref 0–0.03)
TROPONIN: 0.04 NG/ML (ref 0–0.03)
TROPONIN: 0.05 NG/ML (ref 0–0.03)
TROPONIN: 0.06 NG/ML (ref 0–0.03)
TROPONIN: 0.09 NG/ML (ref 0–0.03)
TROPONIN: 0.12 NG/ML (ref 0–0.03)
TSH REFLEX FT4: 0.9 UIU/ML (ref 0.35–5.5)
URINE CULTURE, ROUTINE: ABNORMAL
URINE CULTURE, ROUTINE: NORMAL
URINE REFLEX TO CULTURE: YES
UROBILINOGEN, URINE: 0.2 E.U./DL
VACUOLATED NEUTROPHILS: ABNORMAL
WBC # BLD: 10.1 K/UL (ref 4.8–10.8)
WBC # BLD: 10.2 K/UL (ref 4.8–10.8)
WBC # BLD: 10.4 K/UL (ref 4.8–10.8)
WBC # BLD: 10.9 K/UL (ref 4.8–10.8)
WBC # BLD: 11.1 K/UL (ref 4.8–10.8)
WBC # BLD: 12.1 K/UL (ref 4.8–10.8)
WBC # BLD: 12.7 K/UL (ref 4.8–10.8)
WBC # BLD: 13.4 K/UL (ref 4.8–10.8)
WBC # BLD: 14.6 K/UL (ref 4.8–10.8)
WBC # BLD: 16.5 K/UL (ref 4.8–10.8)
WBC # BLD: 16.8 K/UL (ref 4.8–10.8)
WBC # BLD: 16.9 K/UL (ref 4.8–10.8)
WBC # BLD: 17 K/UL (ref 4.8–10.8)
WBC # BLD: 21.6 K/UL (ref 4.8–10.8)
WBC # BLD: 5.1 K/UL (ref 4.8–10.8)
WBC # BLD: 5.4 K/UL (ref 4.8–10.8)
WBC # BLD: 5.4 K/UL (ref 4.8–10.8)
WBC # BLD: 5.9 K/UL (ref 4.8–10.8)
WBC # BLD: 8 K/UL (ref 4.8–10.8)
WBC # BLD: 8.7 K/UL (ref 4.8–10.8)
WBC # BLD: 8.7 K/UL (ref 4.8–10.8)
WBC # BLD: 8.8 K/UL (ref 4.8–10.8)
WBC # BLD: 9.8 K/UL (ref 4.8–10.8)
WBC UA: 818 /HPF (ref 0–5)

## 2019-01-01 PROCEDURE — 99152 MOD SED SAME PHYS/QHP 5/>YRS: CPT

## 2019-01-01 PROCEDURE — 6360000002 HC RX W HCPCS: Performed by: HOSPITALIST

## 2019-01-01 PROCEDURE — 6370000000 HC RX 637 (ALT 250 FOR IP): Performed by: INTERNAL MEDICINE

## 2019-01-01 PROCEDURE — 6370000000 HC RX 637 (ALT 250 FOR IP): Performed by: HOSPITALIST

## 2019-01-01 PROCEDURE — 6370000000 HC RX 637 (ALT 250 FOR IP): Performed by: FAMILY MEDICINE

## 2019-01-01 PROCEDURE — 2580000003 HC RX 258: Performed by: HOSPITALIST

## 2019-01-01 PROCEDURE — 81003 URINALYSIS AUTO W/O SCOPE: CPT

## 2019-01-01 PROCEDURE — 36415 COLL VENOUS BLD VENIPUNCTURE: CPT

## 2019-01-01 PROCEDURE — 80048 BASIC METABOLIC PNL TOTAL CA: CPT

## 2019-01-01 PROCEDURE — 2700000000 HC OXYGEN THERAPY PER DAY

## 2019-01-01 PROCEDURE — 94640 AIRWAY INHALATION TREATMENT: CPT

## 2019-01-01 PROCEDURE — 85027 COMPLETE CBC AUTOMATED: CPT

## 2019-01-01 PROCEDURE — G0378 HOSPITAL OBSERVATION PER HR: HCPCS

## 2019-01-01 PROCEDURE — C1887 CATHETER, GUIDING: HCPCS

## 2019-01-01 PROCEDURE — 6360000002 HC RX W HCPCS: Performed by: INTERNAL MEDICINE

## 2019-01-01 PROCEDURE — 83735 ASSAY OF MAGNESIUM: CPT

## 2019-01-01 PROCEDURE — 99232 SBSQ HOSP IP/OBS MODERATE 35: CPT | Performed by: INTERNAL MEDICINE

## 2019-01-01 PROCEDURE — C1725 CATH, TRANSLUMIN NON-LASER: HCPCS

## 2019-01-01 PROCEDURE — 85025 COMPLETE CBC W/AUTO DIFF WBC: CPT

## 2019-01-01 PROCEDURE — 96366 THER/PROPH/DIAG IV INF ADDON: CPT

## 2019-01-01 PROCEDURE — 2140000000 HC CCU INTERMEDIATE R&B

## 2019-01-01 PROCEDURE — 96376 TX/PRO/DX INJ SAME DRUG ADON: CPT

## 2019-01-01 PROCEDURE — 97530 THERAPEUTIC ACTIVITIES: CPT

## 2019-01-01 PROCEDURE — 71045 X-RAY EXAM CHEST 1 VIEW: CPT

## 2019-01-01 PROCEDURE — 87086 URINE CULTURE/COLONY COUNT: CPT

## 2019-01-01 PROCEDURE — 99223 1ST HOSP IP/OBS HIGH 75: CPT | Performed by: INTERNAL MEDICINE

## 2019-01-01 PROCEDURE — 84484 ASSAY OF TROPONIN QUANT: CPT

## 2019-01-01 PROCEDURE — 74018 RADEX ABDOMEN 1 VIEW: CPT

## 2019-01-01 PROCEDURE — 93010 ELECTROCARDIOGRAM REPORT: CPT | Performed by: INTERNAL MEDICINE

## 2019-01-01 PROCEDURE — 85379 FIBRIN DEGRADATION QUANT: CPT

## 2019-01-01 PROCEDURE — 99153 MOD SED SAME PHYS/QHP EA: CPT

## 2019-01-01 PROCEDURE — 87081 CULTURE SCREEN ONLY: CPT

## 2019-01-01 PROCEDURE — 82948 REAGENT STRIP/BLOOD GLUCOSE: CPT

## 2019-01-01 PROCEDURE — 78452 HT MUSCLE IMAGE SPECT MULT: CPT

## 2019-01-01 PROCEDURE — 2500000003 HC RX 250 WO HCPCS: Performed by: HOSPITALIST

## 2019-01-01 PROCEDURE — 80061 LIPID PANEL: CPT

## 2019-01-01 PROCEDURE — 97535 SELF CARE MNGMENT TRAINING: CPT

## 2019-01-01 PROCEDURE — 93005 ELECTROCARDIOGRAM TRACING: CPT | Performed by: INTERNAL MEDICINE

## 2019-01-01 PROCEDURE — C1769 GUIDE WIRE: HCPCS

## 2019-01-01 PROCEDURE — 96368 THER/DIAG CONCURRENT INF: CPT

## 2019-01-01 PROCEDURE — 99221 1ST HOSP IP/OBS SF/LOW 40: CPT | Performed by: PSYCHIATRY & NEUROLOGY

## 2019-01-01 PROCEDURE — B2151ZZ FLUOROSCOPY OF LEFT HEART USING LOW OSMOLAR CONTRAST: ICD-10-PCS | Performed by: INTERNAL MEDICINE

## 2019-01-01 PROCEDURE — 6360000004 HC RX CONTRAST MEDICATION: Performed by: HOSPITALIST

## 2019-01-01 PROCEDURE — 97116 GAIT TRAINING THERAPY: CPT

## 2019-01-01 PROCEDURE — 92928 PRQ TCAT PLMT NTRAC ST 1 LES: CPT

## 2019-01-01 PROCEDURE — 81001 URINALYSIS AUTO W/SCOPE: CPT

## 2019-01-01 PROCEDURE — 84443 ASSAY THYROID STIM HORMONE: CPT

## 2019-01-01 PROCEDURE — 85730 THROMBOPLASTIN TIME PARTIAL: CPT

## 2019-01-01 PROCEDURE — 71275 CT ANGIOGRAPHY CHEST: CPT

## 2019-01-01 PROCEDURE — 92928 PRQ TCAT PLMT NTRAC ST 1 LES: CPT | Performed by: INTERNAL MEDICINE

## 2019-01-01 PROCEDURE — 80053 COMPREHEN METABOLIC PANEL: CPT

## 2019-01-01 PROCEDURE — 2100000000 HC CCU R&B

## 2019-01-01 PROCEDURE — A9500 TC99M SESTAMIBI: HCPCS | Performed by: INTERNAL MEDICINE

## 2019-01-01 PROCEDURE — 6360000002 HC RX W HCPCS

## 2019-01-01 PROCEDURE — 83880 ASSAY OF NATRIURETIC PEPTIDE: CPT

## 2019-01-01 PROCEDURE — 2580000003 HC RX 258: Performed by: INTERNAL MEDICINE

## 2019-01-01 PROCEDURE — 87040 BLOOD CULTURE FOR BACTERIA: CPT

## 2019-01-01 PROCEDURE — 99152 MOD SED SAME PHYS/QHP 5/>YRS: CPT | Performed by: INTERNAL MEDICINE

## 2019-01-01 PROCEDURE — C1894 INTRO/SHEATH, NON-LASER: HCPCS

## 2019-01-01 PROCEDURE — 97110 THERAPEUTIC EXERCISES: CPT

## 2019-01-01 PROCEDURE — 94669 MECHANICAL CHEST WALL OSCILL: CPT

## 2019-01-01 PROCEDURE — 87186 SC STD MICRODIL/AGAR DIL: CPT

## 2019-01-01 PROCEDURE — 3430000000 HC RX DIAGNOSTIC RADIOPHARMACEUTICAL: Performed by: INTERNAL MEDICINE

## 2019-01-01 PROCEDURE — 93458 L HRT ARTERY/VENTRICLE ANGIO: CPT | Performed by: INTERNAL MEDICINE

## 2019-01-01 PROCEDURE — 99221 1ST HOSP IP/OBS SF/LOW 40: CPT | Performed by: INTERNAL MEDICINE

## 2019-01-01 PROCEDURE — 97162 PT EVAL MOD COMPLEX 30 MIN: CPT

## 2019-01-01 PROCEDURE — 4A023N7 MEASUREMENT OF CARDIAC SAMPLING AND PRESSURE, LEFT HEART, PERCUTANEOUS APPROACH: ICD-10-PCS | Performed by: INTERNAL MEDICINE

## 2019-01-01 PROCEDURE — 87077 CULTURE AEROBIC IDENTIFY: CPT

## 2019-01-01 PROCEDURE — 97161 PT EVAL LOW COMPLEX 20 MIN: CPT

## 2019-01-01 PROCEDURE — 6370000000 HC RX 637 (ALT 250 FOR IP)

## 2019-01-01 PROCEDURE — 93017 CV STRESS TEST TRACING ONLY: CPT

## 2019-01-01 PROCEDURE — 97165 OT EVAL LOW COMPLEX 30 MIN: CPT

## 2019-01-01 PROCEDURE — C1874 STENT, COATED/COV W/DEL SYS: HCPCS

## 2019-01-01 PROCEDURE — 2709999900 HC NON-CHARGEABLE SUPPLY

## 2019-01-01 PROCEDURE — 94761 N-INVAS EAR/PLS OXIMETRY MLT: CPT

## 2019-01-01 PROCEDURE — 99024 POSTOP FOLLOW-UP VISIT: CPT | Performed by: INTERNAL MEDICINE

## 2019-01-01 PROCEDURE — 96365 THER/PROPH/DIAG IV INF INIT: CPT

## 2019-01-01 PROCEDURE — B2111ZZ FLUOROSCOPY OF MULTIPLE CORONARY ARTERIES USING LOW OSMOLAR CONTRAST: ICD-10-PCS | Performed by: INTERNAL MEDICINE

## 2019-01-01 PROCEDURE — 2580000003 HC RX 258: Performed by: FAMILY MEDICINE

## 2019-01-01 PROCEDURE — 93458 L HRT ARTERY/VENTRICLE ANGIO: CPT

## 2019-01-01 PROCEDURE — 93306 TTE W/DOPPLER COMPLETE: CPT

## 2019-01-01 PROCEDURE — 85610 PROTHROMBIN TIME: CPT

## 2019-01-01 PROCEDURE — 99231 SBSQ HOSP IP/OBS SF/LOW 25: CPT | Performed by: INTERNAL MEDICINE

## 2019-01-01 PROCEDURE — 76770 US EXAM ABDO BACK WALL COMP: CPT

## 2019-01-01 PROCEDURE — 027135Z DILATION OF CORONARY ARTERY, TWO ARTERIES WITH TWO DRUG-ELUTING INTRALUMINAL DEVICES, PERCUTANEOUS APPROACH: ICD-10-PCS | Performed by: INTERNAL MEDICINE

## 2019-01-01 PROCEDURE — 83036 HEMOGLOBIN GLYCOSYLATED A1C: CPT

## 2019-01-01 PROCEDURE — 93005 ELECTROCARDIOGRAM TRACING: CPT | Performed by: HOSPITALIST

## 2019-01-01 RX ORDER — ARIPIPRAZOLE 20 MG/1
20 TABLET ORAL DAILY
Status: ON HOLD | COMMUNITY
End: 2020-01-01

## 2019-01-01 RX ORDER — CARVEDILOL 3.12 MG/1
3.12 TABLET ORAL 2 TIMES DAILY WITH MEALS
Status: DISCONTINUED | OUTPATIENT
Start: 2019-01-01 | End: 2019-01-01 | Stop reason: HOSPADM

## 2019-01-01 RX ORDER — ISOSORBIDE MONONITRATE 30 MG/1
30 TABLET, EXTENDED RELEASE ORAL DAILY
Status: DISCONTINUED | OUTPATIENT
Start: 2019-01-01 | End: 2019-01-01 | Stop reason: HOSPADM

## 2019-01-01 RX ORDER — FLUOXETINE HYDROCHLORIDE 20 MG/1
20 CAPSULE ORAL DAILY
Status: DISCONTINUED | OUTPATIENT
Start: 2019-01-01 | End: 2019-01-01

## 2019-01-01 RX ORDER — 0.9 % SODIUM CHLORIDE 0.9 %
1000 INTRAVENOUS SOLUTION INTRAVENOUS ONCE
Status: COMPLETED | OUTPATIENT
Start: 2019-01-01 | End: 2019-01-01

## 2019-01-01 RX ORDER — OXYCODONE AND ACETAMINOPHEN 10; 325 MG/1; MG/1
1 TABLET ORAL EVERY 8 HOURS PRN
Status: DISCONTINUED | OUTPATIENT
Start: 2019-01-01 | End: 2019-01-01 | Stop reason: HOSPADM

## 2019-01-01 RX ORDER — PREDNISONE 20 MG/1
20 TABLET ORAL DAILY
Status: DISCONTINUED | OUTPATIENT
Start: 2019-01-01 | End: 2019-01-01 | Stop reason: HOSPADM

## 2019-01-01 RX ORDER — ASPIRIN 81 MG/1
81 TABLET, CHEWABLE ORAL DAILY
Status: DISCONTINUED | OUTPATIENT
Start: 2019-01-01 | End: 2019-01-01 | Stop reason: HOSPADM

## 2019-01-01 RX ORDER — IODIXANOL 320 MG/ML
100 INJECTION, SOLUTION INTRAVASCULAR
Status: COMPLETED | OUTPATIENT
Start: 2019-01-01 | End: 2019-01-01

## 2019-01-01 RX ORDER — SODIUM CHLORIDE 0.9 % (FLUSH) 0.9 %
10 SYRINGE (ML) INJECTION PRN
Status: DISCONTINUED | OUTPATIENT
Start: 2019-01-01 | End: 2019-01-01 | Stop reason: HOSPADM

## 2019-01-01 RX ORDER — SODIUM CHLORIDE 9 MG/ML
INJECTION, SOLUTION INTRAVENOUS CONTINUOUS
Status: ACTIVE | OUTPATIENT
Start: 2019-01-01 | End: 2019-01-01

## 2019-01-01 RX ORDER — PROMETHAZINE HYDROCHLORIDE 25 MG/ML
6.25 INJECTION, SOLUTION INTRAMUSCULAR; INTRAVENOUS ONCE
Status: COMPLETED | OUTPATIENT
Start: 2019-01-01 | End: 2019-01-01

## 2019-01-01 RX ORDER — HEPARIN SODIUM 10000 [USP'U]/100ML
12 INJECTION, SOLUTION INTRAVENOUS CONTINUOUS
Status: DISCONTINUED | OUTPATIENT
Start: 2019-01-01 | End: 2019-01-01

## 2019-01-01 RX ORDER — HYDROXYZINE HYDROCHLORIDE 25 MG/1
25 TABLET, FILM COATED ORAL 3 TIMES DAILY PRN
Status: DISCONTINUED | OUTPATIENT
Start: 2019-01-01 | End: 2019-01-01 | Stop reason: HOSPADM

## 2019-01-01 RX ORDER — ISOSORBIDE MONONITRATE 30 MG/1
30 TABLET, EXTENDED RELEASE ORAL DAILY
Qty: 30 TABLET | Refills: 3 | Status: SHIPPED | OUTPATIENT
Start: 2019-01-01

## 2019-01-01 RX ORDER — IPRATROPIUM BROMIDE AND ALBUTEROL SULFATE 2.5; .5 MG/3ML; MG/3ML
1 SOLUTION RESPIRATORY (INHALATION) EVERY 6 HOURS PRN
Status: DISCONTINUED | OUTPATIENT
Start: 2019-01-01 | End: 2019-01-01

## 2019-01-01 RX ORDER — OXYCODONE AND ACETAMINOPHEN 10; 325 MG/1; MG/1
1 TABLET ORAL EVERY 8 HOURS PRN
Status: ON HOLD | COMMUNITY
End: 2019-01-01 | Stop reason: SDUPTHER

## 2019-01-01 RX ORDER — MIRTAZAPINE 7.5 MG/1
7.5 TABLET, FILM COATED ORAL NIGHTLY
Qty: 30 TABLET | Refills: 3 | Status: SHIPPED | OUTPATIENT
Start: 2019-01-01

## 2019-01-01 RX ORDER — CLOPIDOGREL BISULFATE 75 MG/1
75 TABLET ORAL DAILY
Qty: 30 TABLET | Refills: 3 | Status: SHIPPED | OUTPATIENT
Start: 2019-01-01

## 2019-01-01 RX ORDER — NITROGLYCERIN 20 MG/100ML
5 INJECTION INTRAVENOUS CONTINUOUS
Status: DISCONTINUED | OUTPATIENT
Start: 2019-01-01 | End: 2019-01-01

## 2019-01-01 RX ORDER — ONDANSETRON 2 MG/ML
4 INJECTION INTRAMUSCULAR; INTRAVENOUS EVERY 6 HOURS PRN
Status: DISCONTINUED | OUTPATIENT
Start: 2019-01-01 | End: 2019-01-01

## 2019-01-01 RX ORDER — LACTULOSE 10 G/15ML
20 SOLUTION ORAL EVERY 6 HOURS SCHEDULED
Status: DISCONTINUED | OUTPATIENT
Start: 2019-01-01 | End: 2019-01-01 | Stop reason: HOSPADM

## 2019-01-01 RX ORDER — SODIUM CHLORIDE 0.9 % (FLUSH) 0.9 %
10 SYRINGE (ML) INJECTION EVERY 12 HOURS SCHEDULED
Status: DISCONTINUED | OUTPATIENT
Start: 2019-01-01 | End: 2019-01-01

## 2019-01-01 RX ORDER — IPRATROPIUM BROMIDE AND ALBUTEROL SULFATE 2.5; .5 MG/3ML; MG/3ML
1 SOLUTION RESPIRATORY (INHALATION) EVERY 6 HOURS PRN
COMMUNITY
End: 2020-01-01 | Stop reason: SDUPTHER

## 2019-01-01 RX ORDER — MORPHINE SULFATE 4 MG/ML
2 INJECTION, SOLUTION INTRAMUSCULAR; INTRAVENOUS EVERY 4 HOURS PRN
Status: COMPLETED | OUTPATIENT
Start: 2019-01-01 | End: 2019-01-01

## 2019-01-01 RX ORDER — FLUOXETINE HYDROCHLORIDE 20 MG/1
60 CAPSULE ORAL DAILY
Status: ON HOLD | COMMUNITY
End: 2020-01-01

## 2019-01-01 RX ORDER — HEPARIN SODIUM 1000 [USP'U]/ML
60 INJECTION, SOLUTION INTRAVENOUS; SUBCUTANEOUS ONCE
Status: COMPLETED | OUTPATIENT
Start: 2019-01-01 | End: 2019-01-01

## 2019-01-01 RX ORDER — POLYETHYLENE GLYCOL 3350 17 G/17G
17 POWDER, FOR SOLUTION ORAL DAILY PRN
Status: DISCONTINUED | OUTPATIENT
Start: 2019-01-01 | End: 2019-01-01 | Stop reason: HOSPADM

## 2019-01-01 RX ORDER — ARIPIPRAZOLE 2 MG/1
2 TABLET ORAL DAILY
Status: DISCONTINUED | OUTPATIENT
Start: 2019-01-01 | End: 2019-01-01

## 2019-01-01 RX ORDER — HEPARIN SODIUM 1000 [USP'U]/ML
60 INJECTION, SOLUTION INTRAVENOUS; SUBCUTANEOUS PRN
Status: DISCONTINUED | OUTPATIENT
Start: 2019-01-01 | End: 2019-01-01

## 2019-01-01 RX ORDER — HYDROXYZINE HYDROCHLORIDE 25 MG/1
25 TABLET, FILM COATED ORAL EVERY 8 HOURS PRN
Status: ON HOLD | COMMUNITY
End: 2020-01-01

## 2019-01-01 RX ORDER — NITROGLYCERIN 0.4 MG/1
0.4 TABLET SUBLINGUAL EVERY 5 MIN PRN
Status: DISCONTINUED | OUTPATIENT
Start: 2019-01-01 | End: 2019-01-01 | Stop reason: HOSPADM

## 2019-01-01 RX ORDER — HEPARIN SODIUM 1000 [USP'U]/ML
30 INJECTION, SOLUTION INTRAVENOUS; SUBCUTANEOUS PRN
Status: DISCONTINUED | OUTPATIENT
Start: 2019-01-01 | End: 2019-01-01

## 2019-01-01 RX ORDER — SODIUM CHLORIDE 0.9 % (FLUSH) 0.9 %
10 SYRINGE (ML) INJECTION EVERY 12 HOURS SCHEDULED
Status: DISCONTINUED | OUTPATIENT
Start: 2019-01-01 | End: 2019-01-01 | Stop reason: HOSPADM

## 2019-01-01 RX ORDER — GUAIFENESIN 600 MG/1
1200 TABLET, EXTENDED RELEASE ORAL 2 TIMES DAILY
COMMUNITY
End: 2020-01-01

## 2019-01-01 RX ORDER — ATORVASTATIN CALCIUM 40 MG/1
40 TABLET, FILM COATED ORAL NIGHTLY
Status: DISCONTINUED | OUTPATIENT
Start: 2019-01-01 | End: 2019-01-01 | Stop reason: HOSPADM

## 2019-01-01 RX ORDER — SODIUM CHLORIDE 0.9 % (FLUSH) 0.9 %
10 SYRINGE (ML) INJECTION PRN
Status: DISCONTINUED | OUTPATIENT
Start: 2019-01-01 | End: 2019-01-01

## 2019-01-01 RX ORDER — DOCUSATE SODIUM 100 MG/1
100 CAPSULE, LIQUID FILLED ORAL DAILY
Status: DISCONTINUED | OUTPATIENT
Start: 2019-01-01 | End: 2019-01-01 | Stop reason: HOSPADM

## 2019-01-01 RX ORDER — CLOPIDOGREL BISULFATE 75 MG/1
75 TABLET ORAL DAILY
Status: DISCONTINUED | OUTPATIENT
Start: 2019-01-01 | End: 2019-01-01 | Stop reason: HOSPADM

## 2019-01-01 RX ORDER — CARVEDILOL 3.12 MG/1
3.12 TABLET ORAL 2 TIMES DAILY WITH MEALS
COMMUNITY

## 2019-01-01 RX ORDER — FORMOTEROL FUMARATE 20 UG/2ML
20 SOLUTION RESPIRATORY (INHALATION) EVERY 12 HOURS SCHEDULED
Status: DISCONTINUED | OUTPATIENT
Start: 2019-01-01 | End: 2019-01-01 | Stop reason: HOSPADM

## 2019-01-01 RX ORDER — AMOXICILLIN 250 MG
1 CAPSULE ORAL DAILY
Status: ON HOLD | COMMUNITY
End: 2020-01-01

## 2019-01-01 RX ORDER — SODIUM CHLORIDE 9 MG/ML
INJECTION, SOLUTION INTRAVENOUS CONTINUOUS
Status: DISCONTINUED | OUTPATIENT
Start: 2019-01-01 | End: 2019-01-01

## 2019-01-01 RX ORDER — SENNA PLUS 8.6 MG/1
1 TABLET ORAL NIGHTLY
Status: DISCONTINUED | OUTPATIENT
Start: 2019-01-01 | End: 2019-01-01 | Stop reason: HOSPADM

## 2019-01-01 RX ORDER — ATORVASTATIN CALCIUM 40 MG/1
40 TABLET, FILM COATED ORAL NIGHTLY
Status: DISCONTINUED | OUTPATIENT
Start: 2019-01-01 | End: 2019-01-01 | Stop reason: SDUPTHER

## 2019-01-01 RX ORDER — AMLODIPINE BESYLATE 5 MG/1
5 TABLET ORAL DAILY
Status: DISCONTINUED | OUTPATIENT
Start: 2019-01-01 | End: 2019-01-01

## 2019-01-01 RX ORDER — ISOSORBIDE MONONITRATE 30 MG/1
30 TABLET, EXTENDED RELEASE ORAL 2 TIMES DAILY
Status: DISCONTINUED | OUTPATIENT
Start: 2019-01-01 | End: 2019-01-01

## 2019-01-01 RX ORDER — ALBUTEROL SULFATE 2.5 MG/3ML
2.5 SOLUTION RESPIRATORY (INHALATION)
Status: DISCONTINUED | OUTPATIENT
Start: 2019-01-01 | End: 2019-01-01 | Stop reason: HOSPADM

## 2019-01-01 RX ORDER — LEVOFLOXACIN 5 MG/ML
250 INJECTION, SOLUTION INTRAVENOUS EVERY 24 HOURS
Status: DISCONTINUED | OUTPATIENT
Start: 2019-01-01 | End: 2019-01-01

## 2019-01-01 RX ORDER — ASPIRIN 81 MG/1
81 TABLET, CHEWABLE ORAL DAILY
Status: DISCONTINUED | OUTPATIENT
Start: 2019-01-01 | End: 2019-01-01 | Stop reason: SDUPTHER

## 2019-01-01 RX ORDER — PANTOPRAZOLE SODIUM 40 MG/1
40 TABLET, DELAYED RELEASE ORAL
Qty: 30 TABLET | Refills: 3 | Status: SHIPPED | OUTPATIENT
Start: 2019-01-01

## 2019-01-01 RX ORDER — LEVOFLOXACIN 250 MG/1
250 TABLET ORAL DAILY
Qty: 7 TABLET | Refills: 0 | Status: ON HOLD | OUTPATIENT
Start: 2019-01-01 | End: 2019-01-01 | Stop reason: HOSPADM

## 2019-01-01 RX ORDER — IPRATROPIUM BROMIDE AND ALBUTEROL SULFATE 2.5; .5 MG/3ML; MG/3ML
1 SOLUTION RESPIRATORY (INHALATION) 4 TIMES DAILY
Status: DISCONTINUED | OUTPATIENT
Start: 2019-01-01 | End: 2019-01-01 | Stop reason: HOSPADM

## 2019-01-01 RX ORDER — ONDANSETRON 2 MG/ML
4 INJECTION INTRAMUSCULAR; INTRAVENOUS EVERY 6 HOURS PRN
Status: DISCONTINUED | OUTPATIENT
Start: 2019-01-01 | End: 2019-01-01 | Stop reason: HOSPADM

## 2019-01-01 RX ORDER — ISOSORBIDE MONONITRATE 30 MG/1
30 TABLET, EXTENDED RELEASE ORAL DAILY
Status: DISCONTINUED | OUTPATIENT
Start: 2019-01-01 | End: 2019-01-01 | Stop reason: SDUPTHER

## 2019-01-01 RX ORDER — MONTELUKAST SODIUM 10 MG/1
10 TABLET ORAL NIGHTLY
Status: ON HOLD | COMMUNITY
End: 2020-01-01

## 2019-01-01 RX ORDER — ACETAMINOPHEN 325 MG/1
650 TABLET ORAL EVERY 4 HOURS PRN
Status: DISCONTINUED | OUTPATIENT
Start: 2019-01-01 | End: 2019-01-01

## 2019-01-01 RX ORDER — GABAPENTIN 300 MG/1
300 CAPSULE ORAL 3 TIMES DAILY
Status: ON HOLD | COMMUNITY
End: 2019-01-01 | Stop reason: HOSPADM

## 2019-01-01 RX ORDER — HEPARIN SODIUM 5000 [USP'U]/ML
5000 INJECTION, SOLUTION INTRAVENOUS; SUBCUTANEOUS 2 TIMES DAILY
Status: DISCONTINUED | OUTPATIENT
Start: 2019-01-01 | End: 2019-01-01 | Stop reason: HOSPADM

## 2019-01-01 RX ORDER — HEPARIN SODIUM 5000 [USP'U]/ML
5000 INJECTION, SOLUTION INTRAVENOUS; SUBCUTANEOUS EVERY 8 HOURS SCHEDULED
Status: DISCONTINUED | OUTPATIENT
Start: 2019-01-01 | End: 2019-01-01 | Stop reason: HOSPADM

## 2019-01-01 RX ORDER — LEVOFLOXACIN 5 MG/ML
500 INJECTION, SOLUTION INTRAVENOUS ONCE
Status: COMPLETED | OUTPATIENT
Start: 2019-01-01 | End: 2019-01-01

## 2019-01-01 RX ORDER — OXYCODONE AND ACETAMINOPHEN 10; 325 MG/1; MG/1
1 TABLET ORAL EVERY 8 HOURS PRN
Qty: 9 TABLET | Refills: 0 | Status: SHIPPED | OUTPATIENT
Start: 2019-01-01 | End: 2019-01-01

## 2019-01-01 RX ORDER — PANTOPRAZOLE SODIUM 40 MG/1
40 TABLET, DELAYED RELEASE ORAL
Status: DISCONTINUED | OUTPATIENT
Start: 2019-01-01 | End: 2019-01-01 | Stop reason: HOSPADM

## 2019-01-01 RX ORDER — FUROSEMIDE 10 MG/ML
20 INJECTION INTRAMUSCULAR; INTRAVENOUS 2 TIMES DAILY
Status: DISCONTINUED | OUTPATIENT
Start: 2019-01-01 | End: 2019-01-01 | Stop reason: HOSPADM

## 2019-01-01 RX ORDER — FLUOXETINE HYDROCHLORIDE 20 MG/1
60 CAPSULE ORAL DAILY
Status: DISCONTINUED | OUTPATIENT
Start: 2019-01-01 | End: 2019-01-01 | Stop reason: HOSPADM

## 2019-01-01 RX ORDER — BISACODYL 10 MG
10 SUPPOSITORY, RECTAL RECTAL DAILY PRN
Status: DISCONTINUED | OUTPATIENT
Start: 2019-01-01 | End: 2019-01-01 | Stop reason: HOSPADM

## 2019-01-01 RX ORDER — MIRTAZAPINE 7.5 MG/1
7.5 TABLET, FILM COATED ORAL NIGHTLY
Status: DISCONTINUED | OUTPATIENT
Start: 2019-01-01 | End: 2019-01-01 | Stop reason: HOSPADM

## 2019-01-01 RX ORDER — ATORVASTATIN CALCIUM 40 MG/1
40 TABLET, FILM COATED ORAL NIGHTLY
Qty: 30 TABLET | Refills: 3 | Status: SHIPPED | OUTPATIENT
Start: 2019-01-01

## 2019-01-01 RX ORDER — ACETYLCYSTEINE 200 MG/ML
600 SOLUTION ORAL; RESPIRATORY (INHALATION) 2 TIMES DAILY
Status: DISCONTINUED | OUTPATIENT
Start: 2019-01-01 | End: 2019-01-01 | Stop reason: HOSPADM

## 2019-01-01 RX ORDER — OXYCODONE AND ACETAMINOPHEN 10; 325 MG/1; MG/1
1 TABLET ORAL EVERY 8 HOURS PRN
Qty: 9 TABLET | Refills: 0 | Status: ON HOLD | OUTPATIENT
Start: 2019-01-01 | End: 2019-01-01

## 2019-01-01 RX ORDER — ASPIRIN 81 MG/1
81 TABLET ORAL DAILY
Status: DISCONTINUED | OUTPATIENT
Start: 2019-01-01 | End: 2019-01-01 | Stop reason: HOSPADM

## 2019-01-01 RX ORDER — CALCIUM CARBONATE 200(500)MG
500 TABLET,CHEWABLE ORAL 3 TIMES DAILY PRN
Status: DISCONTINUED | OUTPATIENT
Start: 2019-01-01 | End: 2019-01-01 | Stop reason: HOSPADM

## 2019-01-01 RX ORDER — CALCIUM CARBONATE 200(500)MG
500 TABLET,CHEWABLE ORAL 3 TIMES DAILY PRN
Qty: 30 TABLET | Refills: 0 | Status: SHIPPED | OUTPATIENT
Start: 2019-01-01 | End: 2020-01-01

## 2019-01-01 RX ORDER — HALOPERIDOL 5 MG/ML
1 INJECTION INTRAMUSCULAR EVERY 6 HOURS PRN
Status: DISCONTINUED | OUTPATIENT
Start: 2019-01-01 | End: 2019-01-01 | Stop reason: HOSPADM

## 2019-01-01 RX ORDER — LEVOFLOXACIN 5 MG/ML
250 INJECTION, SOLUTION INTRAVENOUS EVERY 24 HOURS
Status: DISCONTINUED | OUTPATIENT
Start: 2019-01-01 | End: 2019-01-01 | Stop reason: HOSPADM

## 2019-01-01 RX ORDER — PREDNISONE 20 MG/1
20 TABLET ORAL DAILY
COMMUNITY
End: 2020-01-01 | Stop reason: SDUPTHER

## 2019-01-01 RX ORDER — RANITIDINE 150 MG/1
150 TABLET ORAL NIGHTLY
Status: ON HOLD | COMMUNITY
End: 2019-01-01 | Stop reason: HOSPADM

## 2019-01-01 RX ORDER — ACETYLCYSTEINE 200 MG/ML
600 SOLUTION ORAL; RESPIRATORY (INHALATION) 2 TIMES DAILY
Qty: 60 ML | Refills: 0 | Status: SHIPPED | OUTPATIENT
Start: 2019-01-01 | End: 2020-01-01

## 2019-01-01 RX ORDER — SENNA AND DOCUSATE SODIUM 50; 8.6 MG/1; MG/1
2 TABLET, FILM COATED ORAL DAILY
Status: DISCONTINUED | OUTPATIENT
Start: 2019-01-01 | End: 2019-01-01 | Stop reason: HOSPADM

## 2019-01-01 RX ORDER — MONTELUKAST SODIUM 10 MG/1
10 TABLET ORAL NIGHTLY
Status: DISCONTINUED | OUTPATIENT
Start: 2019-01-01 | End: 2019-01-01 | Stop reason: HOSPADM

## 2019-01-01 RX ORDER — LEVOFLOXACIN 500 MG/1
500 TABLET, FILM COATED ORAL DAILY
Qty: 5 TABLET | Refills: 0 | Status: SHIPPED | OUTPATIENT
Start: 2019-01-01 | End: 2019-01-01

## 2019-01-01 RX ADMIN — CARVEDILOL 3.12 MG: 6.25 TABLET, FILM COATED ORAL at 17:58

## 2019-01-01 RX ADMIN — FLUOXETINE HYDROCHLORIDE 60 MG: 20 CAPSULE ORAL at 08:36

## 2019-01-01 RX ADMIN — HEPARIN SODIUM 5000 UNITS: 5000 INJECTION INTRAVENOUS; SUBCUTANEOUS at 14:57

## 2019-01-01 RX ADMIN — HEPARIN SODIUM 5000 UNITS: 5000 INJECTION INTRAVENOUS; SUBCUTANEOUS at 14:27

## 2019-01-01 RX ADMIN — HEPARIN SODIUM 3160 UNITS: 1000 INJECTION INTRAVENOUS; SUBCUTANEOUS at 11:17

## 2019-01-01 RX ADMIN — MIRTAZAPINE 7.5 MG: 7.5 TABLET ORAL at 19:59

## 2019-01-01 RX ADMIN — PANTOPRAZOLE SODIUM 40 MG: 40 TABLET, DELAYED RELEASE ORAL at 05:09

## 2019-01-01 RX ADMIN — MIRTAZAPINE 7.5 MG: 7.5 TABLET ORAL at 20:16

## 2019-01-01 RX ADMIN — ATORVASTATIN CALCIUM 40 MG: 40 TABLET, FILM COATED ORAL at 20:15

## 2019-01-01 RX ADMIN — Medication 10 ML: at 08:17

## 2019-01-01 RX ADMIN — CARVEDILOL 3.12 MG: 6.25 TABLET, FILM COATED ORAL at 08:36

## 2019-01-01 RX ADMIN — IPRATROPIUM BROMIDE AND ALBUTEROL SULFATE 1 AMPULE: .5; 3 SOLUTION RESPIRATORY (INHALATION) at 18:45

## 2019-01-01 RX ADMIN — FUROSEMIDE 20 MG: 10 INJECTION, SOLUTION INTRAMUSCULAR; INTRAVENOUS at 09:27

## 2019-01-01 RX ADMIN — CEFTRIAXONE 1 G: 1 INJECTION, POWDER, FOR SOLUTION INTRAMUSCULAR; INTRAVENOUS at 16:06

## 2019-01-01 RX ADMIN — OXYCODONE HYDROCHLORIDE AND ACETAMINOPHEN 1 TABLET: 10; 325 TABLET ORAL at 14:28

## 2019-01-01 RX ADMIN — CLOPIDOGREL BISULFATE 75 MG: 75 TABLET ORAL at 09:00

## 2019-01-01 RX ADMIN — CLOPIDOGREL BISULFATE 75 MG: 75 TABLET ORAL at 08:36

## 2019-01-01 RX ADMIN — CARVEDILOL 3.12 MG: 6.25 TABLET, FILM COATED ORAL at 16:40

## 2019-01-01 RX ADMIN — MONTELUKAST SODIUM 10 MG: 10 TABLET, FILM COATED ORAL at 21:57

## 2019-01-01 RX ADMIN — MIRTAZAPINE 7.5 MG: 7.5 TABLET ORAL at 21:31

## 2019-01-01 RX ADMIN — IPRATROPIUM BROMIDE 0.5 MG: 0.5 SOLUTION RESPIRATORY (INHALATION) at 10:45

## 2019-01-01 RX ADMIN — FORMOTEROL FUMARATE DIHYDRATE 20 MCG: 20 SOLUTION RESPIRATORY (INHALATION) at 06:43

## 2019-01-01 RX ADMIN — CARVEDILOL 3.12 MG: 3.12 TABLET, FILM COATED ORAL at 10:54

## 2019-01-01 RX ADMIN — IPRATROPIUM BROMIDE 0.5 MG: 0.5 SOLUTION RESPIRATORY (INHALATION) at 14:25

## 2019-01-01 RX ADMIN — ASPIRIN 81 MG: 81 TABLET, COATED ORAL at 08:42

## 2019-01-01 RX ADMIN — SENNOSIDES 8.6 MG: 8.6 TABLET, FILM COATED ORAL at 20:30

## 2019-01-01 RX ADMIN — LACTULOSE 20 G: 20 SOLUTION ORAL at 17:46

## 2019-01-01 RX ADMIN — ISOSORBIDE MONONITRATE 30 MG: 30 TABLET, EXTENDED RELEASE ORAL at 09:13

## 2019-01-01 RX ADMIN — SENNOSIDES 8.6 MG: 8.6 TABLET, FILM COATED ORAL at 20:57

## 2019-01-01 RX ADMIN — DOCUSATE SODIUM 100 MG: 100 CAPSULE, LIQUID FILLED ORAL at 14:00

## 2019-01-01 RX ADMIN — ATORVASTATIN CALCIUM 40 MG: 40 TABLET, FILM COATED ORAL at 21:09

## 2019-01-01 RX ADMIN — PREDNISONE 20 MG: 20 TABLET ORAL at 08:27

## 2019-01-01 RX ADMIN — HYDROCORTISONE 2.5%: 25 CREAM TOPICAL at 21:46

## 2019-01-01 RX ADMIN — SENNOSIDES 8.6 MG: 8.6 TABLET, FILM COATED ORAL at 21:02

## 2019-01-01 RX ADMIN — IPRATROPIUM BROMIDE 0.5 MG: 0.5 SOLUTION RESPIRATORY (INHALATION) at 10:22

## 2019-01-01 RX ADMIN — HEPARIN SODIUM 5000 UNITS: 5000 INJECTION INTRAVENOUS; SUBCUTANEOUS at 14:00

## 2019-01-01 RX ADMIN — HEPARIN SODIUM 5000 UNITS: 5000 INJECTION INTRAVENOUS; SUBCUTANEOUS at 14:53

## 2019-01-01 RX ADMIN — ISOSORBIDE MONONITRATE 30 MG: 30 TABLET, EXTENDED RELEASE ORAL at 10:24

## 2019-01-01 RX ADMIN — REGADENOSON 0.4 MG: 0.08 INJECTION, SOLUTION INTRAVENOUS at 13:00

## 2019-01-01 RX ADMIN — ASPIRIN 81 MG: 81 TABLET, COATED ORAL at 08:18

## 2019-01-01 RX ADMIN — HEPARIN SODIUM 5000 UNITS: 5000 INJECTION INTRAVENOUS; SUBCUTANEOUS at 21:56

## 2019-01-01 RX ADMIN — HEPARIN SODIUM 5000 UNITS: 5000 INJECTION INTRAVENOUS; SUBCUTANEOUS at 05:08

## 2019-01-01 RX ADMIN — SENNOSIDES AND DOCUSATE SODIUM 2 TABLET: 8.6; 5 TABLET ORAL at 10:54

## 2019-01-01 RX ADMIN — IPRATROPIUM BROMIDE 0.5 MG: 0.5 SOLUTION RESPIRATORY (INHALATION) at 06:36

## 2019-01-01 RX ADMIN — Medication 10 ML: at 08:48

## 2019-01-01 RX ADMIN — CARVEDILOL 3.12 MG: 6.25 TABLET, FILM COATED ORAL at 17:44

## 2019-01-01 RX ADMIN — MONTELUKAST SODIUM 10 MG: 10 TABLET, FILM COATED ORAL at 21:30

## 2019-01-01 RX ADMIN — MEROPENEM 1 G: 1 INJECTION, POWDER, FOR SOLUTION INTRAVENOUS at 17:08

## 2019-01-01 RX ADMIN — IPRATROPIUM BROMIDE AND ALBUTEROL SULFATE 1 AMPULE: .5; 3 SOLUTION RESPIRATORY (INHALATION) at 19:27

## 2019-01-01 RX ADMIN — OXYCODONE HYDROCHLORIDE AND ACETAMINOPHEN 1 TABLET: 10; 325 TABLET ORAL at 08:47

## 2019-01-01 RX ADMIN — CLOPIDOGREL BISULFATE 75 MG: 75 TABLET ORAL at 08:18

## 2019-01-01 RX ADMIN — IPRATROPIUM BROMIDE 0.5 MG: 0.5 SOLUTION RESPIRATORY (INHALATION) at 10:30

## 2019-01-01 RX ADMIN — ANTACID TABLETS 500 MG: 500 TABLET, CHEWABLE ORAL at 14:53

## 2019-01-01 RX ADMIN — CARVEDILOL 3.12 MG: 3.12 TABLET, FILM COATED ORAL at 17:07

## 2019-01-01 RX ADMIN — HYDROCORTISONE 2.5%: 25 CREAM TOPICAL at 10:10

## 2019-01-01 RX ADMIN — IPRATROPIUM BROMIDE AND ALBUTEROL SULFATE 1 AMPULE: .5; 3 SOLUTION RESPIRATORY (INHALATION) at 11:07

## 2019-01-01 RX ADMIN — IPRATROPIUM BROMIDE 0.5 MG: 0.5 SOLUTION RESPIRATORY (INHALATION) at 06:41

## 2019-01-01 RX ADMIN — IPRATROPIUM BROMIDE 0.5 MG: 0.5 SOLUTION RESPIRATORY (INHALATION) at 10:33

## 2019-01-01 RX ADMIN — IPRATROPIUM BROMIDE 0.5 MG: 0.5 SOLUTION RESPIRATORY (INHALATION) at 06:38

## 2019-01-01 RX ADMIN — IPRATROPIUM BROMIDE 0.5 MG: 0.5 SOLUTION RESPIRATORY (INHALATION) at 10:41

## 2019-01-01 RX ADMIN — HEPARIN SODIUM 5000 UNITS: 5000 INJECTION INTRAVENOUS; SUBCUTANEOUS at 14:33

## 2019-01-01 RX ADMIN — MONTELUKAST SODIUM 10 MG: 10 TABLET, FILM COATED ORAL at 20:35

## 2019-01-01 RX ADMIN — PREDNISONE 20 MG: 20 TABLET ORAL at 08:14

## 2019-01-01 RX ADMIN — FORMOTEROL FUMARATE DIHYDRATE 20 MCG: 20 SOLUTION RESPIRATORY (INHALATION) at 18:45

## 2019-01-01 RX ADMIN — IPRATROPIUM BROMIDE 0.5 MG: 0.5 SOLUTION RESPIRATORY (INHALATION) at 14:50

## 2019-01-01 RX ADMIN — MIRTAZAPINE 7.5 MG: 7.5 TABLET ORAL at 20:57

## 2019-01-01 RX ADMIN — CARVEDILOL 3.12 MG: 3.12 TABLET, FILM COATED ORAL at 08:57

## 2019-01-01 RX ADMIN — OXYCODONE HYDROCHLORIDE AND ACETAMINOPHEN 1 TABLET: 10; 325 TABLET ORAL at 20:57

## 2019-01-01 RX ADMIN — FORMOTEROL FUMARATE DIHYDRATE 20 MCG: 20 SOLUTION RESPIRATORY (INHALATION) at 19:22

## 2019-01-01 RX ADMIN — IPRATROPIUM BROMIDE 0.5 MG: 0.5 SOLUTION RESPIRATORY (INHALATION) at 14:24

## 2019-01-01 RX ADMIN — HYDROXYZINE HYDROCHLORIDE 25 MG: 25 TABLET, FILM COATED ORAL at 21:57

## 2019-01-01 RX ADMIN — Medication 10 ML: at 09:42

## 2019-01-01 RX ADMIN — ASPIRIN 81 MG: 81 TABLET, COATED ORAL at 08:34

## 2019-01-01 RX ADMIN — HEPARIN SODIUM 5000 UNITS: 5000 INJECTION INTRAVENOUS; SUBCUTANEOUS at 14:39

## 2019-01-01 RX ADMIN — OXYCODONE HYDROCHLORIDE AND ACETAMINOPHEN 1 TABLET: 10; 325 TABLET ORAL at 10:09

## 2019-01-01 RX ADMIN — ATORVASTATIN CALCIUM 40 MG: 40 TABLET, FILM COATED ORAL at 19:59

## 2019-01-01 RX ADMIN — IPRATROPIUM BROMIDE 0.5 MG: 0.5 SOLUTION RESPIRATORY (INHALATION) at 19:15

## 2019-01-01 RX ADMIN — CARVEDILOL 3.12 MG: 6.25 TABLET, FILM COATED ORAL at 17:45

## 2019-01-01 RX ADMIN — ATORVASTATIN CALCIUM 40 MG: 40 TABLET, FILM COATED ORAL at 22:12

## 2019-01-01 RX ADMIN — IODIXANOL 182 ML: 320 INJECTION, SOLUTION INTRAVASCULAR at 14:19

## 2019-01-01 RX ADMIN — CLOPIDOGREL BISULFATE 75 MG: 75 TABLET ORAL at 08:48

## 2019-01-01 RX ADMIN — FLUOXETINE HYDROCHLORIDE 60 MG: 20 CAPSULE ORAL at 09:42

## 2019-01-01 RX ADMIN — SENNOSIDES 8.6 MG: 8.6 TABLET, FILM COATED ORAL at 21:30

## 2019-01-01 RX ADMIN — FORMOTEROL FUMARATE DIHYDRATE 20 MCG: 20 SOLUTION RESPIRATORY (INHALATION) at 07:36

## 2019-01-01 RX ADMIN — HYDROCORTISONE 2.5%: 25 CREAM TOPICAL at 08:59

## 2019-01-01 RX ADMIN — HEPARIN SODIUM 5000 UNITS: 5000 INJECTION INTRAVENOUS; SUBCUTANEOUS at 05:34

## 2019-01-01 RX ADMIN — ASPIRIN 81 MG: 81 TABLET, COATED ORAL at 08:45

## 2019-01-01 RX ADMIN — Medication 10 ML: at 22:12

## 2019-01-01 RX ADMIN — PANTOPRAZOLE SODIUM 40 MG: 40 TABLET, DELAYED RELEASE ORAL at 06:11

## 2019-01-01 RX ADMIN — IPRATROPIUM BROMIDE 0.5 MG: 0.5 SOLUTION RESPIRATORY (INHALATION) at 06:57

## 2019-01-01 RX ADMIN — IPRATROPIUM BROMIDE 0.5 MG: 0.5 SOLUTION RESPIRATORY (INHALATION) at 14:30

## 2019-01-01 RX ADMIN — SENNOSIDES 8.6 MG: 8.6 TABLET, FILM COATED ORAL at 20:34

## 2019-01-01 RX ADMIN — LACTULOSE 20 G: 20 SOLUTION ORAL at 06:02

## 2019-01-01 RX ADMIN — AMLODIPINE BESYLATE 5 MG: 5 TABLET ORAL at 18:02

## 2019-01-01 RX ADMIN — FORMOTEROL FUMARATE DIHYDRATE 20 MCG: 20 SOLUTION RESPIRATORY (INHALATION) at 19:29

## 2019-01-01 RX ADMIN — MONTELUKAST SODIUM 10 MG: 10 TABLET, FILM COATED ORAL at 19:26

## 2019-01-01 RX ADMIN — IPRATROPIUM BROMIDE 0.5 MG: 0.5 SOLUTION RESPIRATORY (INHALATION) at 14:10

## 2019-01-01 RX ADMIN — HYDROCORTISONE 2.5%: 25 CREAM TOPICAL at 08:15

## 2019-01-01 RX ADMIN — IPRATROPIUM BROMIDE 0.5 MG: 0.5 SOLUTION RESPIRATORY (INHALATION) at 19:20

## 2019-01-01 RX ADMIN — IPRATROPIUM BROMIDE 0.5 MG: 0.5 SOLUTION RESPIRATORY (INHALATION) at 10:23

## 2019-01-01 RX ADMIN — IPRATROPIUM BROMIDE AND ALBUTEROL SULFATE 1 AMPULE: .5; 3 SOLUTION RESPIRATORY (INHALATION) at 07:45

## 2019-01-01 RX ADMIN — CLOPIDOGREL BISULFATE 75 MG: 75 TABLET ORAL at 08:14

## 2019-01-01 RX ADMIN — MORPHINE SULFATE 2 MG: 4 INJECTION, SOLUTION INTRAMUSCULAR; INTRAVENOUS at 18:02

## 2019-01-01 RX ADMIN — PANTOPRAZOLE SODIUM 40 MG: 40 TABLET, DELAYED RELEASE ORAL at 06:02

## 2019-01-01 RX ADMIN — IPRATROPIUM BROMIDE AND ALBUTEROL SULFATE 1 AMPULE: .5; 3 SOLUTION RESPIRATORY (INHALATION) at 06:59

## 2019-01-01 RX ADMIN — FORMOTEROL FUMARATE DIHYDRATE 20 MCG: 20 SOLUTION RESPIRATORY (INHALATION) at 07:00

## 2019-01-01 RX ADMIN — ATORVASTATIN CALCIUM 40 MG: 40 TABLET, FILM COATED ORAL at 20:35

## 2019-01-01 RX ADMIN — IPRATROPIUM BROMIDE 0.5 MG: 0.5 SOLUTION RESPIRATORY (INHALATION) at 10:26

## 2019-01-01 RX ADMIN — MIRTAZAPINE 7.5 MG: 7.5 TABLET ORAL at 21:57

## 2019-01-01 RX ADMIN — CARVEDILOL 3.12 MG: 6.25 TABLET, FILM COATED ORAL at 17:36

## 2019-01-01 RX ADMIN — Medication 10 ML: at 08:42

## 2019-01-01 RX ADMIN — Medication 10 ML: at 20:47

## 2019-01-01 RX ADMIN — OXYCODONE HYDROCHLORIDE AND ACETAMINOPHEN 1 TABLET: 10; 325 TABLET ORAL at 23:21

## 2019-01-01 RX ADMIN — OXYCODONE HYDROCHLORIDE AND ACETAMINOPHEN 1 TABLET: 10; 325 TABLET ORAL at 20:39

## 2019-01-01 RX ADMIN — ASPIRIN 81 MG: 81 TABLET, COATED ORAL at 09:35

## 2019-01-01 RX ADMIN — CARVEDILOL 3.12 MG: 6.25 TABLET, FILM COATED ORAL at 08:25

## 2019-01-01 RX ADMIN — CARVEDILOL 3.12 MG: 3.12 TABLET, FILM COATED ORAL at 08:48

## 2019-01-01 RX ADMIN — Medication 10 ML: at 08:34

## 2019-01-01 RX ADMIN — ISOSORBIDE MONONITRATE 30 MG: 30 TABLET, EXTENDED RELEASE ORAL at 08:42

## 2019-01-01 RX ADMIN — NITROGLYCERIN 5 MCG/MIN: 20 INJECTION INTRAVENOUS at 23:38

## 2019-01-01 RX ADMIN — Medication 10 ML: at 21:31

## 2019-01-01 RX ADMIN — IPRATROPIUM BROMIDE 0.5 MG: 0.5 SOLUTION RESPIRATORY (INHALATION) at 12:07

## 2019-01-01 RX ADMIN — FORMOTEROL FUMARATE DIHYDRATE 20 MCG: 20 SOLUTION RESPIRATORY (INHALATION) at 18:41

## 2019-01-01 RX ADMIN — HEPARIN SODIUM 5000 UNITS: 5000 INJECTION INTRAVENOUS; SUBCUTANEOUS at 19:32

## 2019-01-01 RX ADMIN — ALBUTEROL SULFATE 2.5 MG: 2.5 SOLUTION RESPIRATORY (INHALATION) at 22:59

## 2019-01-01 RX ADMIN — Medication 10 ML: at 20:35

## 2019-01-01 RX ADMIN — ACETYLCYSTEINE 600 MG: 200 SOLUTION ORAL; RESPIRATORY (INHALATION) at 07:45

## 2019-01-01 RX ADMIN — HYDROCORTISONE 2.5%: 25 CREAM TOPICAL at 21:23

## 2019-01-01 RX ADMIN — LEVOFLOXACIN 250 MG: 5 INJECTION, SOLUTION INTRAVENOUS at 14:27

## 2019-01-01 RX ADMIN — FORMOTEROL FUMARATE DIHYDRATE 20 MCG: 20 SOLUTION RESPIRATORY (INHALATION) at 19:20

## 2019-01-01 RX ADMIN — OXYCODONE HYDROCHLORIDE AND ACETAMINOPHEN 1 TABLET: 10; 325 TABLET ORAL at 13:37

## 2019-01-01 RX ADMIN — CARVEDILOL 3.12 MG: 6.25 TABLET, FILM COATED ORAL at 10:28

## 2019-01-01 RX ADMIN — IPRATROPIUM BROMIDE AND ALBUTEROL SULFATE 1 AMPULE: .5; 3 SOLUTION RESPIRATORY (INHALATION) at 10:05

## 2019-01-01 RX ADMIN — MIRTAZAPINE 7.5 MG: 7.5 TABLET ORAL at 20:09

## 2019-01-01 RX ADMIN — PREDNISONE 20 MG: 20 TABLET ORAL at 09:06

## 2019-01-01 RX ADMIN — CARVEDILOL 3.12 MG: 3.12 TABLET, FILM COATED ORAL at 08:14

## 2019-01-01 RX ADMIN — Medication 10 ML: at 21:46

## 2019-01-01 RX ADMIN — PANTOPRAZOLE SODIUM 40 MG: 40 TABLET, DELAYED RELEASE ORAL at 05:26

## 2019-01-01 RX ADMIN — OXYCODONE HYDROCHLORIDE AND ACETAMINOPHEN 1 TABLET: 10; 325 TABLET ORAL at 02:06

## 2019-01-01 RX ADMIN — IPRATROPIUM BROMIDE 0.5 MG: 0.5 SOLUTION RESPIRATORY (INHALATION) at 10:25

## 2019-01-01 RX ADMIN — ISOSORBIDE MONONITRATE 30 MG: 30 TABLET, EXTENDED RELEASE ORAL at 08:25

## 2019-01-01 RX ADMIN — IPRATROPIUM BROMIDE 0.5 MG: 0.5 SOLUTION RESPIRATORY (INHALATION) at 19:29

## 2019-01-01 RX ADMIN — HEPARIN SODIUM 5000 UNITS: 5000 INJECTION INTRAVENOUS; SUBCUTANEOUS at 13:37

## 2019-01-01 RX ADMIN — ACETAMINOPHEN 650 MG: 325 TABLET ORAL at 01:10

## 2019-01-01 RX ADMIN — ARIPIPRAZOLE 2 MG: 2 TABLET ORAL at 10:23

## 2019-01-01 RX ADMIN — FLUOXETINE HYDROCHLORIDE 60 MG: 20 CAPSULE ORAL at 09:05

## 2019-01-01 RX ADMIN — Medication 10 ML: at 20:59

## 2019-01-01 RX ADMIN — Medication 10 ML: at 14:30

## 2019-01-01 RX ADMIN — ISOSORBIDE MONONITRATE 30 MG: 30 TABLET, EXTENDED RELEASE ORAL at 09:00

## 2019-01-01 RX ADMIN — HEPARIN SODIUM 5000 UNITS: 5000 INJECTION INTRAVENOUS; SUBCUTANEOUS at 15:12

## 2019-01-01 RX ADMIN — CARVEDILOL 3.12 MG: 6.25 TABLET, FILM COATED ORAL at 16:17

## 2019-01-01 RX ADMIN — HYDROCORTISONE 2.5%: 25 CREAM TOPICAL at 20:38

## 2019-01-01 RX ADMIN — OXYCODONE HYDROCHLORIDE AND ACETAMINOPHEN 1 TABLET: 10; 325 TABLET ORAL at 08:17

## 2019-01-01 RX ADMIN — PANTOPRAZOLE SODIUM 40 MG: 40 TABLET, DELAYED RELEASE ORAL at 06:37

## 2019-01-01 RX ADMIN — Medication 10 ML: at 20:37

## 2019-01-01 RX ADMIN — HEPARIN SODIUM 5000 UNITS: 5000 INJECTION INTRAVENOUS; SUBCUTANEOUS at 21:44

## 2019-01-01 RX ADMIN — CARVEDILOL 3.12 MG: 6.25 TABLET, FILM COATED ORAL at 08:27

## 2019-01-01 RX ADMIN — HEPARIN SODIUM 5000 UNITS: 5000 INJECTION INTRAVENOUS; SUBCUTANEOUS at 20:28

## 2019-01-01 RX ADMIN — IPRATROPIUM BROMIDE 0.5 MG: 0.5 SOLUTION RESPIRATORY (INHALATION) at 18:34

## 2019-01-01 RX ADMIN — ISOSORBIDE MONONITRATE 30 MG: 30 TABLET, EXTENDED RELEASE ORAL at 10:09

## 2019-01-01 RX ADMIN — PREDNISONE 20 MG: 20 TABLET ORAL at 08:18

## 2019-01-01 RX ADMIN — Medication 10 ML: at 21:44

## 2019-01-01 RX ADMIN — ASPIRIN 81 MG 81 MG: 81 TABLET ORAL at 10:54

## 2019-01-01 RX ADMIN — ATORVASTATIN CALCIUM 40 MG: 40 TABLET, FILM COATED ORAL at 19:26

## 2019-01-01 RX ADMIN — PANTOPRAZOLE SODIUM 40 MG: 40 TABLET, DELAYED RELEASE ORAL at 08:49

## 2019-01-01 RX ADMIN — MEROPENEM 1 G: 1 INJECTION, POWDER, FOR SOLUTION INTRAVENOUS at 18:24

## 2019-01-01 RX ADMIN — CLOPIDOGREL BISULFATE 75 MG: 75 TABLET ORAL at 09:14

## 2019-01-01 RX ADMIN — ACETYLCYSTEINE 600 MG: 200 SOLUTION ORAL; RESPIRATORY (INHALATION) at 07:00

## 2019-01-01 RX ADMIN — PANTOPRAZOLE SODIUM 40 MG: 40 TABLET, DELAYED RELEASE ORAL at 11:39

## 2019-01-01 RX ADMIN — LEVOFLOXACIN 250 MG: 5 INJECTION, SOLUTION INTRAVENOUS at 14:53

## 2019-01-01 RX ADMIN — FLUOXETINE HYDROCHLORIDE 60 MG: 20 CAPSULE ORAL at 08:34

## 2019-01-01 RX ADMIN — MIRTAZAPINE 7.5 MG: 7.5 TABLET ORAL at 21:09

## 2019-01-01 RX ADMIN — FLUOXETINE HYDROCHLORIDE 60 MG: 20 CAPSULE ORAL at 11:42

## 2019-01-01 RX ADMIN — PREDNISONE 20 MG: 20 TABLET ORAL at 09:14

## 2019-01-01 RX ADMIN — IPRATROPIUM BROMIDE 0.5 MG: 0.5 SOLUTION RESPIRATORY (INHALATION) at 14:54

## 2019-01-01 RX ADMIN — IPRATROPIUM BROMIDE 0.5 MG: 0.5 SOLUTION RESPIRATORY (INHALATION) at 14:43

## 2019-01-01 RX ADMIN — CARVEDILOL 3.12 MG: 6.25 TABLET, FILM COATED ORAL at 08:42

## 2019-01-01 RX ADMIN — OXYCODONE HYDROCHLORIDE AND ACETAMINOPHEN 1 TABLET: 10; 325 TABLET ORAL at 17:13

## 2019-01-01 RX ADMIN — Medication 10 ML: at 10:55

## 2019-01-01 RX ADMIN — HEPARIN SODIUM 5000 UNITS: 5000 INJECTION INTRAVENOUS; SUBCUTANEOUS at 14:13

## 2019-01-01 RX ADMIN — IPRATROPIUM BROMIDE AND ALBUTEROL SULFATE 1 AMPULE: .5; 3 SOLUTION RESPIRATORY (INHALATION) at 06:26

## 2019-01-01 RX ADMIN — OXYCODONE HYDROCHLORIDE AND ACETAMINOPHEN 1 TABLET: 10; 325 TABLET ORAL at 14:41

## 2019-01-01 RX ADMIN — HEPARIN SODIUM 5000 UNITS: 5000 INJECTION INTRAVENOUS; SUBCUTANEOUS at 10:09

## 2019-01-01 RX ADMIN — PANTOPRAZOLE SODIUM 40 MG: 40 TABLET, DELAYED RELEASE ORAL at 06:53

## 2019-01-01 RX ADMIN — IPRATROPIUM BROMIDE 0.5 MG: 0.5 SOLUTION RESPIRATORY (INHALATION) at 10:17

## 2019-01-01 RX ADMIN — PANTOPRAZOLE SODIUM 40 MG: 40 TABLET, DELAYED RELEASE ORAL at 05:47

## 2019-01-01 RX ADMIN — Medication 10 ML: at 08:25

## 2019-01-01 RX ADMIN — POLYETHYLENE GLYCOL 3350 17 G: 17 POWDER, FOR SOLUTION ORAL at 08:28

## 2019-01-01 RX ADMIN — FORMOTEROL FUMARATE DIHYDRATE 20 MCG: 20 SOLUTION RESPIRATORY (INHALATION) at 06:32

## 2019-01-01 RX ADMIN — HEPARIN SODIUM 5000 UNITS: 5000 INJECTION INTRAVENOUS; SUBCUTANEOUS at 05:55

## 2019-01-01 RX ADMIN — PREDNISONE 20 MG: 20 TABLET ORAL at 08:47

## 2019-01-01 RX ADMIN — FUROSEMIDE 20 MG: 10 INJECTION, SOLUTION INTRAMUSCULAR; INTRAVENOUS at 17:46

## 2019-01-01 RX ADMIN — IPRATROPIUM BROMIDE 0.5 MG: 0.5 SOLUTION RESPIRATORY (INHALATION) at 06:32

## 2019-01-01 RX ADMIN — DOCUSATE SODIUM 100 MG: 100 CAPSULE, LIQUID FILLED ORAL at 08:47

## 2019-01-01 RX ADMIN — FORMOTEROL FUMARATE DIHYDRATE 20 MCG: 20 SOLUTION RESPIRATORY (INHALATION) at 07:02

## 2019-01-01 RX ADMIN — CLOPIDOGREL BISULFATE 75 MG: 75 TABLET ORAL at 08:25

## 2019-01-01 RX ADMIN — IPRATROPIUM BROMIDE AND ALBUTEROL SULFATE 1 AMPULE: .5; 3 SOLUTION RESPIRATORY (INHALATION) at 10:56

## 2019-01-01 RX ADMIN — IPRATROPIUM BROMIDE 0.5 MG: 0.5 SOLUTION RESPIRATORY (INHALATION) at 07:07

## 2019-01-01 RX ADMIN — HEPARIN SODIUM 5000 UNITS: 5000 INJECTION INTRAVENOUS; SUBCUTANEOUS at 05:48

## 2019-01-01 RX ADMIN — ATORVASTATIN CALCIUM 40 MG: 40 TABLET, FILM COATED ORAL at 20:39

## 2019-01-01 RX ADMIN — CARVEDILOL 3.12 MG: 6.25 TABLET, FILM COATED ORAL at 17:48

## 2019-01-01 RX ADMIN — HEPARIN SODIUM 5000 UNITS: 5000 INJECTION INTRAVENOUS; SUBCUTANEOUS at 00:46

## 2019-01-01 RX ADMIN — IPRATROPIUM BROMIDE 0.5 MG: 0.5 SOLUTION RESPIRATORY (INHALATION) at 19:22

## 2019-01-01 RX ADMIN — CLOPIDOGREL BISULFATE 75 MG: 75 TABLET ORAL at 08:47

## 2019-01-01 RX ADMIN — IPRATROPIUM BROMIDE 0.5 MG: 0.5 SOLUTION RESPIRATORY (INHALATION) at 15:04

## 2019-01-01 RX ADMIN — IPRATROPIUM BROMIDE AND ALBUTEROL SULFATE 1 AMPULE: .5; 3 SOLUTION RESPIRATORY (INHALATION) at 10:03

## 2019-01-01 RX ADMIN — FLUOXETINE HYDROCHLORIDE 60 MG: 20 CAPSULE ORAL at 08:47

## 2019-01-01 RX ADMIN — CARVEDILOL 3.12 MG: 6.25 TABLET, FILM COATED ORAL at 09:13

## 2019-01-01 RX ADMIN — FORMOTEROL FUMARATE DIHYDRATE 20 MCG: 20 SOLUTION RESPIRATORY (INHALATION) at 18:52

## 2019-01-01 RX ADMIN — ISOSORBIDE MONONITRATE 30 MG: 30 TABLET, EXTENDED RELEASE ORAL at 08:57

## 2019-01-01 RX ADMIN — Medication 10 ML: at 09:00

## 2019-01-01 RX ADMIN — OXYCODONE HYDROCHLORIDE AND ACETAMINOPHEN 1 TABLET: 10; 325 TABLET ORAL at 03:37

## 2019-01-01 RX ADMIN — CARVEDILOL 3.12 MG: 3.12 TABLET, FILM COATED ORAL at 10:09

## 2019-01-01 RX ADMIN — SODIUM CHLORIDE: 9 INJECTION, SOLUTION INTRAVENOUS at 16:06

## 2019-01-01 RX ADMIN — OXYCODONE HYDROCHLORIDE AND ACETAMINOPHEN 1 TABLET: 10; 325 TABLET ORAL at 22:01

## 2019-01-01 RX ADMIN — PREDNISONE 20 MG: 20 TABLET ORAL at 09:00

## 2019-01-01 RX ADMIN — MEROPENEM 1 G: 1 INJECTION, POWDER, FOR SOLUTION INTRAVENOUS at 17:21

## 2019-01-01 RX ADMIN — MIRTAZAPINE 7.5 MG: 7.5 TABLET ORAL at 20:30

## 2019-01-01 RX ADMIN — Medication 10 ML: at 20:27

## 2019-01-01 RX ADMIN — HYDROCORTISONE 2.5%: 25 CREAM TOPICAL at 10:55

## 2019-01-01 RX ADMIN — MIRTAZAPINE 7.5 MG: 7.5 TABLET ORAL at 21:44

## 2019-01-01 RX ADMIN — OXYCODONE HYDROCHLORIDE AND ACETAMINOPHEN 1 TABLET: 10; 325 TABLET ORAL at 05:11

## 2019-01-01 RX ADMIN — SENNOSIDES AND DOCUSATE SODIUM 2 TABLET: 8.6; 5 TABLET ORAL at 08:49

## 2019-01-01 RX ADMIN — ISOSORBIDE MONONITRATE 30 MG: 30 TABLET, EXTENDED RELEASE ORAL at 08:34

## 2019-01-01 RX ADMIN — OXYCODONE HYDROCHLORIDE AND ACETAMINOPHEN 1 TABLET: 10; 325 TABLET ORAL at 20:27

## 2019-01-01 RX ADMIN — PANTOPRAZOLE SODIUM 40 MG: 40 TABLET, DELAYED RELEASE ORAL at 05:48

## 2019-01-01 RX ADMIN — MIRTAZAPINE 7.5 MG: 7.5 TABLET ORAL at 20:35

## 2019-01-01 RX ADMIN — CLOPIDOGREL BISULFATE 75 MG: 75 TABLET ORAL at 08:57

## 2019-01-01 RX ADMIN — IPRATROPIUM BROMIDE 0.5 MG: 0.5 SOLUTION RESPIRATORY (INHALATION) at 18:52

## 2019-01-01 RX ADMIN — FORMOTEROL FUMARATE DIHYDRATE 20 MCG: 20 SOLUTION RESPIRATORY (INHALATION) at 19:15

## 2019-01-01 RX ADMIN — ISOSORBIDE MONONITRATE 30 MG: 30 TABLET, EXTENDED RELEASE ORAL at 08:18

## 2019-01-01 RX ADMIN — FORMOTEROL FUMARATE DIHYDRATE 20 MCG: 20 SOLUTION RESPIRATORY (INHALATION) at 18:49

## 2019-01-01 RX ADMIN — ATORVASTATIN CALCIUM 40 MG: 40 TABLET, FILM COATED ORAL at 21:43

## 2019-01-01 RX ADMIN — IPRATROPIUM BROMIDE AND ALBUTEROL SULFATE 1 AMPULE: .5; 3 SOLUTION RESPIRATORY (INHALATION) at 18:08

## 2019-01-01 RX ADMIN — MEROPENEM 1 G: 1 INJECTION, POWDER, FOR SOLUTION INTRAVENOUS at 04:53

## 2019-01-01 RX ADMIN — HEPARIN SODIUM 5000 UNITS: 5000 INJECTION INTRAVENOUS; SUBCUTANEOUS at 14:14

## 2019-01-01 RX ADMIN — OXYCODONE HYDROCHLORIDE AND ACETAMINOPHEN 1 TABLET: 10; 325 TABLET ORAL at 17:47

## 2019-01-01 RX ADMIN — HEPARIN SODIUM 12 UNITS/KG/HR: 10000 INJECTION, SOLUTION INTRAVENOUS at 21:32

## 2019-01-01 RX ADMIN — HEPARIN SODIUM 5000 UNITS: 5000 INJECTION INTRAVENOUS; SUBCUTANEOUS at 06:44

## 2019-01-01 RX ADMIN — PHENOL 1 SPRAY: 1.5 LIQUID ORAL at 21:56

## 2019-01-01 RX ADMIN — HEPARIN SODIUM 5000 UNITS: 5000 INJECTION INTRAVENOUS; SUBCUTANEOUS at 07:42

## 2019-01-01 RX ADMIN — MIRTAZAPINE 7.5 MG: 7.5 TABLET ORAL at 20:39

## 2019-01-01 RX ADMIN — PREDNISONE 20 MG: 20 TABLET ORAL at 08:44

## 2019-01-01 RX ADMIN — PANTOPRAZOLE SODIUM 40 MG: 40 TABLET, DELAYED RELEASE ORAL at 08:17

## 2019-01-01 RX ADMIN — OXYCODONE HYDROCHLORIDE AND ACETAMINOPHEN 1 TABLET: 10; 325 TABLET ORAL at 05:09

## 2019-01-01 RX ADMIN — LACTULOSE 20 G: 20 SOLUTION ORAL at 11:57

## 2019-01-01 RX ADMIN — Medication 10 ML: at 09:06

## 2019-01-01 RX ADMIN — SENNOSIDES 8.6 MG: 8.6 TABLET, FILM COATED ORAL at 21:44

## 2019-01-01 RX ADMIN — FUROSEMIDE 20 MG: 10 INJECTION, SOLUTION INTRAMUSCULAR; INTRAVENOUS at 18:24

## 2019-01-01 RX ADMIN — OXYCODONE HYDROCHLORIDE AND ACETAMINOPHEN 1 TABLET: 10; 325 TABLET ORAL at 20:09

## 2019-01-01 RX ADMIN — FORMOTEROL FUMARATE DIHYDRATE 20 MCG: 20 SOLUTION RESPIRATORY (INHALATION) at 06:45

## 2019-01-01 RX ADMIN — MONTELUKAST SODIUM 10 MG: 10 TABLET, FILM COATED ORAL at 20:09

## 2019-01-01 RX ADMIN — FORMOTEROL FUMARATE DIHYDRATE 20 MCG: 20 SOLUTION RESPIRATORY (INHALATION) at 06:57

## 2019-01-01 RX ADMIN — MONTELUKAST SODIUM 10 MG: 10 TABLET, FILM COATED ORAL at 19:59

## 2019-01-01 RX ADMIN — HYDROCORTISONE 2.5%: 25 CREAM TOPICAL at 08:51

## 2019-01-01 RX ADMIN — SODIUM CHLORIDE: 9 INJECTION, SOLUTION INTRAVENOUS at 16:40

## 2019-01-01 RX ADMIN — HEPARIN SODIUM 5000 UNITS: 5000 INJECTION INTRAVENOUS; SUBCUTANEOUS at 21:22

## 2019-01-01 RX ADMIN — CLOPIDOGREL BISULFATE 75 MG: 75 TABLET ORAL at 08:44

## 2019-01-01 RX ADMIN — IPRATROPIUM BROMIDE 0.5 MG: 0.5 SOLUTION RESPIRATORY (INHALATION) at 18:49

## 2019-01-01 RX ADMIN — SENNOSIDES AND DOCUSATE SODIUM 2 TABLET: 8.6; 5 TABLET ORAL at 10:09

## 2019-01-01 RX ADMIN — ASPIRIN 81 MG 81 MG: 81 TABLET ORAL at 10:08

## 2019-01-01 RX ADMIN — OXYCODONE HYDROCHLORIDE AND ACETAMINOPHEN 1 TABLET: 10; 325 TABLET ORAL at 14:06

## 2019-01-01 RX ADMIN — OXYCODONE HYDROCHLORIDE AND ACETAMINOPHEN 1 TABLET: 10; 325 TABLET ORAL at 00:39

## 2019-01-01 RX ADMIN — LEVOFLOXACIN 250 MG: 5 INJECTION, SOLUTION INTRAVENOUS at 14:00

## 2019-01-01 RX ADMIN — HEPARIN SODIUM 5000 UNITS: 5000 INJECTION INTRAVENOUS; SUBCUTANEOUS at 22:49

## 2019-01-01 RX ADMIN — OXYCODONE HYDROCHLORIDE AND ACETAMINOPHEN 1 TABLET: 10; 325 TABLET ORAL at 19:23

## 2019-01-01 RX ADMIN — FORMOTEROL FUMARATE DIHYDRATE 20 MCG: 20 SOLUTION RESPIRATORY (INHALATION) at 06:39

## 2019-01-01 RX ADMIN — CARVEDILOL 3.12 MG: 6.25 TABLET, FILM COATED ORAL at 08:47

## 2019-01-01 RX ADMIN — FLUOXETINE HYDROCHLORIDE 60 MG: 20 CAPSULE ORAL at 09:14

## 2019-01-01 RX ADMIN — MIRTAZAPINE 7.5 MG: 7.5 TABLET ORAL at 20:26

## 2019-01-01 RX ADMIN — ATORVASTATIN CALCIUM 40 MG: 40 TABLET, FILM COATED ORAL at 21:03

## 2019-01-01 RX ADMIN — ISOSORBIDE MONONITRATE 30 MG: 30 TABLET, EXTENDED RELEASE ORAL at 08:27

## 2019-01-01 RX ADMIN — IPRATROPIUM BROMIDE AND ALBUTEROL SULFATE 1 AMPULE: .5; 3 SOLUTION RESPIRATORY (INHALATION) at 14:20

## 2019-01-01 RX ADMIN — IPRATROPIUM BROMIDE 0.5 MG: 0.5 SOLUTION RESPIRATORY (INHALATION) at 15:20

## 2019-01-01 RX ADMIN — CARVEDILOL 3.12 MG: 6.25 TABLET, FILM COATED ORAL at 08:18

## 2019-01-01 RX ADMIN — HYDROXYZINE HYDROCHLORIDE 25 MG: 25 TABLET, FILM COATED ORAL at 19:31

## 2019-01-01 RX ADMIN — PANTOPRAZOLE SODIUM 40 MG: 40 TABLET, DELAYED RELEASE ORAL at 10:54

## 2019-01-01 RX ADMIN — HEPARIN SODIUM 5000 UNITS: 5000 INJECTION INTRAVENOUS; SUBCUTANEOUS at 21:03

## 2019-01-01 RX ADMIN — IPRATROPIUM BROMIDE 0.5 MG: 0.5 SOLUTION RESPIRATORY (INHALATION) at 22:12

## 2019-01-01 RX ADMIN — SENNOSIDES AND DOCUSATE SODIUM 2 TABLET: 8.6; 5 TABLET ORAL at 08:14

## 2019-01-01 RX ADMIN — SODIUM CHLORIDE: 9 INJECTION, SOLUTION INTRAVENOUS at 11:00

## 2019-01-01 RX ADMIN — OXYCODONE HYDROCHLORIDE AND ACETAMINOPHEN 1 TABLET: 10; 325 TABLET ORAL at 06:37

## 2019-01-01 RX ADMIN — ATORVASTATIN CALCIUM 40 MG: 40 TABLET, FILM COATED ORAL at 21:46

## 2019-01-01 RX ADMIN — IPRATROPIUM BROMIDE 0.5 MG: 0.5 SOLUTION RESPIRATORY (INHALATION) at 10:15

## 2019-01-01 RX ADMIN — IPRATROPIUM BROMIDE 0.5 MG: 0.5 SOLUTION RESPIRATORY (INHALATION) at 14:32

## 2019-01-01 RX ADMIN — IPRATROPIUM BROMIDE 0.5 MG: 0.5 SOLUTION RESPIRATORY (INHALATION) at 18:21

## 2019-01-01 RX ADMIN — HEPARIN SODIUM 12 UNITS/KG/HR: 10000 INJECTION, SOLUTION INTRAVENOUS at 11:44

## 2019-01-01 RX ADMIN — CARVEDILOL 3.12 MG: 3.12 TABLET, FILM COATED ORAL at 17:21

## 2019-01-01 RX ADMIN — PREDNISONE 20 MG: 20 TABLET ORAL at 10:09

## 2019-01-01 RX ADMIN — ASPIRIN 81 MG: 81 TABLET, COATED ORAL at 09:05

## 2019-01-01 RX ADMIN — HEPARIN SODIUM 5000 UNITS: 5000 INJECTION INTRAVENOUS; SUBCUTANEOUS at 14:20

## 2019-01-01 RX ADMIN — SENNOSIDES 8.6 MG: 8.6 TABLET, FILM COATED ORAL at 19:26

## 2019-01-01 RX ADMIN — OXYCODONE HYDROCHLORIDE AND ACETAMINOPHEN 1 TABLET: 10; 325 TABLET ORAL at 05:06

## 2019-01-01 RX ADMIN — CARVEDILOL 3.12 MG: 6.25 TABLET, FILM COATED ORAL at 16:14

## 2019-01-01 RX ADMIN — ACETYLCYSTEINE 600 MG: 200 SOLUTION ORAL; RESPIRATORY (INHALATION) at 06:18

## 2019-01-01 RX ADMIN — FORMOTEROL FUMARATE DIHYDRATE 20 MCG: 20 SOLUTION RESPIRATORY (INHALATION) at 18:22

## 2019-01-01 RX ADMIN — IPRATROPIUM BROMIDE 0.5 MG: 0.5 SOLUTION RESPIRATORY (INHALATION) at 11:05

## 2019-01-01 RX ADMIN — MONTELUKAST SODIUM 10 MG: 10 TABLET, FILM COATED ORAL at 22:58

## 2019-01-01 RX ADMIN — HYDROXYZINE HYDROCHLORIDE 25 MG: 25 TABLET, FILM COATED ORAL at 22:58

## 2019-01-01 RX ADMIN — ACETYLCYSTEINE 600 MG: 200 SOLUTION ORAL; RESPIRATORY (INHALATION) at 19:27

## 2019-01-01 RX ADMIN — Medication 10 ML: at 09:15

## 2019-01-01 RX ADMIN — PANTOPRAZOLE SODIUM 40 MG: 40 TABLET, DELAYED RELEASE ORAL at 06:44

## 2019-01-01 RX ADMIN — DOCUSATE SODIUM 100 MG: 100 CAPSULE, LIQUID FILLED ORAL at 09:00

## 2019-01-01 RX ADMIN — FORMOTEROL FUMARATE DIHYDRATE 20 MCG: 20 SOLUTION RESPIRATORY (INHALATION) at 18:51

## 2019-01-01 RX ADMIN — PANTOPRAZOLE SODIUM 40 MG: 40 TABLET, DELAYED RELEASE ORAL at 07:42

## 2019-01-01 RX ADMIN — IPRATROPIUM BROMIDE 0.5 MG: 0.5 SOLUTION RESPIRATORY (INHALATION) at 10:31

## 2019-01-01 RX ADMIN — CEFTRIAXONE 1 G: 1 INJECTION, POWDER, FOR SOLUTION INTRAMUSCULAR; INTRAVENOUS at 16:40

## 2019-01-01 RX ADMIN — MEROPENEM 1 G: 1 INJECTION, POWDER, FOR SOLUTION INTRAVENOUS at 05:41

## 2019-01-01 RX ADMIN — ASPIRIN 81 MG 81 MG: 81 TABLET ORAL at 08:48

## 2019-01-01 RX ADMIN — ISOSORBIDE MONONITRATE 30 MG: 30 TABLET, EXTENDED RELEASE ORAL at 08:36

## 2019-01-01 RX ADMIN — HEPARIN SODIUM 10 UNITS/KG/HR: 10000 INJECTION, SOLUTION INTRAVENOUS at 06:30

## 2019-01-01 RX ADMIN — IPRATROPIUM BROMIDE AND ALBUTEROL SULFATE 1 AMPULE: .5; 3 SOLUTION RESPIRATORY (INHALATION) at 14:24

## 2019-01-01 RX ADMIN — TETRAKIS(2-METHOXYISOBUTYLISOCYANIDE)COPPER(I) TETRAFLUOROBORATE 10 MILLICURIE: 1 INJECTION, POWDER, LYOPHILIZED, FOR SOLUTION INTRAVENOUS at 10:22

## 2019-01-01 RX ADMIN — ISOSORBIDE MONONITRATE 30 MG: 30 TABLET, EXTENDED RELEASE ORAL at 09:05

## 2019-01-01 RX ADMIN — Medication 10 ML: at 08:00

## 2019-01-01 RX ADMIN — IPRATROPIUM BROMIDE 0.5 MG: 0.5 SOLUTION RESPIRATORY (INHALATION) at 15:02

## 2019-01-01 RX ADMIN — CARVEDILOL 3.12 MG: 6.25 TABLET, FILM COATED ORAL at 09:42

## 2019-01-01 RX ADMIN — ATORVASTATIN CALCIUM 40 MG: 40 TABLET, FILM COATED ORAL at 21:57

## 2019-01-01 RX ADMIN — HEPARIN SODIUM 5000 UNITS: 5000 INJECTION INTRAVENOUS; SUBCUTANEOUS at 07:20

## 2019-01-01 RX ADMIN — CARVEDILOL 3.12 MG: 3.12 TABLET, FILM COATED ORAL at 17:06

## 2019-01-01 RX ADMIN — HEPARIN SODIUM 5000 UNITS: 5000 INJECTION INTRAVENOUS; SUBCUTANEOUS at 07:17

## 2019-01-01 RX ADMIN — ATORVASTATIN CALCIUM 40 MG: 40 TABLET, FILM COATED ORAL at 21:22

## 2019-01-01 RX ADMIN — PREDNISONE 20 MG: 20 TABLET ORAL at 10:24

## 2019-01-01 RX ADMIN — HEPARIN SODIUM 5000 UNITS: 5000 INJECTION INTRAVENOUS; SUBCUTANEOUS at 22:01

## 2019-01-01 RX ADMIN — MIRTAZAPINE 7.5 MG: 7.5 TABLET ORAL at 19:26

## 2019-01-01 RX ADMIN — CARVEDILOL 3.12 MG: 3.12 TABLET, FILM COATED ORAL at 18:24

## 2019-01-01 RX ADMIN — IPRATROPIUM BROMIDE 0.5 MG: 0.5 SOLUTION RESPIRATORY (INHALATION) at 18:51

## 2019-01-01 RX ADMIN — Medication 10 ML: at 08:37

## 2019-01-01 RX ADMIN — AMLODIPINE BESYLATE 5 MG: 5 TABLET ORAL at 09:35

## 2019-01-01 RX ADMIN — FORMOTEROL FUMARATE DIHYDRATE 20 MCG: 20 SOLUTION RESPIRATORY (INHALATION) at 18:33

## 2019-01-01 RX ADMIN — HYDROCORTISONE 2.5%: 25 CREAM TOPICAL at 17:09

## 2019-01-01 RX ADMIN — IPRATROPIUM BROMIDE 0.5 MG: 0.5 SOLUTION RESPIRATORY (INHALATION) at 10:28

## 2019-01-01 RX ADMIN — CARVEDILOL 3.12 MG: 3.12 TABLET, FILM COATED ORAL at 11:39

## 2019-01-01 RX ADMIN — ISOSORBIDE MONONITRATE 30 MG: 30 TABLET, EXTENDED RELEASE ORAL at 08:44

## 2019-01-01 RX ADMIN — CLOPIDOGREL BISULFATE 75 MG: 75 TABLET ORAL at 10:08

## 2019-01-01 RX ADMIN — PREDNISONE 20 MG: 20 TABLET ORAL at 08:42

## 2019-01-01 RX ADMIN — FUROSEMIDE 20 MG: 10 INJECTION, SOLUTION INTRAMUSCULAR; INTRAVENOUS at 08:51

## 2019-01-01 RX ADMIN — PREDNISONE 20 MG: 20 TABLET ORAL at 11:39

## 2019-01-01 RX ADMIN — CLOPIDOGREL BISULFATE 75 MG: 75 TABLET ORAL at 17:44

## 2019-01-01 RX ADMIN — TETRAKIS(2-METHOXYISOBUTYLISOCYANIDE)COPPER(I) TETRAFLUOROBORATE 30 MILLICURIE: 1 INJECTION, POWDER, LYOPHILIZED, FOR SOLUTION INTRAVENOUS at 10:22

## 2019-01-01 RX ADMIN — ACETYLCYSTEINE 600 MG: 200 SOLUTION ORAL; RESPIRATORY (INHALATION) at 10:05

## 2019-01-01 RX ADMIN — CLOPIDOGREL BISULFATE 75 MG: 75 TABLET ORAL at 22:12

## 2019-01-01 RX ADMIN — ISOSORBIDE MONONITRATE 30 MG: 30 TABLET, EXTENDED RELEASE ORAL at 08:48

## 2019-01-01 RX ADMIN — PANTOPRAZOLE SODIUM 40 MG: 40 TABLET, DELAYED RELEASE ORAL at 10:28

## 2019-01-01 RX ADMIN — SENNOSIDES 8.6 MG: 8.6 TABLET, FILM COATED ORAL at 20:09

## 2019-01-01 RX ADMIN — DOCUSATE SODIUM 100 MG: 100 CAPSULE, LIQUID FILLED ORAL at 08:36

## 2019-01-01 RX ADMIN — HEPARIN SODIUM 5000 UNITS: 5000 INJECTION INTRAVENOUS; SUBCUTANEOUS at 11:57

## 2019-01-01 RX ADMIN — HEPARIN SODIUM 5000 UNITS: 5000 INJECTION INTRAVENOUS; SUBCUTANEOUS at 08:37

## 2019-01-01 RX ADMIN — FORMOTEROL FUMARATE DIHYDRATE 20 MCG: 20 SOLUTION RESPIRATORY (INHALATION) at 19:02

## 2019-01-01 RX ADMIN — CLOPIDOGREL BISULFATE 75 MG: 75 TABLET ORAL at 08:27

## 2019-01-01 RX ADMIN — Medication 10 ML: at 20:16

## 2019-01-01 RX ADMIN — ASPIRIN 81 MG 81 MG: 81 TABLET ORAL at 08:14

## 2019-01-01 RX ADMIN — Medication 10 ML: at 08:19

## 2019-01-01 RX ADMIN — CARVEDILOL 3.12 MG: 6.25 TABLET, FILM COATED ORAL at 20:23

## 2019-01-01 RX ADMIN — MONTELUKAST SODIUM 10 MG: 10 TABLET, FILM COATED ORAL at 21:09

## 2019-01-01 RX ADMIN — FLUOXETINE HYDROCHLORIDE 20 MG: 20 CAPSULE ORAL at 10:24

## 2019-01-01 RX ADMIN — FORMOTEROL FUMARATE DIHYDRATE 20 MCG: 20 SOLUTION RESPIRATORY (INHALATION) at 18:35

## 2019-01-01 RX ADMIN — Medication 10 ML: at 10:09

## 2019-01-01 RX ADMIN — MONTELUKAST SODIUM 10 MG: 10 TABLET, FILM COATED ORAL at 21:03

## 2019-01-01 RX ADMIN — CARVEDILOL 3.12 MG: 3.12 TABLET, FILM COATED ORAL at 17:46

## 2019-01-01 RX ADMIN — ACETYLCYSTEINE 600 MG: 200 SOLUTION ORAL; RESPIRATORY (INHALATION) at 06:26

## 2019-01-01 RX ADMIN — CARVEDILOL 3.12 MG: 6.25 TABLET, FILM COATED ORAL at 17:49

## 2019-01-01 RX ADMIN — SODIUM CHLORIDE: 9 INJECTION, SOLUTION INTRAVENOUS at 14:53

## 2019-01-01 RX ADMIN — PHENOL 1 SPRAY: 1.5 LIQUID ORAL at 02:57

## 2019-01-01 RX ADMIN — ATORVASTATIN CALCIUM 40 MG: 40 TABLET, FILM COATED ORAL at 20:31

## 2019-01-01 RX ADMIN — ASPIRIN 81 MG: 81 TABLET, COATED ORAL at 08:25

## 2019-01-01 RX ADMIN — HEPARIN SODIUM 5000 UNITS: 5000 INJECTION INTRAVENOUS; SUBCUTANEOUS at 20:59

## 2019-01-01 RX ADMIN — IPRATROPIUM BROMIDE 0.5 MG: 0.5 SOLUTION RESPIRATORY (INHALATION) at 07:36

## 2019-01-01 RX ADMIN — MEROPENEM 1 G: 1 INJECTION, POWDER, FOR SOLUTION INTRAVENOUS at 05:10

## 2019-01-01 RX ADMIN — HEPARIN SODIUM 5000 UNITS: 5000 INJECTION INTRAVENOUS; SUBCUTANEOUS at 08:50

## 2019-01-01 RX ADMIN — MONTELUKAST SODIUM 10 MG: 10 TABLET, FILM COATED ORAL at 20:15

## 2019-01-01 RX ADMIN — MIRTAZAPINE 7.5 MG: 7.5 TABLET ORAL at 21:02

## 2019-01-01 RX ADMIN — IPRATROPIUM BROMIDE 0.5 MG: 0.5 SOLUTION RESPIRATORY (INHALATION) at 06:59

## 2019-01-01 RX ADMIN — FUROSEMIDE 20 MG: 10 INJECTION, SOLUTION INTRAMUSCULAR; INTRAVENOUS at 10:55

## 2019-01-01 RX ADMIN — OXYCODONE HYDROCHLORIDE AND ACETAMINOPHEN 1 TABLET: 10; 325 TABLET ORAL at 21:57

## 2019-01-01 RX ADMIN — SODIUM CHLORIDE: 9 INJECTION, SOLUTION INTRAVENOUS at 02:48

## 2019-01-01 RX ADMIN — Medication 10 ML: at 19:26

## 2019-01-01 RX ADMIN — ACETAMINOPHEN 650 MG: 325 TABLET ORAL at 06:52

## 2019-01-01 RX ADMIN — IPRATROPIUM BROMIDE 0.5 MG: 0.5 SOLUTION RESPIRATORY (INHALATION) at 11:07

## 2019-01-01 RX ADMIN — CARVEDILOL 3.12 MG: 6.25 TABLET, FILM COATED ORAL at 17:11

## 2019-01-01 RX ADMIN — MONTELUKAST SODIUM 10 MG: 10 TABLET, FILM COATED ORAL at 20:57

## 2019-01-01 RX ADMIN — CLOPIDOGREL BISULFATE 75 MG: 75 TABLET ORAL at 08:34

## 2019-01-01 RX ADMIN — HEPARIN SODIUM 5000 UNITS: 5000 INJECTION INTRAVENOUS; SUBCUTANEOUS at 05:26

## 2019-01-01 RX ADMIN — ASPIRIN 81 MG: 81 TABLET, COATED ORAL at 08:36

## 2019-01-01 RX ADMIN — IPRATROPIUM BROMIDE 0.5 MG: 0.5 SOLUTION RESPIRATORY (INHALATION) at 06:45

## 2019-01-01 RX ADMIN — ATORVASTATIN CALCIUM 40 MG: 40 TABLET, FILM COATED ORAL at 21:01

## 2019-01-01 RX ADMIN — FORMOTEROL FUMARATE DIHYDRATE 20 MCG: 20 SOLUTION RESPIRATORY (INHALATION) at 07:22

## 2019-01-01 RX ADMIN — OXYCODONE HYDROCHLORIDE AND ACETAMINOPHEN 1 TABLET: 10; 325 TABLET ORAL at 21:05

## 2019-01-01 RX ADMIN — Medication 10 ML: at 21:23

## 2019-01-01 RX ADMIN — ATORVASTATIN CALCIUM 40 MG: 40 TABLET, FILM COATED ORAL at 21:30

## 2019-01-01 RX ADMIN — HEPARIN SODIUM 5000 UNITS: 5000 INJECTION INTRAVENOUS; SUBCUTANEOUS at 06:11

## 2019-01-01 RX ADMIN — HEPARIN SODIUM 3280 UNITS: 1000 INJECTION INTRAVENOUS; SUBCUTANEOUS at 11:41

## 2019-01-01 RX ADMIN — LEVOFLOXACIN 500 MG: 5 INJECTION, SOLUTION INTRAVENOUS at 13:37

## 2019-01-01 RX ADMIN — IPRATROPIUM BROMIDE AND ALBUTEROL SULFATE 1 AMPULE: .5; 3 SOLUTION RESPIRATORY (INHALATION) at 18:35

## 2019-01-01 RX ADMIN — Medication 10 ML: at 21:57

## 2019-01-01 RX ADMIN — CARVEDILOL 3.12 MG: 6.25 TABLET, FILM COATED ORAL at 09:06

## 2019-01-01 RX ADMIN — CLOPIDOGREL BISULFATE 75 MG: 75 TABLET ORAL at 09:05

## 2019-01-01 RX ADMIN — ACETYLCYSTEINE 600 MG: 200 SOLUTION ORAL; RESPIRATORY (INHALATION) at 18:35

## 2019-01-01 RX ADMIN — OXYCODONE HYDROCHLORIDE AND ACETAMINOPHEN 1 TABLET: 10; 325 TABLET ORAL at 19:30

## 2019-01-01 RX ADMIN — Medication 10 ML: at 08:36

## 2019-01-01 RX ADMIN — ACETYLCYSTEINE 600 MG: 200 SOLUTION ORAL; RESPIRATORY (INHALATION) at 18:08

## 2019-01-01 RX ADMIN — PREDNISONE 20 MG: 20 TABLET ORAL at 09:35

## 2019-01-01 RX ADMIN — CARVEDILOL 3.12 MG: 6.25 TABLET, FILM COATED ORAL at 08:44

## 2019-01-01 RX ADMIN — HEPARIN SODIUM 5000 UNITS: 5000 INJECTION INTRAVENOUS; SUBCUTANEOUS at 06:00

## 2019-01-01 RX ADMIN — OXYCODONE HYDROCHLORIDE AND ACETAMINOPHEN 1 TABLET: 10; 325 TABLET ORAL at 17:59

## 2019-01-01 RX ADMIN — IPRATROPIUM BROMIDE AND ALBUTEROL SULFATE 1 AMPULE: .5; 3 SOLUTION RESPIRATORY (INHALATION) at 14:27

## 2019-01-01 RX ADMIN — OXYCODONE HYDROCHLORIDE AND ACETAMINOPHEN 1 TABLET: 10; 325 TABLET ORAL at 21:59

## 2019-01-01 RX ADMIN — SODIUM CHLORIDE: 9 INJECTION, SOLUTION INTRAVENOUS at 08:18

## 2019-01-01 RX ADMIN — ISOSORBIDE MONONITRATE 30 MG: 30 TABLET, EXTENDED RELEASE ORAL at 17:46

## 2019-01-01 RX ADMIN — LEVOFLOXACIN 250 MG: 5 INJECTION, SOLUTION INTRAVENOUS at 14:33

## 2019-01-01 RX ADMIN — LEVOFLOXACIN 250 MG: 5 INJECTION, SOLUTION INTRAVENOUS at 14:15

## 2019-01-01 RX ADMIN — IPRATROPIUM BROMIDE 0.5 MG: 0.5 SOLUTION RESPIRATORY (INHALATION) at 18:41

## 2019-01-01 RX ADMIN — ISOSORBIDE MONONITRATE 30 MG: 30 TABLET, EXTENDED RELEASE ORAL at 10:55

## 2019-01-01 RX ADMIN — PANTOPRAZOLE SODIUM 40 MG: 40 TABLET, DELAYED RELEASE ORAL at 08:36

## 2019-01-01 RX ADMIN — ATORVASTATIN CALCIUM 40 MG: 40 TABLET, FILM COATED ORAL at 22:58

## 2019-01-01 RX ADMIN — MONTELUKAST SODIUM 10 MG: 10 TABLET, FILM COATED ORAL at 20:39

## 2019-01-01 RX ADMIN — FUROSEMIDE 20 MG: 10 INJECTION, SOLUTION INTRAMUSCULAR; INTRAVENOUS at 10:09

## 2019-01-01 RX ADMIN — DOCUSATE SODIUM 100 MG: 100 CAPSULE, LIQUID FILLED ORAL at 08:27

## 2019-01-01 RX ADMIN — Medication 10 ML: at 08:45

## 2019-01-01 RX ADMIN — MONTELUKAST SODIUM 10 MG: 10 TABLET, FILM COATED ORAL at 20:31

## 2019-01-01 RX ADMIN — ASPIRIN 81 MG: 81 TABLET, COATED ORAL at 09:42

## 2019-01-01 RX ADMIN — HEPARIN SODIUM 5000 UNITS: 5000 INJECTION INTRAVENOUS; SUBCUTANEOUS at 22:08

## 2019-01-01 RX ADMIN — MONTELUKAST SODIUM 10 MG: 10 TABLET, FILM COATED ORAL at 20:26

## 2019-01-01 RX ADMIN — CLOPIDOGREL BISULFATE 75 MG: 75 TABLET ORAL at 08:42

## 2019-01-01 RX ADMIN — SODIUM CHLORIDE 1000 ML: 9 INJECTION, SOLUTION INTRAVENOUS at 09:43

## 2019-01-01 RX ADMIN — ASPIRIN 81 MG: 81 TABLET, COATED ORAL at 10:24

## 2019-01-01 RX ADMIN — MEROPENEM 1 G: 1 INJECTION, POWDER, FOR SOLUTION INTRAVENOUS at 06:02

## 2019-01-01 RX ADMIN — CARVEDILOL 3.12 MG: 6.25 TABLET, FILM COATED ORAL at 17:14

## 2019-01-01 RX ADMIN — NITROGLYCERIN 5 MCG/MIN: 20 INJECTION INTRAVENOUS at 20:45

## 2019-01-01 RX ADMIN — PANTOPRAZOLE SODIUM 40 MG: 40 TABLET, DELAYED RELEASE ORAL at 05:34

## 2019-01-01 RX ADMIN — IPRATROPIUM BROMIDE 0.5 MG: 0.5 SOLUTION RESPIRATORY (INHALATION) at 18:32

## 2019-01-01 RX ADMIN — Medication 10 ML: at 08:28

## 2019-01-01 RX ADMIN — IPRATROPIUM BROMIDE 0.5 MG: 0.5 SOLUTION RESPIRATORY (INHALATION) at 07:22

## 2019-01-01 RX ADMIN — DOCUSATE SODIUM 100 MG: 100 CAPSULE, LIQUID FILLED ORAL at 09:14

## 2019-01-01 RX ADMIN — ASPIRIN 81 MG: 81 TABLET, COATED ORAL at 09:14

## 2019-01-01 RX ADMIN — ATORVASTATIN CALCIUM 40 MG: 40 TABLET, FILM COATED ORAL at 20:37

## 2019-01-01 RX ADMIN — ISOSORBIDE MONONITRATE 30 MG: 30 TABLET, EXTENDED RELEASE ORAL at 11:39

## 2019-01-01 RX ADMIN — PREDNISONE 20 MG: 20 TABLET ORAL at 08:36

## 2019-01-01 RX ADMIN — ATORVASTATIN CALCIUM 40 MG: 40 TABLET, FILM COATED ORAL at 20:09

## 2019-01-01 RX ADMIN — CARVEDILOL 3.12 MG: 6.25 TABLET, FILM COATED ORAL at 09:00

## 2019-01-01 RX ADMIN — FORMOTEROL FUMARATE DIHYDRATE 20 MCG: 20 SOLUTION RESPIRATORY (INHALATION) at 06:46

## 2019-01-01 RX ADMIN — ISOSORBIDE MONONITRATE 30 MG: 30 TABLET, EXTENDED RELEASE ORAL at 09:42

## 2019-01-01 RX ADMIN — IPRATROPIUM BROMIDE 0.5 MG: 0.5 SOLUTION RESPIRATORY (INHALATION) at 14:39

## 2019-01-01 RX ADMIN — FORMOTEROL FUMARATE DIHYDRATE 20 MCG: 20 SOLUTION RESPIRATORY (INHALATION) at 07:08

## 2019-01-01 RX ADMIN — MORPHINE SULFATE 2 MG: 4 INJECTION, SOLUTION INTRAMUSCULAR; INTRAVENOUS at 23:28

## 2019-01-01 RX ADMIN — MONTELUKAST SODIUM 10 MG: 10 TABLET, FILM COATED ORAL at 21:44

## 2019-01-01 RX ADMIN — MEROPENEM 1 G: 1 INJECTION, POWDER, FOR SOLUTION INTRAVENOUS at 17:45

## 2019-01-01 RX ADMIN — IPRATROPIUM BROMIDE 0.5 MG: 0.5 SOLUTION RESPIRATORY (INHALATION) at 14:15

## 2019-01-01 RX ADMIN — CARVEDILOL 3.12 MG: 6.25 TABLET, FILM COATED ORAL at 08:34

## 2019-01-01 RX ADMIN — FORMOTEROL FUMARATE DIHYDRATE 20 MCG: 20 SOLUTION RESPIRATORY (INHALATION) at 06:41

## 2019-01-01 RX ADMIN — Medication 10 ML: at 08:49

## 2019-01-01 RX ADMIN — DOCUSATE SODIUM 100 MG: 100 CAPSULE, LIQUID FILLED ORAL at 08:34

## 2019-01-01 RX ADMIN — PREDNISONE 20 MG: 20 TABLET ORAL at 09:42

## 2019-01-01 RX ADMIN — FUROSEMIDE 20 MG: 10 INJECTION, SOLUTION INTRAMUSCULAR; INTRAVENOUS at 17:06

## 2019-01-01 RX ADMIN — OXYCODONE HYDROCHLORIDE AND ACETAMINOPHEN 1 TABLET: 10; 325 TABLET ORAL at 05:48

## 2019-01-01 RX ADMIN — ATORVASTATIN CALCIUM 40 MG: 40 TABLET, FILM COATED ORAL at 20:26

## 2019-01-01 RX ADMIN — IPRATROPIUM BROMIDE 0.5 MG: 0.5 SOLUTION RESPIRATORY (INHALATION) at 18:45

## 2019-01-01 RX ADMIN — CLOPIDOGREL BISULFATE 75 MG: 75 TABLET ORAL at 10:55

## 2019-01-01 RX ADMIN — HYDROXYZINE HYDROCHLORIDE 25 MG: 25 TABLET, FILM COATED ORAL at 22:08

## 2019-01-01 RX ADMIN — IPRATROPIUM BROMIDE 0.5 MG: 0.5 SOLUTION RESPIRATORY (INHALATION) at 06:43

## 2019-01-01 RX ADMIN — ATORVASTATIN CALCIUM 40 MG: 40 TABLET, FILM COATED ORAL at 20:58

## 2019-01-01 RX ADMIN — FORMOTEROL FUMARATE DIHYDRATE 20 MCG: 20 SOLUTION RESPIRATORY (INHALATION) at 22:12

## 2019-01-01 RX ADMIN — DOCUSATE SODIUM 100 MG: 100 CAPSULE, LIQUID FILLED ORAL at 08:18

## 2019-01-01 RX ADMIN — IPRATROPIUM BROMIDE 0.5 MG: 0.5 SOLUTION RESPIRATORY (INHALATION) at 07:02

## 2019-01-01 RX ADMIN — PANTOPRAZOLE SODIUM 40 MG: 40 TABLET, DELAYED RELEASE ORAL at 05:55

## 2019-01-01 RX ADMIN — PANTOPRAZOLE SODIUM 40 MG: 40 TABLET, DELAYED RELEASE ORAL at 07:19

## 2019-01-01 RX ADMIN — Medication 10 ML: at 20:09

## 2019-01-01 RX ADMIN — CLOPIDOGREL BISULFATE 75 MG: 75 TABLET ORAL at 09:36

## 2019-01-01 RX ADMIN — OXYCODONE HYDROCHLORIDE AND ACETAMINOPHEN 1 TABLET: 10; 325 TABLET ORAL at 14:44

## 2019-01-01 RX ADMIN — PREDNISONE 20 MG: 20 TABLET ORAL at 10:54

## 2019-01-01 RX ADMIN — OXYCODONE HYDROCHLORIDE AND ACETAMINOPHEN 1 TABLET: 10; 325 TABLET ORAL at 22:29

## 2019-01-01 RX ADMIN — LACTULOSE 20 G: 20 SOLUTION ORAL at 01:54

## 2019-01-01 RX ADMIN — HYDROCORTISONE 2.5%: 25 CREAM TOPICAL at 20:58

## 2019-01-01 RX ADMIN — IPRATROPIUM BROMIDE AND ALBUTEROL SULFATE 1 AMPULE: .5; 3 SOLUTION RESPIRATORY (INHALATION) at 06:19

## 2019-01-01 RX ADMIN — IOPAMIDOL 90 ML: 755 INJECTION, SOLUTION INTRAVENOUS at 20:21

## 2019-01-01 RX ADMIN — HEPARIN SODIUM 11 UNITS/KG/HR: 10000 INJECTION, SOLUTION INTRAVENOUS at 04:53

## 2019-01-01 RX ADMIN — ASPIRIN 81 MG 81 MG: 81 TABLET ORAL at 08:57

## 2019-01-01 RX ADMIN — IPRATROPIUM BROMIDE 0.5 MG: 0.5 SOLUTION RESPIRATORY (INHALATION) at 14:27

## 2019-01-01 RX ADMIN — PROMETHAZINE HYDROCHLORIDE 6.25 MG: 25 INJECTION INTRAMUSCULAR; INTRAVENOUS at 20:45

## 2019-01-01 RX ADMIN — POLYETHYLENE GLYCOL 3350 17 G: 17 POWDER, FOR SOLUTION ORAL at 22:10

## 2019-01-01 RX ADMIN — ASPIRIN 81 MG: 81 TABLET, COATED ORAL at 08:27

## 2019-01-01 RX ADMIN — HEPARIN SODIUM 5000 UNITS: 5000 INJECTION INTRAVENOUS; SUBCUTANEOUS at 21:31

## 2019-01-01 RX ADMIN — HEPARIN SODIUM 5000 UNITS: 5000 INJECTION INTRAVENOUS; SUBCUTANEOUS at 20:57

## 2019-01-01 RX ADMIN — PREDNISONE 20 MG: 20 TABLET ORAL at 08:49

## 2019-01-01 RX ADMIN — FORMOTEROL FUMARATE DIHYDRATE 20 MCG: 20 SOLUTION RESPIRATORY (INHALATION) at 06:38

## 2019-01-01 RX ADMIN — PANTOPRAZOLE SODIUM 40 MG: 40 TABLET, DELAYED RELEASE ORAL at 07:18

## 2019-01-01 RX ADMIN — PANTOPRAZOLE SODIUM 40 MG: 40 TABLET, DELAYED RELEASE ORAL at 05:18

## 2019-01-01 RX ADMIN — IPRATROPIUM BROMIDE 0.5 MG: 0.5 SOLUTION RESPIRATORY (INHALATION) at 06:44

## 2019-01-01 RX ADMIN — ASPIRIN 81 MG: 81 TABLET, COATED ORAL at 08:47

## 2019-01-01 RX ADMIN — PREDNISONE 20 MG: 20 TABLET ORAL at 08:34

## 2019-01-01 RX ADMIN — Medication 10 ML: at 21:03

## 2019-01-01 RX ADMIN — HEPARIN SODIUM 5000 UNITS: 5000 INJECTION INTRAVENOUS; SUBCUTANEOUS at 21:09

## 2019-01-01 RX ADMIN — HEPARIN SODIUM 5000 UNITS: 5000 INJECTION INTRAVENOUS; SUBCUTANEOUS at 22:33

## 2019-01-01 RX ADMIN — Medication 10 ML: at 20:58

## 2019-01-01 RX ADMIN — ASPIRIN 81 MG: 81 TABLET, COATED ORAL at 09:00

## 2019-01-01 RX ADMIN — ACETAMINOPHEN 650 MG: 325 TABLET ORAL at 17:45

## 2019-01-01 RX ADMIN — IPRATROPIUM BROMIDE AND ALBUTEROL SULFATE 1 AMPULE: .5; 3 SOLUTION RESPIRATORY (INHALATION) at 10:07

## 2019-01-01 RX ADMIN — LACTULOSE 20 G: 20 SOLUTION ORAL at 17:06

## 2019-01-01 RX ADMIN — SENNOSIDES 8.6 MG: 8.6 TABLET, FILM COATED ORAL at 21:09

## 2019-01-01 RX ADMIN — IPRATROPIUM BROMIDE 0.5 MG: 0.5 SOLUTION RESPIRATORY (INHALATION) at 06:39

## 2019-01-01 RX ADMIN — ASPIRIN 81 MG: 81 TABLET, COATED ORAL at 08:17

## 2019-01-01 RX ADMIN — MAGNESIUM HYDROXIDE 30 ML: 400 SUSPENSION ORAL at 17:08

## 2019-01-01 RX ADMIN — IPRATROPIUM BROMIDE 0.5 MG: 0.5 SOLUTION RESPIRATORY (INHALATION) at 18:48

## 2019-01-01 RX ADMIN — AMLODIPINE BESYLATE 5 MG: 5 TABLET ORAL at 08:25

## 2019-01-01 RX ADMIN — FORMOTEROL FUMARATE DIHYDRATE 20 MCG: 20 SOLUTION RESPIRATORY (INHALATION) at 06:36

## 2019-01-01 RX ADMIN — PREDNISONE 20 MG: 20 TABLET ORAL at 08:25

## 2019-01-01 RX ADMIN — FLUOXETINE HYDROCHLORIDE 60 MG: 20 CAPSULE ORAL at 09:00

## 2019-01-01 RX ADMIN — MEROPENEM 1 G: 1 INJECTION, POWDER, FOR SOLUTION INTRAVENOUS at 05:18

## 2019-01-01 RX ADMIN — IPRATROPIUM BROMIDE 0.5 MG: 0.5 SOLUTION RESPIRATORY (INHALATION) at 19:02

## 2019-01-01 RX ADMIN — ACETYLCYSTEINE 600 MG: 200 SOLUTION ORAL; RESPIRATORY (INHALATION) at 18:45

## 2019-01-01 RX ADMIN — SENNOSIDES AND DOCUSATE SODIUM 2 TABLET: 8.6; 5 TABLET ORAL at 08:57

## 2019-01-01 RX ADMIN — IPRATROPIUM BROMIDE AND ALBUTEROL SULFATE 1 AMPULE: .5; 3 SOLUTION RESPIRATORY (INHALATION) at 14:49

## 2019-01-01 RX ADMIN — MEROPENEM 1 G: 1 INJECTION, POWDER, FOR SOLUTION INTRAVENOUS at 17:06

## 2019-01-01 RX ADMIN — CARVEDILOL 3.12 MG: 6.25 TABLET, FILM COATED ORAL at 17:10

## 2019-01-01 RX ADMIN — PREDNISONE 20 MG: 20 TABLET ORAL at 08:57

## 2019-01-01 RX ADMIN — CLOPIDOGREL BISULFATE 75 MG: 75 TABLET ORAL at 09:42

## 2019-01-01 RX ADMIN — CARVEDILOL 3.12 MG: 6.25 TABLET, FILM COATED ORAL at 09:36

## 2019-01-01 RX ADMIN — ONDANSETRON 4 MG: 2 INJECTION INTRAMUSCULAR; INTRAVENOUS at 11:10

## 2019-01-01 RX ADMIN — CEFTRIAXONE 1 G: 1 INJECTION, POWDER, FOR SOLUTION INTRAMUSCULAR; INTRAVENOUS at 17:09

## 2019-01-01 RX ADMIN — SENNOSIDES AND DOCUSATE SODIUM 2 TABLET: 8.6; 5 TABLET ORAL at 17:07

## 2019-01-01 SDOH — HEALTH STABILITY: MENTAL HEALTH: HOW OFTEN DO YOU HAVE A DRINK CONTAINING ALCOHOL?: NOT ASKED

## 2019-01-01 ASSESSMENT — PAIN SCALES - GENERAL
PAINLEVEL_OUTOF10: 0
PAINLEVEL_OUTOF10: 0
PAINLEVEL_OUTOF10: 7
PAINLEVEL_OUTOF10: 5
PAINLEVEL_OUTOF10: 4
PAINLEVEL_OUTOF10: 0
PAINLEVEL_OUTOF10: 0
PAINLEVEL_OUTOF10: 5
PAINLEVEL_OUTOF10: 8
PAINLEVEL_OUTOF10: 7
PAINLEVEL_OUTOF10: 0
PAINLEVEL_OUTOF10: 10
PAINLEVEL_OUTOF10: 6
PAINLEVEL_OUTOF10: 5
PAINLEVEL_OUTOF10: 5
PAINLEVEL_OUTOF10: 0
PAINLEVEL_OUTOF10: 10
PAINLEVEL_OUTOF10: 0
PAINLEVEL_OUTOF10: 5
PAINLEVEL_OUTOF10: 10
PAINLEVEL_OUTOF10: 0
PAINLEVEL_OUTOF10: 10
PAINLEVEL_OUTOF10: 5
PAINLEVEL_OUTOF10: 0
PAINLEVEL_OUTOF10: 10
PAINLEVEL_OUTOF10: 7
PAINLEVEL_OUTOF10: 0
PAINLEVEL_OUTOF10: 10
PAINLEVEL_OUTOF10: 10
PAINLEVEL_OUTOF10: 0
PAINLEVEL_OUTOF10: 5
PAINLEVEL_OUTOF10: 0
PAINLEVEL_OUTOF10: 6
PAINLEVEL_OUTOF10: 0
PAINLEVEL_OUTOF10: 10
PAINLEVEL_OUTOF10: 9
PAINLEVEL_OUTOF10: 10
PAINLEVEL_OUTOF10: 6
PAINLEVEL_OUTOF10: 10
PAINLEVEL_OUTOF10: 10
PAINLEVEL_OUTOF10: 9
PAINLEVEL_OUTOF10: 10
PAINLEVEL_OUTOF10: 0
PAINLEVEL_OUTOF10: 10
PAINLEVEL_OUTOF10: 0
PAINLEVEL_OUTOF10: 0
PAINLEVEL_OUTOF10: 7
PAINLEVEL_OUTOF10: 10
PAINLEVEL_OUTOF10: 0
PAINLEVEL_OUTOF10: 9
PAINLEVEL_OUTOF10: 0
PAINLEVEL_OUTOF10: 7
PAINLEVEL_OUTOF10: 0
PAINLEVEL_OUTOF10: 0
PAINLEVEL_OUTOF10: 10
PAINLEVEL_OUTOF10: 0
PAINLEVEL_OUTOF10: 0
PAINLEVEL_OUTOF10: 10
PAINLEVEL_OUTOF10: 0
PAINLEVEL_OUTOF10: 8
PAINLEVEL_OUTOF10: 0
PAINLEVEL_OUTOF10: 3
PAINLEVEL_OUTOF10: 2
PAINLEVEL_OUTOF10: 8
PAINLEVEL_OUTOF10: 10
PAINLEVEL_OUTOF10: 2
PAINLEVEL_OUTOF10: 8
PAINLEVEL_OUTOF10: 10
PAINLEVEL_OUTOF10: 1
PAINLEVEL_OUTOF10: 10
PAINLEVEL_OUTOF10: 8
PAINLEVEL_OUTOF10: 5
PAINLEVEL_OUTOF10: 5
PAINLEVEL_OUTOF10: 10
PAINLEVEL_OUTOF10: 8
PAINLEVEL_OUTOF10: 3
PAINLEVEL_OUTOF10: 10
PAINLEVEL_OUTOF10: 5
PAINLEVEL_OUTOF10: 0
PAINLEVEL_OUTOF10: 10
PAINLEVEL_OUTOF10: 10
PAINLEVEL_OUTOF10: 0
PAINLEVEL_OUTOF10: 10

## 2019-01-01 ASSESSMENT — ENCOUNTER SYMPTOMS
SHORTNESS OF BREATH: 0
VOMITING: 0
DIARRHEA: 0
ABDOMINAL DISTENTION: 0
CONSTIPATION: 1
CHEST TIGHTNESS: 1
COUGH: 0
GASTROINTESTINAL NEGATIVE: 1
SORE THROAT: 0
NAUSEA: 0
VOMITING: 0
WHEEZING: 0
BACK PAIN: 0
VOMITING: 0
BLOOD IN STOOL: 0
EYE ITCHING: 0
RESPIRATORY NEGATIVE: 1
SHORTNESS OF BREATH: 0
COUGH: 0
SHORTNESS OF BREATH: 0
BACK PAIN: 1
EYE DISCHARGE: 0
EYES NEGATIVE: 1
EYE DISCHARGE: 0
CONSTIPATION: 0
DIARRHEA: 1

## 2019-01-01 ASSESSMENT — PAIN DESCRIPTION - PROGRESSION
CLINICAL_PROGRESSION: GRADUALLY IMPROVING
CLINICAL_PROGRESSION: OTHER (COMMENT)
CLINICAL_PROGRESSION: NOT CHANGED
CLINICAL_PROGRESSION: NOT CHANGED
CLINICAL_PROGRESSION: GRADUALLY IMPROVING
CLINICAL_PROGRESSION: NOT CHANGED
CLINICAL_PROGRESSION: NOT CHANGED

## 2019-01-01 ASSESSMENT — PAIN DESCRIPTION - ONSET
ONSET: SUDDEN
ONSET: ON-GOING
ONSET: ON-GOING
ONSET: GRADUAL
ONSET: ON-GOING

## 2019-01-01 ASSESSMENT — PAIN DESCRIPTION - LOCATION
LOCATION: GENERALIZED
LOCATION: BACK
LOCATION: FOOT;BACK
LOCATION: BACK
LOCATION: BACK;CHEST
LOCATION: BACK
LOCATION: OTHER (COMMENT)
LOCATION: CHEST
LOCATION: BACK
LOCATION: GENERALIZED
LOCATION: BACK
LOCATION: HEAD
LOCATION: BACK
LOCATION: BACK;LEG
LOCATION: BACK
LOCATION: BACK;ABDOMEN

## 2019-01-01 ASSESSMENT — PAIN - FUNCTIONAL ASSESSMENT

## 2019-01-01 ASSESSMENT — PAIN DESCRIPTION - PAIN TYPE
TYPE: CHRONIC PAIN
TYPE: CHRONIC PAIN;ACUTE PAIN
TYPE: CHRONIC PAIN
TYPE: ACUTE PAIN;CHRONIC PAIN
TYPE: CHRONIC PAIN
TYPE: ACUTE PAIN
TYPE: CHRONIC PAIN

## 2019-01-01 ASSESSMENT — PAIN DESCRIPTION - ORIENTATION
ORIENTATION: LOWER
ORIENTATION: MID;LOWER
ORIENTATION: OTHER (COMMENT)
ORIENTATION: MID;LOWER
ORIENTATION: LOWER

## 2019-01-01 ASSESSMENT — PAIN DESCRIPTION - DESCRIPTORS
DESCRIPTORS: CONSTANT;DISCOMFORT;NAGGING
DESCRIPTORS: ACHING
DESCRIPTORS: ACHING;DISCOMFORT;NAGGING
DESCRIPTORS: JABBING
DESCRIPTORS: PATIENT UNABLE TO DESCRIBE
DESCRIPTORS: CONSTANT;DISCOMFORT;JABBING
DESCRIPTORS: ACHING
DESCRIPTORS: ACHING;DISCOMFORT;CRAMPING
DESCRIPTORS: DISCOMFORT;NAGGING;JABBING

## 2019-01-01 ASSESSMENT — PAIN DESCRIPTION - FREQUENCY
FREQUENCY: CONTINUOUS
FREQUENCY: INTERMITTENT
FREQUENCY: CONTINUOUS
FREQUENCY: INTERMITTENT
FREQUENCY: CONTINUOUS
FREQUENCY: INTERMITTENT
FREQUENCY: CONTINUOUS

## 2019-01-01 ASSESSMENT — PULMONARY FUNCTION TESTS
PEFR_L/MIN: 16

## 2019-01-01 ASSESSMENT — PAIN DESCRIPTION - DIRECTION
RADIATING_TOWARDS: LEGS
RADIATING_TOWARDS: NO

## 2019-01-01 ASSESSMENT — PAIN SCALES - WONG BAKER
WONGBAKER_NUMERICALRESPONSE: 0
WONGBAKER_NUMERICALRESPONSE: 10

## 2019-04-05 ENCOUNTER — HOSPITAL ENCOUNTER (OUTPATIENT)
Facility: HOSPITAL | Age: 84
Setting detail: OBSERVATION
Discharge: HOME | End: 2019-04-06
Attending: EMERGENCY MEDICINE | Admitting: FAMILY MEDICINE

## 2019-04-05 ENCOUNTER — APPOINTMENT (OUTPATIENT)
Dept: GENERAL RADIOLOGY | Facility: HOSPITAL | Age: 84
End: 2019-04-05

## 2019-04-05 DIAGNOSIS — I24.9 ACS (ACUTE CORONARY SYNDROME) (HCC): Primary | ICD-10-CM

## 2019-04-05 DIAGNOSIS — J44.9 CHRONIC OBSTRUCTIVE PULMONARY DISEASE, UNSPECIFIED COPD TYPE (HCC): ICD-10-CM

## 2019-04-05 PROBLEM — I10 HYPERTENSION: Status: ACTIVE | Noted: 2019-04-05

## 2019-04-05 PROBLEM — I25.10 CORONARY ARTERY DISEASE: Status: ACTIVE | Noted: 2019-04-05

## 2019-04-05 PROBLEM — R07.2 PRECORDIAL CHEST PAIN: Status: ACTIVE | Noted: 2019-04-05

## 2019-04-05 LAB
ALBUMIN SERPL-MCNC: 3.6 G/DL (ref 3.5–5)
ALBUMIN/GLOB SERPL: 1.1 G/DL (ref 1.1–2.5)
ALP SERPL-CCNC: 82 U/L (ref 24–120)
ALT SERPL W P-5'-P-CCNC: <15 U/L (ref 0–54)
ANION GAP SERPL CALCULATED.3IONS-SCNC: 9 MMOL/L (ref 4–13)
AST SERPL-CCNC: 35 U/L (ref 7–45)
BASOPHILS # BLD AUTO: 0.04 10*3/MM3 (ref 0–0.2)
BASOPHILS NFR BLD AUTO: 0.4 % (ref 0–2)
BILIRUB SERPL-MCNC: 1 MG/DL (ref 0.1–1)
BUN BLD-MCNC: 23 MG/DL (ref 5–21)
BUN/CREAT SERPL: 34.3 (ref 7–25)
CALCIUM SPEC-SCNC: 9.5 MG/DL (ref 8.4–10.4)
CHLORIDE SERPL-SCNC: 100 MMOL/L (ref 98–110)
CO2 SERPL-SCNC: 30 MMOL/L (ref 24–31)
CREAT BLD-MCNC: 0.67 MG/DL (ref 0.5–1.4)
DEPRECATED RDW RBC AUTO: 50.2 FL (ref 40–54)
EOSINOPHIL # BLD AUTO: 0.04 10*3/MM3 (ref 0–0.7)
EOSINOPHIL NFR BLD AUTO: 0.4 % (ref 0–4)
ERYTHROCYTE [DISTWIDTH] IN BLOOD BY AUTOMATED COUNT: 14.4 % (ref 12–15)
GFR SERPL CREATININE-BSD FRML MDRD: 84 ML/MIN/1.73
GLOBULIN UR ELPH-MCNC: 3.2 GM/DL
GLUCOSE BLD-MCNC: 102 MG/DL (ref 70–100)
HCT VFR BLD AUTO: 43.7 % (ref 37–47)
HGB BLD-MCNC: 14.9 G/DL (ref 12–16)
IMM GRANULOCYTES # BLD AUTO: 0.05 10*3/MM3 (ref 0–0.05)
IMM GRANULOCYTES NFR BLD AUTO: 0.5 % (ref 0–5)
LYMPHOCYTES # BLD AUTO: 1.69 10*3/MM3 (ref 0.72–4.86)
LYMPHOCYTES NFR BLD AUTO: 16.6 % (ref 15–45)
MCH RBC QN AUTO: 32.6 PG (ref 28–32)
MCHC RBC AUTO-ENTMCNC: 34.1 G/DL (ref 33–36)
MCV RBC AUTO: 95.6 FL (ref 82–98)
MONOCYTES # BLD AUTO: 0.79 10*3/MM3 (ref 0.19–1.3)
MONOCYTES NFR BLD AUTO: 7.7 % (ref 4–12)
NEUTROPHILS # BLD AUTO: 7.6 10*3/MM3 (ref 1.87–8.4)
NEUTROPHILS NFR BLD AUTO: 74.4 % (ref 39–78)
NRBC BLD AUTO-RTO: 0 /100 WBC (ref 0–0)
PLATELET # BLD AUTO: 200 10*3/MM3 (ref 130–400)
PMV BLD AUTO: 9.4 FL (ref 6–12)
POTASSIUM BLD-SCNC: 3.7 MMOL/L (ref 3.5–5.3)
PROT SERPL-MCNC: 6.8 G/DL (ref 6.3–8.7)
RBC # BLD AUTO: 4.57 10*6/MM3 (ref 4.2–5.4)
SODIUM BLD-SCNC: 139 MMOL/L (ref 135–145)
TROPONIN I SERPL-MCNC: 0.11 NG/ML (ref 0–0.03)
TROPONIN I SERPL-MCNC: 0.11 NG/ML (ref 0–0.03)
TROPONIN I SERPL-MCNC: 0.12 NG/ML (ref 0–0.03)
WBC NRBC COR # BLD: 10.21 10*3/MM3 (ref 4.8–10.8)

## 2019-04-05 PROCEDURE — G0378 HOSPITAL OBSERVATION PER HR: HCPCS

## 2019-04-05 PROCEDURE — 84484 ASSAY OF TROPONIN QUANT: CPT | Performed by: EMERGENCY MEDICINE

## 2019-04-05 PROCEDURE — 85025 COMPLETE CBC W/AUTO DIFF WBC: CPT | Performed by: EMERGENCY MEDICINE

## 2019-04-05 PROCEDURE — 99204 OFFICE O/P NEW MOD 45 MIN: CPT | Performed by: INTERNAL MEDICINE

## 2019-04-05 PROCEDURE — 80053 COMPREHEN METABOLIC PANEL: CPT | Performed by: EMERGENCY MEDICINE

## 2019-04-05 PROCEDURE — 84484 ASSAY OF TROPONIN QUANT: CPT | Performed by: FAMILY MEDICINE

## 2019-04-05 PROCEDURE — 93010 ELECTROCARDIOGRAM REPORT: CPT | Performed by: INTERNAL MEDICINE

## 2019-04-05 PROCEDURE — 99285 EMERGENCY DEPT VISIT HI MDM: CPT

## 2019-04-05 PROCEDURE — 73110 X-RAY EXAM OF WRIST: CPT

## 2019-04-05 PROCEDURE — 71045 X-RAY EXAM CHEST 1 VIEW: CPT

## 2019-04-05 PROCEDURE — 93005 ELECTROCARDIOGRAM TRACING: CPT | Performed by: EMERGENCY MEDICINE

## 2019-04-05 RX ORDER — OXYCODONE HYDROCHLORIDE AND ACETAMINOPHEN 5; 325 MG/1; MG/1
1 TABLET ORAL EVERY 8 HOURS PRN
Status: DISCONTINUED | OUTPATIENT
Start: 2019-04-05 | End: 2019-04-06 | Stop reason: HOSPADM

## 2019-04-05 RX ORDER — OXYCODONE AND ACETAMINOPHEN 10; 325 MG/1; MG/1
1 TABLET ORAL 3 TIMES DAILY
Status: ON HOLD | COMMUNITY
End: 2019-10-01 | Stop reason: SDUPTHER

## 2019-04-05 RX ORDER — CARVEDILOL 25 MG/1
25 TABLET ORAL 2 TIMES DAILY WITH MEALS
Status: DISCONTINUED | OUTPATIENT
Start: 2019-04-05 | End: 2019-04-06 | Stop reason: HOSPADM

## 2019-04-05 RX ORDER — ALPRAZOLAM 0.25 MG/1
0.25 TABLET ORAL AS NEEDED
Status: DISCONTINUED | OUTPATIENT
Start: 2019-04-05 | End: 2019-04-05

## 2019-04-05 RX ORDER — ASPIRIN 81 MG/1
81 TABLET, CHEWABLE ORAL DAILY
Status: DISCONTINUED | OUTPATIENT
Start: 2019-04-05 | End: 2019-04-06 | Stop reason: HOSPADM

## 2019-04-05 RX ORDER — ACETAMINOPHEN 325 MG/1
650 TABLET ORAL EVERY 4 HOURS PRN
Status: DISCONTINUED | OUTPATIENT
Start: 2019-04-05 | End: 2019-04-06 | Stop reason: HOSPADM

## 2019-04-05 RX ORDER — GABAPENTIN 300 MG/1
600 CAPSULE ORAL EVERY 8 HOURS SCHEDULED
Status: DISCONTINUED | OUTPATIENT
Start: 2019-04-05 | End: 2019-04-05

## 2019-04-05 RX ORDER — CYCLOBENZAPRINE HCL 10 MG
10 TABLET ORAL 3 TIMES DAILY PRN
Status: DISCONTINUED | OUTPATIENT
Start: 2019-04-05 | End: 2019-04-05

## 2019-04-05 RX ORDER — ONDANSETRON 4 MG/1
4 TABLET, ORALLY DISINTEGRATING ORAL EVERY 8 HOURS PRN
Status: DISCONTINUED | OUTPATIENT
Start: 2019-04-05 | End: 2019-04-06 | Stop reason: HOSPADM

## 2019-04-05 RX ORDER — SODIUM CHLORIDE 0.9 % (FLUSH) 0.9 %
3 SYRINGE (ML) INJECTION EVERY 12 HOURS SCHEDULED
Status: DISCONTINUED | OUTPATIENT
Start: 2019-04-05 | End: 2019-04-06 | Stop reason: HOSPADM

## 2019-04-05 RX ORDER — FUROSEMIDE 20 MG/1
20 TABLET ORAL
Status: DISCONTINUED | OUTPATIENT
Start: 2019-04-05 | End: 2019-04-05

## 2019-04-05 RX ORDER — ONDANSETRON 2 MG/ML
4 INJECTION INTRAMUSCULAR; INTRAVENOUS EVERY 6 HOURS PRN
Status: DISCONTINUED | OUTPATIENT
Start: 2019-04-05 | End: 2019-04-06 | Stop reason: HOSPADM

## 2019-04-05 RX ORDER — NICOTINE 21 MG/24HR
1 PATCH, TRANSDERMAL 24 HOURS TRANSDERMAL EVERY 24 HOURS
Status: DISCONTINUED | OUTPATIENT
Start: 2019-04-05 | End: 2019-04-06 | Stop reason: HOSPADM

## 2019-04-05 RX ORDER — SODIUM CHLORIDE 0.9 % (FLUSH) 0.9 %
3-10 SYRINGE (ML) INJECTION AS NEEDED
Status: DISCONTINUED | OUTPATIENT
Start: 2019-04-05 | End: 2019-04-06 | Stop reason: HOSPADM

## 2019-04-05 RX ORDER — HYDROCHLOROTHIAZIDE 25 MG/1
12.5 TABLET ORAL DAILY
Status: DISCONTINUED | OUTPATIENT
Start: 2019-04-05 | End: 2019-04-06 | Stop reason: HOSPADM

## 2019-04-05 RX ORDER — ALPRAZOLAM 0.25 MG/1
0.25 TABLET ORAL 3 TIMES DAILY PRN
Status: DISCONTINUED | OUTPATIENT
Start: 2019-04-05 | End: 2019-04-05

## 2019-04-05 RX ORDER — ALUMINA, MAGNESIA, AND SIMETHICONE 2400; 2400; 240 MG/30ML; MG/30ML; MG/30ML
15 SUSPENSION ORAL EVERY 6 HOURS PRN
Status: DISCONTINUED | OUTPATIENT
Start: 2019-04-05 | End: 2019-04-06 | Stop reason: HOSPADM

## 2019-04-05 RX ORDER — LANOLIN ALCOHOL/MO/W.PET/CERES
6 CREAM (GRAM) TOPICAL NIGHTLY PRN
Status: DISCONTINUED | OUTPATIENT
Start: 2019-04-05 | End: 2019-04-06 | Stop reason: HOSPADM

## 2019-04-05 RX ORDER — IPRATROPIUM BROMIDE AND ALBUTEROL SULFATE 2.5; .5 MG/3ML; MG/3ML
3 SOLUTION RESPIRATORY (INHALATION) EVERY 6 HOURS PRN
Status: DISCONTINUED | OUTPATIENT
Start: 2019-04-05 | End: 2019-04-06 | Stop reason: HOSPADM

## 2019-04-05 RX ORDER — BISACODYL 5 MG/1
5 TABLET, DELAYED RELEASE ORAL DAILY PRN
Status: DISCONTINUED | OUTPATIENT
Start: 2019-04-05 | End: 2019-04-06 | Stop reason: HOSPADM

## 2019-04-05 RX ORDER — HYDROCODONE BITARTRATE AND ACETAMINOPHEN 5; 325 MG/1; MG/1
1 TABLET ORAL ONCE
Status: COMPLETED | OUTPATIENT
Start: 2019-04-05 | End: 2019-04-05

## 2019-04-05 RX ADMIN — ALPRAZOLAM 0.25 MG: 0.25 TABLET ORAL at 17:06

## 2019-04-05 RX ADMIN — GABAPENTIN 600 MG: 300 CAPSULE ORAL at 17:06

## 2019-04-05 RX ADMIN — HYDROCODONE BITARTRATE AND ACETAMINOPHEN 1 TABLET: 5; 325 TABLET ORAL at 11:55

## 2019-04-05 RX ADMIN — CARVEDILOL 25 MG: 25 TABLET, FILM COATED ORAL at 17:07

## 2019-04-05 RX ADMIN — FUROSEMIDE 20 MG: 20 TABLET ORAL at 17:11

## 2019-04-05 RX ADMIN — HYDROCHLOROTHIAZIDE 12.5 MG: 25 TABLET ORAL at 17:07

## 2019-04-05 RX ADMIN — SODIUM CHLORIDE, PRESERVATIVE FREE 3 ML: 5 INJECTION INTRAVENOUS at 21:17

## 2019-04-05 RX ADMIN — NICOTINE 1 PATCH: 21 PATCH, EXTENDED RELEASE TRANSDERMAL at 15:44

## 2019-04-05 RX ADMIN — OXYCODONE AND ACETAMINOPHEN 1 TABLET: 5; 325 TABLET ORAL at 17:06

## 2019-04-05 RX ADMIN — ASPIRIN 81 MG 81 MG: 81 TABLET ORAL at 17:06
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
## 2019-04-05 NOTE — CONSULTS
LOS: 0 days   Patient Care Team:  Javi Wilson MD as PCP - General  Javi Wilson MD as PCP - Family Medicine  Javi Wilson MD as PCP - [facility]  Vicente Scanlon MD as Cardiologist (Cardiology)  Javi Wilson MD as Referring Physician (Internal Medicine)    Chief Complaint:   ACS (acute coronary syndrome) (CMS/[facility])    Reason for evaluation: Chest pain and shortness of breath.    History of current illness    Extremely pleasant 83-year-old  lady who was seen in consultation in the emergency room with complaints of cough and shortness of breath in addition she has had some wheezing  Has been feeling poorly  Has had chills  Denies any presyncope syncope  Chest pain occurs both with exertion and also with rest  Chest pain is non-positional  Chest pain is nonpleuritic  Chest pain is non-gustatory nature  One set of cardiac biomarkers show slight elevation of troponin  No acute EKG abnormalities  No significant arrhythmia  Sitting propped up in bed coughing somewhat wheezing and short winded  She looks emaciated    Patient Complaints:   Chief Complaint   Patient presents with   • Arm Pain   • Hypertension       Telemetry: no malignant arrhythmia. No significant pauses.    Review of Systems     Constitutional: Feels tired week as well has had low-grade fever and some chills  HENT: Negative.    Eyes: Negative.      Respiratory: Cough and shortness of breath.      Cardiovascular: Precordial chest pain and shortness of breath    Gastrointestinal: Negative for abdominal distention,  No abdominal pain,   No blood in stool,   No constipation,   No diarrhea,   No nausea   No vomiting.     Endocrine: Negative.    Genitourinary: Negative for difficulty urinating, dysuria, flank pain and hematuria.     Musculoskeletal: Negative.    Skin: Negative for rash and wound.   Allergic/Immunologic: Negative.      Neurological: Negative for dizziness, syncope, weakness,   No light-headedness  No  headaches.      Hematological: Does not bruise/bleed easily.     Psychiatric/Behavioral: Negative for agitation or behavioral problems,   No confusion,   the patient is  nervous/anxious.       History:   Past Medical History:   Diagnosis Date   • Arthritis    • COPD (chronic obstructive pulmonary disease) (CMS/HCC)    • Coronary artery disease    • Foot pain, bilateral    • Hypertension    • Kidney cysts    • Myocardial infarction (CMS/HCC)    • Tobacco abuse      Past Surgical History:   Procedure Laterality Date   • CORONARY ANGIOPLASTY WITH STENT PLACEMENT     • KNEE SURGERY Right      Social History     Socioeconomic History   • Marital status:      Spouse name: Not on file   • Number of children: Not on file   • Years of education: Not on file   • Highest education level: Not on file   Tobacco Use   • Smoking status: Current Every Day Smoker     Packs/day: 1.00   • Smokeless tobacco: Never Used   Substance and Sexual Activity   • Alcohol use: No   • Drug use: No   • Sexual activity: Defer     Family History   Problem Relation Age of Onset   • Heart disease Other    • Hypertension Other        Labs:  WBC WBC   Date Value Ref Range Status   04/05/2019 10.21 4.80 - 10.80 10*3/mm3 Final      HGB Hemoglobin   Date Value Ref Range Status   04/05/2019 14.9 12.0 - 16.0 g/dL Final      HCT Hematocrit   Date Value Ref Range Status   04/05/2019 43.7 37.0 - 47.0 % Final      Platelets Platelets   Date Value Ref Range Status   04/05/2019 200 130 - 400 10*3/mm3 Final      MCV MCV   Date Value Ref Range Status   04/05/2019 95.6 82.0 - 98.0 fL Final        Results from last 7 days   Lab Units 04/05/19  1158   SODIUM mmol/L 139   POTASSIUM mmol/L 3.7   CHLORIDE mmol/L 100   CO2 mmol/L 30.0   BUN mg/dL 23*   CREATININE mg/dL 0.67   CALCIUM mg/dL 9.5   BILIRUBIN mg/dL 1.0   ALK PHOS U/L 82   ALT (SGPT) U/L <15   AST (SGOT) U/L 35   GLUCOSE mg/dL 102*     Lab Results   Component Value Date    TROPONINI 0.108 (H) 04/05/2019     PT/INR:   No results found for: PROTIME/No results found for: INR    Imaging Results (last 72 hours)     Procedure Component Value Units Date/Time    XR Chest 1 View [[id number]] Collected:  04/05/19 1157     Updated:  04/05/19 1201    Narrative:       EXAMINATION: XR CHEST 1 VW-     4/5/2019 11:41 AM CDT     HISTORY: Hypertension.     Magnified heart size. Probable borderline enlargement.  Aortic arch calcification.     Chronic interstitial lung changes with a few granulomas.     No infiltrate or effusion.     Summary:  1. Stable chronic lung changes with no acute disease.     This report was finalized on 04/05/2019 11:58 by Dr. Kieran Dumont MD.    XR Wrist 3+ View Left [[id number]] Collected:  04/05/19 1156     Updated:  04/05/19 1159    Narrative:       EXAMINATION: XR WRIST 3+ VW LEFT-     4/5/2019 11:42 AM CDT     HISTORY: pain.     Left wrist, 3 views.     Osteopenia.     High-grade degenerative joint change at the first carpal metacarpal  junction. Spurring and sclerosis at the base of the first metacarpal.     The distal radius and ulna are intact.     Carpal bones align normally.     Intact metacarpals.     Summary:  1. High-grade osteoarthritic change at the first carpal metacarpal  joint.  2. No acute bony abnormality is seen.  This report was finalized on 04/05/2019 11:56 by Dr. Kieran Dumont MD.          Objective     Allergies   Allergen Reactions   • Lisinopril    • Penicillins        Medication Review: Performed  No current facility-administered medications for this encounter.      Current Outpatient Medications   Medication Sig Dispense Refill   • ALPRAZolam (XANAX) 0.25 MG tablet Take 0.25 mg by mouth As Needed for Anxiety.     • carvedilol (COREG) 25 MG tablet Take 25 mg by mouth 2 (Two) Times a Day With Meals.     • cyclobenzaprine (FLEXERIL) 10 MG tablet Take 10 mg by mouth 3 (Three) Times a Day As Needed for Muscle Spasms.     • furosemide (LASIX) 20 MG tablet Take 20 mg by mouth 2 (Two) Times a Day.    "  • gabapentin (NEURONTIN) 600 MG tablet Take 600 mg by mouth 3 (Three) Times a Day.     • hydrochlorothiazide (HYDRODIURIL) 12.5 MG tablet Take 12.5 mg by mouth Daily.     • ipratropium-albuterol (COMBIVENT RESPIMAT)  MCG/ACT inhaler Inhale 1 puff.     • ondansetron ODT (ZOFRAN-ODT) 4 MG disintegrating tablet Take 1 tablet by mouth Every 8 (Eight) Hours As Needed for Nausea or Vomiting. 9 tablet 0       Vital Sign Min/Max for last 24 hours  Temp  Min: 98.2 °F (36.8 °C)  Max: 98.2 °F (36.8 °C)   BP  Min: 141/78  Max: 188/86   Pulse  Min: 72  Max: 88   Resp  Min: 18  Max: 18   SpO2  Min: 90 %  Max: 95 %   No Data Recorded   Weight  Min: 45.4 kg (100 lb)  Max: 45.4 kg (100 lb)     Flowsheet Rows      First Filed Value   Admission Height  152.4 cm (60\") Documented at 04/05/2019 1035   Admission Weight  45.4 kg (100 lb) Documented at 04/05/2019 1035          Results for orders placed in visit on 01/04/13   SCANNED - ECHOCARDIOGRAM       Physical Exam:    General Appearance: Awake, alert, in no acute distress  Eyes: Pupils equal and reactive    Ears: Appear intact with no abnormalities noted  Nose: Nares normal, no drainage  Neck: supple, trachea midline, no carotid bruit and no JVD  Back: no kyphosis present,    Lungs: respirations regular, respirations even and respirations unlabored  Prolonged expiration  Expiratory wheezing    Heart: normal S1, S2,  2/6 pansystolic murmur in the left sternal border,  no rub and no click    Abdomen: normal bowel sounds, no tenderness   Skin: no bleeding, bruising or rash  Extremities: no cyanosis  Psychiatric/Behavioral: Negative for agitation, behavioral problems, confusion, the patient does  appear to be nervous/anxious.          Results Review:   I reviewed the patient's new clinical results.  I reviewed the patient's new imaging results and agree with the interpretation.  I reviewed the patient's other test results and agree with the interpretation  I personally viewed and " interpreted the patient's EKG/Telemetry data  Discussed with patient, ER physician, family member by bedside    Reviewed available prior notes, consults, prior visits, laboratory findings, radiology and cardiology relevant reports. Updated chart as applicable. I have reviewed the patient's medical history in detail and updated the computerized patient record as relevant.      Updated patient regarding any new or relevant abnormalities on review of records or any new findings on physical exam. Mentioned to patient about purpose of visit and desirable health short and long term goals and objectives.     Assessment/Plan       ACS (acute coronary syndrome) (CMS/[facility])  More than 70 pack years of tobacco use  Weight loss  Significant COPD  Coronary artery disease  Prior stent placement  Moderate chronic shortness of breath    Plan    Serial cardiac enzymes  Check echocardiogram  Conservative medical therapy      Risks, benefits and alternatives explained. The patient understands and wishes to proceed with only conservative therapy.  The patient expressively declined any invasive testing or treatment for the time being    Continues to use tobacco and needs to work on this  For other medical issues will be admitted to internal medicine service  Aspirin 81 mg p.o. Daily    Can see her back in my office approximately 8 weeks after discharge  In the past she has not kept follow-up appointments    Supportive care  Telemetry  Optimal medical therapy    Deep vein thrombosis prophylaxis/precautions  Appropriate diet, fluid, sodium, caffeine, stimulants intake     Questions were encouraged, asked and answered to the patient's  understanding and satisfaction.    Compliance to diet and medications   Avoid NSAIDS    Vicente Scanlon MD  04/05/19  2:42 PM    EMR Dragon/Transcription was used to dictate part of this note

## 2019-04-05 NOTE — ED PROVIDER NOTES
Subjective   And complaining of pain all over with some left wrist pain which she thinks is because of her broken wrist she is out of her pain medicine        Arm Pain   Severity:  Moderate  Onset quality:  Gradual  Timing:  Constant  Progression:  Worsening  Chronicity:  New  Associated symptoms: myalgias    Associated symptoms: no abdominal pain, no chest pain, no congestion, no cough, no diarrhea, no ear pain, no fatigue, no fever, no headaches, no nausea, no rash, no rhinorrhea, no shortness of breath, no sore throat, no vomiting and no wheezing    Hypertension   Associated symptoms: no abdominal pain, no chest pain, no dizziness, no ear pain, no fatigue, no fever, no headaches, no nausea, no shortness of breath, not vomiting and no weakness        Review of Systems   Constitutional: Negative.  Negative for activity change, appetite change, chills, diaphoresis, fatigue and fever.   HENT: Negative for congestion, drooling, ear pain, facial swelling, hearing loss, rhinorrhea, sinus pressure and sore throat.    Eyes: Negative.  Negative for discharge.   Respiratory: Negative for cough, shortness of breath and wheezing.    Cardiovascular: Negative for chest pain.   Gastrointestinal: Negative for abdominal distention, abdominal pain, blood in stool, diarrhea, nausea and vomiting.   Endocrine: Negative.  Negative for cold intolerance, heat intolerance, polydipsia, polyphagia and polyuria.   Genitourinary: Negative.  Negative for dysuria, flank pain and urgency.   Musculoskeletal: Positive for myalgias. Negative for arthralgias, back pain and neck stiffness.   Skin: Negative.  Negative for color change, pallor and rash.   Allergic/Immunologic: Negative.    Neurological: Negative.  Negative for dizziness, seizures, speech difficulty, weakness, numbness and headaches.   Hematological: Negative.  Negative for adenopathy.   All other systems reviewed and are negative.      Past Medical History:   Diagnosis Date   •  Arthritis    • COPD (chronic obstructive pulmonary disease) (CMS/HCC)    • Coronary artery disease    • Foot pain, bilateral    • Hypertension    • Kidney cysts    • Myocardial infarction (CMS/HCC)    • Tobacco abuse        Allergies   Allergen Reactions   • Lisinopril    • Penicillins        Past Surgical History:   Procedure Laterality Date   • CORONARY ANGIOPLASTY WITH STENT PLACEMENT     • KNEE SURGERY Right        Family History   Problem Relation Age of Onset   • Heart disease Other    • Hypertension Other        Social History     Socioeconomic History   • Marital status:      Spouse name: Not on file   • Number of children: Not on file   • Years of education: Not on file   • Highest education level: Not on file   Tobacco Use   • Smoking status: Current Every Day Smoker     Packs/day: 1.00   • Smokeless tobacco: Never Used   Substance and Sexual Activity   • Alcohol use: No   • Drug use: No   • Sexual activity: Defer           Objective   Physical Exam   Constitutional: She is oriented to person, place, and time. She appears well-developed and well-nourished.   HENT:   Head: Normocephalic.   Right Ear: External ear normal.   Eyes: Conjunctivae are normal. Pupils are equal, round, and reactive to light.   Neck: Normal range of motion. Neck supple.   Cardiovascular: Normal rate, regular rhythm, normal heart sounds and intact distal pulses. PMI is not displaced. Exam reveals no decreased pulses.   No murmur heard.  Pulmonary/Chest: Effort normal and breath sounds normal. No accessory muscle usage. No tachypnea. No respiratory distress. She has no decreased breath sounds. She has no wheezes. She has no rales. She exhibits no tenderness.   Abdominal: Soft. Bowel sounds are normal. There is no tenderness.   Musculoskeletal: Normal range of motion. She exhibits no edema or tenderness.   Lower extremity exam bilaterally is unremarkable.  There is no right or left calf tenderness .  There is no palpable venous  cord.  No obvious difference in the size of the legs.  No pitting edema.  The dorsalis pedis and posterior tibial femoral and popliteal pulses are palpable and +2 bilaterally.  Homans sign is negative  Left wrist is tender no swelling no deformity   Neurological: She is alert and oriented to person, place, and time. She has normal reflexes. No cranial nerve deficit. Coordination normal.   Skin: Skin is warm. No rash noted. No erythema.   Nursing note and vitals reviewed.      Procedures           ED Course  ED Course as of Apr 05 1355 Fri Apr 05, 2019   1250 Difficult historian cardiac markers elevated cardiology has been consulted  [TS]   1352 Cardiologist saw the patient the patient does not want any intervention will be placed in the hospitalist observation  [TS]      ED Course User Index  [TS] Elias Oshea MD                  [facility]      Final diagnoses:   ACS (acute coronary syndrome) (CMS/[facility])            Elias Oshea MD  04/05/19 1251       Elias Oshea MD  04/05/19 1355

## 2019-04-05 NOTE — PLAN OF CARE
Problem: Cardiac: ACS (Acute Coronary Syndrome) (Adult)  Goal: Signs and Symptoms of Listed Potential Problems Will be Absent, Minimized or Managed (Cardiac: ACS)  Outcome: Ongoing (interventions implemented as appropriate)      Problem: Patient Care Overview  Goal: Plan of Care Review  Outcome: Ongoing (interventions implemented as appropriate)   04/05/19 6842   Coping/Psychosocial   Plan of Care Reviewed With patient   Plan of Care Review   Progress improving   OTHER   Outcome Summary Patient did not c/o chest pain, but c/o generalized pain and L wrist pain. Given pain med per order. Patient refused of any invasive tx, So Dr. Ware ordered echo and maybe will send her home tomorrow. VSS. Cont to monitor pt.      Goal: Individualization and Mutuality  Outcome: Ongoing (interventions implemented as appropriate)      Problem: Fall Risk (Adult)  Goal: Identify Related Risk Factors and Signs and Symptoms  Outcome: Outcome(s) achieved Date Met: 04/05/19    Goal: Absence of Fall  Outcome: Ongoing (interventions implemented as appropriate)
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
## 2019-04-06 VITALS
HEART RATE: 65 BPM | RESPIRATION RATE: 17 BRPM | SYSTOLIC BLOOD PRESSURE: 107 MMHG | WEIGHT: 100 LBS | HEIGHT: 60 IN | DIASTOLIC BLOOD PRESSURE: 46 MMHG | OXYGEN SATURATION: 98 % | TEMPERATURE: 98.2 F | BODY MASS INDEX: 19.63 KG/M2

## 2019-04-06 PROBLEM — R77.8 ELEVATED TROPONIN: Status: ACTIVE | Noted: 2019-04-06

## 2019-04-06 PROBLEM — R52 GENERALIZED PAIN: Status: ACTIVE | Noted: 2019-04-06

## 2019-04-06 LAB
ANION GAP SERPL CALCULATED.3IONS-SCNC: 6 MMOL/L (ref 4–13)
BASOPHILS # BLD AUTO: 0.04 10*3/MM3 (ref 0–0.2)
BASOPHILS NFR BLD AUTO: 0.5 % (ref 0–2)
BUN BLD-MCNC: 30 MG/DL (ref 5–21)
BUN/CREAT SERPL: 35.3 (ref 7–25)
CALCIUM SPEC-SCNC: 9.1 MG/DL (ref 8.4–10.4)
CHLORIDE SERPL-SCNC: 98 MMOL/L (ref 98–110)
CO2 SERPL-SCNC: 34 MMOL/L (ref 24–31)
CREAT BLD-MCNC: 0.85 MG/DL (ref 0.5–1.4)
DEPRECATED RDW RBC AUTO: 52.5 FL (ref 40–54)
EOSINOPHIL # BLD AUTO: 0.17 10*3/MM3 (ref 0–0.7)
EOSINOPHIL NFR BLD AUTO: 2.3 % (ref 0–4)
ERYTHROCYTE [DISTWIDTH] IN BLOOD BY AUTOMATED COUNT: 14.6 % (ref 12–15)
GFR SERPL CREATININE-BSD FRML MDRD: 64 ML/MIN/1.73
GLUCOSE BLD-MCNC: 98 MG/DL (ref 70–100)
HCT VFR BLD AUTO: 41.3 % (ref 37–47)
HGB BLD-MCNC: 13.7 G/DL (ref 12–16)
IMM GRANULOCYTES # BLD AUTO: 0.02 10*3/MM3 (ref 0–0.05)
IMM GRANULOCYTES NFR BLD AUTO: 0.3 % (ref 0–5)
LYMPHOCYTES # BLD AUTO: 2.35 10*3/MM3 (ref 0.72–4.86)
LYMPHOCYTES NFR BLD AUTO: 32.2 % (ref 15–45)
MCH RBC QN AUTO: 32.3 PG (ref 28–32)
MCHC RBC AUTO-ENTMCNC: 33.2 G/DL (ref 33–36)
MCV RBC AUTO: 97.4 FL (ref 82–98)
MONOCYTES # BLD AUTO: 0.82 10*3/MM3 (ref 0.19–1.3)
MONOCYTES NFR BLD AUTO: 11.2 % (ref 4–12)
NEUTROPHILS # BLD AUTO: 3.89 10*3/MM3 (ref 1.87–8.4)
NEUTROPHILS NFR BLD AUTO: 53.5 % (ref 39–78)
NRBC BLD AUTO-RTO: 0 /100 WBC (ref 0–0)
PLATELET # BLD AUTO: 208 10*3/MM3 (ref 130–400)
PMV BLD AUTO: 9.9 FL (ref 6–12)
POTASSIUM BLD-SCNC: 4.2 MMOL/L (ref 3.5–5.3)
RBC # BLD AUTO: 4.24 10*6/MM3 (ref 4.2–5.4)
SODIUM BLD-SCNC: 138 MMOL/L (ref 135–145)
TROPONIN I SERPL-MCNC: 0.12 NG/ML (ref 0–0.03)
WBC NRBC COR # BLD: 7.29 10*3/MM3 (ref 4.8–10.8)

## 2019-04-06 PROCEDURE — 94799 UNLISTED PULMONARY SVC/PX: CPT

## 2019-04-06 PROCEDURE — 94760 N-INVAS EAR/PLS OXIMETRY 1: CPT

## 2019-04-06 PROCEDURE — 80048 BASIC METABOLIC PNL TOTAL CA: CPT | Performed by: FAMILY MEDICINE

## 2019-04-06 PROCEDURE — 84484 ASSAY OF TROPONIN QUANT: CPT | Performed by: FAMILY MEDICINE

## 2019-04-06 PROCEDURE — 94618 PULMONARY STRESS TESTING: CPT

## 2019-04-06 PROCEDURE — G0378 HOSPITAL OBSERVATION PER HR: HCPCS

## 2019-04-06 PROCEDURE — 85025 COMPLETE CBC W/AUTO DIFF WBC: CPT | Performed by: FAMILY MEDICINE

## 2019-04-06 RX ORDER — ASPIRIN 81 MG/1
81 TABLET, CHEWABLE ORAL DAILY
Qty: 100 TABLET | Refills: 99 | Status: SHIPPED | OUTPATIENT
Start: 2019-04-07

## 2019-04-06 RX ORDER — NITROGLYCERIN 0.1MG/HR
1 PATCH, TRANSDERMAL 24 HOURS TRANSDERMAL DAILY
Status: DISCONTINUED | OUTPATIENT
Start: 2019-04-06 | End: 2019-04-06

## 2019-04-06 RX ADMIN — ACETAMINOPHEN 650 MG: 325 TABLET, FILM COATED ORAL at 14:50

## 2019-04-06 RX ADMIN — OXYCODONE AND ACETAMINOPHEN 1 TABLET: 5; 325 TABLET ORAL at 09:19

## 2019-04-06 RX ADMIN — IPRATROPIUM BROMIDE AND ALBUTEROL SULFATE 3 ML: 2.5; .5 SOLUTION RESPIRATORY (INHALATION) at 08:33

## 2019-04-06 RX ADMIN — SODIUM CHLORIDE, PRESERVATIVE FREE 3 ML: 5 INJECTION INTRAVENOUS at 09:20

## 2019-04-06 RX ADMIN — ASPIRIN 81 MG 81 MG: 81 TABLET ORAL at 09:19

## 2019-04-06 RX ADMIN — IPRATROPIUM BROMIDE AND ALBUTEROL SULFATE 3 ML: 2.5; .5 SOLUTION RESPIRATORY (INHALATION) at 00:16

## 2019-04-06 RX ADMIN — HYDROCHLOROTHIAZIDE 12.5 MG: 25 TABLET ORAL at 09:19

## 2019-04-06 RX ADMIN — CARVEDILOL 25 MG: 25 TABLET, FILM COATED ORAL at 09:19
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
## 2019-04-06 NOTE — DISCHARGE PLACEMENT REQUEST
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 WRIST 3+ VW LEFT-     4/5/2019 11:42 AM CDT     HISTORY: pain.     Left wrist, 3 views.     Osteopenia.     High-grade degenerative joint change at the first carpal metacarpal  junction. Spurring and sclerosis at the base of the first metacarpal.     The distal radius and ulna are intact.     Carpal bones align normally.     Intact metacarpals.     Summary:  1. High-grade osteoarthritic change at the first carpal metacarpal  joint.  2. No acute bony abnormality is seen.  This report was finalized on 04/05/2019 11:56 by Dr. Kieran Dumont MD.             Condition on Discharge:    Stable     Physical Exam on Discharge:  /46 (BP Location: Right arm, Patient Position: Lying)   Pulse 65   Temp 98.2 °F (36.8 °C) (Oral)   Resp 17   Ht 152.4 cm (60\")   Wt 45.4 kg (100 lb)   SpO2 98%   BMI 19.53 kg/m²   Physical Exam  Constitutional: She is oriented to person, place, and time. She appears well-developed. She is cooperative.   Frail, thin.   HENT:   Head: Normocephalic and atraumatic.   Right Ear: External ear normal.   Nose: Nose normal.   Mouth/Throat: Oropharynx is clear and moist. "   Eyes: Conjunctivae and EOM are normal. Pupils are equal, round, and reactive to light.   Neck: Normal range of motion. Neck supple. No JVD present. No tracheal deviation present.   Cardiovascular: Normal rate, regular rhythm and normal heart sounds.   No murmur heard.  Pulmonary/Chest: Effort normal. No tachypnea. No respiratory distress. She has wheezes (occasional).  Abdominal: Soft. Bowel sounds are normal. She exhibits no distension and no mass. There is no tenderness.   Musculoskeletal: Normal range of motion. She exhibits no edema or tenderness.   Neurological: She is alert and oriented to person, place, and time. She displays normal reflexes. No cranial nerve deficit. Coordination normal.   Skin: Skin is warm and dry. No rash noted.   Psychiatric: She has a normal mood and affect. Her behavior is normal. Judgment and thought content normal.            Discharge Disposition:  Home or Self Care     Discharge Medications:      Discharge Medications            New Medications      Instructions Start Date   aspirin 81 MG chewable tablet    81 mg, Oral, Daily    Start Date:  4/7/2019                    Continue These Medications      Instructions Start Date   ALPRAZolam 0.25 MG tablet  Commonly known as:  XANAX    0.25 mg, Oral, 3 Times Daily PRN        carvedilol 25 MG tablet  Commonly known as:  COREG    25 mg, Oral, 2 Times Daily With Meals        gabapentin 600 MG tablet  Commonly known as:  NEURONTIN    600 mg, Oral, 3 Times Daily        hydrochlorothiazide 12.5 MG tablet  Commonly known as:  HYDRODIURIL    12.5 mg, Oral, Daily        ipratropium-albuterol  MCG/ACT inhaler  Commonly known as:  COMBIVENT RESPIMAT    1 puff, Inhalation, 4 Times Daily - RT        oxyCODONE-acetaminophen  MG per tablet  Commonly known as:  PERCOCET    1 tablet, Oral, 3 Times Daily                  Discharge Diet:       Diet Instructions      Diet: Regular; Thin       Discharge Diet:  Regular     Fluid Consistency:   "Thin             Discharge Care Plan / Instructions:   Discharge home     Activity at Discharge:       Activity Instructions      Activity as Tolerated               Follow-up Appointments:  Follow-up with family physician next week     Jack Ware DO  19  12:29 PM     Time: Discharge Less than 30 min     Please note that portions of this note may have been completed with a voice recognition program. Efforts were made to edit the dictations, but occasionally words are mistranscribed.                            Routing History        [address] [address]  Dept. Phone:  [phone]  Dept. Fax:   Date Ordered: 2019         Patient:  Isha Mccall MRN:  [id number]   [address] [facility] [address] :  1935  SSN:    Phone: [phone] Sex:  F     Weight: 45.4 kg (100 lb)         Ht Readings from Last 1 Encounters:   19 152.4 cm (60\")         Oxygen Therapy         (Order ID: [id number])    Diagnosis:  Chronic obstructive pulmonary disease, unspecified COPD type (CMS/[facility]) (J44.9 [ICD-10-CM] 496 [ICD-9-CM])   Quantity:  1     Delivery Modality: Nasal Cannula  Liters Per Minute: 2  Duration: Continuous  Equipment:  Oxygen Concentrator &  &  Portable Gaseous Oxygen System & Portable Oxygen Contents Gaseous &  Conserving Regulator  Length of Need (99 Months = Lifetime): 99 Months = Lifetime        Authorizing Provider's Phone: [phone]   Verbal Order Mode: Telephone with readback   Authorizing Provider: Jack Ware DO  Authorizing Provider's NPI: [id number]     Order Entered By: Mercedez Vasquez RN 2019  2:21 PM     Electronically signed by:          Sydney Soliman, RRT   Respiratory Therapist   Respiratory Therapy   Progress Notes   Signed   Date of Service:  2019  1:45 PM   Creation Time:  2019  1:45 PM            Signed                 Show:Clear " all  [x]Manual[x]Template[]Copied    Added by:  [x]Sydney Soliman, RRT      []Jared for details      Exercise Oximetry     Patient Name:Isha Mccall   MRN: [id number]   Date: 04/06/19                                                                                                     ROOM AIR BASELINE   SpO2% 87   Heart Rate 62   Blood Pressure       EXERCISE ON ROOM AIR SpO2% EXERCISE ON O2 @ 2  LPM SpO2%   1 MINUTE   1 MINUTE  94   2 MINUTES   2 MINUTES  97   3 MINUTES   3 MINUTES  100   4 MINUTES   4 MINUTES  98   5 MINUTES   5 MINUTES  95   6 MINUTES   6 MINUTES                                                                                                                   Distance Walked   Distance Walked   Dyspnea (Faina Scale) Dyspnea (Faina Scale) 2   Fatigue (Faina Scale)   Fatigue (Faina Scale) 2   SpO2% Post Exercise   SpO2% Post Exercise 95   HR Post Exercise   HR Post Exercise 61   Time to Recovery   Time to Recovery       Comments: After being off of oxygen for  5 minutes the patients oxygen saturation dropped to 87% with a heart rate of 62. Patient was placed on 2 lpm nasal cannula and after a 5 minute recovery her oxygen saturation was 94%. Patient is not able to walk with me so we did exercised at the bedside. Patient exercised for 5 minutes on 2 lpm Nasal cannula and her oxygen saturation was between 94% and 100%.

## 2019-04-06 NOTE — PLAN OF CARE
Problem: Cardiac: ACS (Acute Coronary Syndrome) (Adult)  Goal: Signs and Symptoms of Listed Potential Problems Will be Absent, Minimized or Managed (Cardiac: ACS)  Outcome: Ongoing (interventions implemented as appropriate)      Problem: Patient Care Overview  Goal: Plan of Care Review  Outcome: Ongoing (interventions implemented as appropriate)   04/06/19 0350   Coping/Psychosocial   Plan of Care Reviewed With patient   Plan of Care Review   Progress no change   OTHER   Outcome Summary vss; alert and oriented x 4; no c/o pain; no chest pain noted; no sob noted; O2 at 2L NC; echo in am; continue to monitor     Goal: Individualization and Mutuality  Outcome: Ongoing (interventions implemented as appropriate)    Goal: Discharge Needs Assessment  Outcome: Ongoing (interventions implemented as appropriate)    Goal: Interprofessional Rounds/Family Conf  Outcome: Ongoing (interventions implemented as appropriate)      Problem: Fall Risk (Adult)  Goal: Absence of Fall  Outcome: Ongoing (interventions implemented as appropriate)
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
## 2019-04-06 NOTE — PROGRESS NOTES
Exercise Oximetry    Patient Name:Isha Mccall   MRN: [id number]   Date: 04/06/19             ROOM AIR BASELINE   SpO2% 87   Heart Rate 62   Blood Pressure      EXERCISE ON ROOM AIR SpO2% EXERCISE ON O2 @ 2  LPM SpO2%   1 MINUTE   1 MINUTE  94   2 MINUTES  2 MINUTES  97   3 MINUTES   3 MINUTES  100   4 MINUTES   4 MINUTES  98   5 MINUTES   5 MINUTES  95   6 MINUTES  6 MINUTES               Distance Walked   Distance Walked   Dyspnea (Faina Scale) Dyspnea (Faina Scale) 2   Fatigue (Faina Scale)   Fatigue (Faina Scale) 2   SpO2% Post Exercise   SpO2% Post Exercise 95   HR Post Exercise   HR Post Exercise 61   Time to Recovery   Time to Recovery      Comments: After being off of oxygen for  5 minutes the patients oxygen saturation dropped to 87% with a heart rate of 62. Patient was placed on 2 lpm nasal cannula and after a 5 minute recovery her oxygen saturation was 94%. Patient is not able to walk with me so we did exercised at the bedside. Patient exercised for 5 minutes on 2 lpm Nasal cannula and her oxygen saturation was between 94% and 100%.

## 2019-04-09 ENCOUNTER — TRANSCRIBE ORDERS (OUTPATIENT)
Dept: ADMINISTRATIVE | Facility: HOSPITAL | Age: 84
End: 2019-04-09

## 2019-04-09 DIAGNOSIS — J44.9 CHRONIC OBSTRUCTIVE PULMONARY DISEASE, UNSPECIFIED COPD TYPE (HCC): Primary | ICD-10-CM

## 2019-04-09 DIAGNOSIS — I25.10 MILD CAD: ICD-10-CM

## 2019-04-10 ENCOUNTER — APPOINTMENT (OUTPATIENT)
Dept: CARDIOLOGY | Facility: HOSPITAL | Age: 84
End: 2019-04-10

## 2019-04-19 ENCOUNTER — APPOINTMENT (OUTPATIENT)
Dept: CARDIOLOGY | Facility: HOSPITAL | Age: 84
End: 2019-04-19

## 2019-07-08 PROBLEM — K21.9 GASTROESOPHAGEAL REFLUX DISEASE WITHOUT ESOPHAGITIS: Status: ACTIVE | Noted: 2019-07-08

## 2019-07-08 PROBLEM — Z72.0 TOBACCO USER: Status: ACTIVE | Noted: 2019-07-08

## 2019-07-08 PROBLEM — J84.10 CALCIFIED GRANULOMA OF LUNG (HCC): Status: ACTIVE | Noted: 2019-07-08

## 2019-07-08 PROBLEM — R06.02 SHORTNESS OF BREATH: Status: ACTIVE | Noted: 2019-07-08

## 2019-09-03 PROBLEM — G47.34 NOCTURNAL HYPOXIA: Status: ACTIVE | Noted: 2019-09-03

## 2019-09-03 PROBLEM — Z87.891 PERSONAL HISTORY OF NICOTINE DEPENDENCE: Status: ACTIVE | Noted: 2019-09-03

## 2019-09-03 NOTE — PROGRESS NOTES
BOZENA Christine  [facility]   Res[facility] Disease Clinic  [address] [place] [address]  Phone: [phone]  Fax: [phone]     Isha Mccall is a 84 y.o. female.   CC:   Chief Complaint   Patient presents with   • COPD        HPI: Mrs. Mccall is coming in as a new patient referral for shortness of breath.  She reports moderate persistent shortness of breath daily which is worsened with activity and improved with rest and bronchodilators.  She has a reported history of COPD as well as asthma.  She is a current smoker.  Records review indicates she is on supplemental oxygen with sleep.  She is compliant with this and does benefit from it.  She utilizes Combivent and DuoNeb's as her bronchodilators.  She reports using it more frequently than prescribed.  Symptoms are complicated by underlying issues with allergies as well as reflux and heart disease.  She is also on Singulair and Flonase for allergies.  She is on Prilosec for reflux.  She is on Coreg for her heart disease.  Recent admission to the hospital indicates they offered a heart catheterization however she refused.  I have not seen an echocardiogram.  She is requesting refills on a couple of her other medications until she can get into see her primary care provider.    The following portions of the patient's history were reviewed and updated as appropriate: allergies, current medications, past family history, past medical history, past social history, past surgical history and problem list.    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   • COPD (chronic obstructive pulmonary disease) (CMS/[facility])    • Coronary artery disease    • Foot pain, bilateral    • Gastroesophageal reflux disease without esophagitis 7/8/2019   • Hypertension    • Kidney cysts    • Myocardial infarction (CMS/[facility])    • Nocturnal hypoxia 9/3/2019   • Personal history of nicotine dependence  9/3/2019   • Tobacco abuse    • Tobacco user 7/8/2019       Family History   Problem Relation Age of Onset   • Heart disease Other    • Hypertension Other        Social History     Socioeconomic History   • Marital status:      Spouse name: Not on file   • Number of children: Not on file   • Years of education: Not on file   • Highest education level: Not on file   Tobacco Use   • Smoking status: Current Every Day Smoker     Packs/day: 1.25     Years: 60.00     Pack years: 75.00   • Smokeless tobacco: Never Used   Substance and Sexual Activity   • Alcohol use: No   • Drug use: No   • Sexual activity: Defer       Review of Systems   Constitutional: Positive for fatigue. Negative for activity change, chills and fever.   HENT: Positive for nosebleeds and postnasal drip. Negative for congestion, rhinorrhea, sinus pressure, sore throat and trouble swallowing.    Eyes: Negative for blurred vision, double vision and pain.   Respiratory: Positive for cough and shortness of breath. Negative for chest tightness and wheezing.    Cardiovascular: Negative for chest pain, palpitations and leg swelling.   Gastrointestinal: Negative for abdominal distention, constipation, diarrhea, nausea and vomiting.   Endocrine: Negative for polydipsia, polyphagia and polyuria.   Genitourinary: Negative for dysuria, frequency and urgency.   Musculoskeletal: Negative for arthralgias, back pain, gait problem and joint swelling.   Skin: Negative for color change, dry skin, rash and skin lesions.   Allergic/Immunologic: Negative for environmental allergies, food allergies and immunocompromised state.   Neurological: Negative for dizziness, seizures, speech difficulty, weakness, light-headedness, memory problem and confusion.   Hematological: Negative for adenopathy. Does not bruise/bleed easily.   Psychiatric/Behavioral: Positive for agitation. Negative for sleep disturbance, negative for hyperactivity and depressed mood. The patient is  nervous/anxious.        .vs    Physical Exam   Constitutional: She is oriented to person, place, and time. She appears well-developed and well-nourished. No distress.   HENT:   Head: Normocephalic and atraumatic.   Right Ear: External ear normal.   Left Ear: External ear normal.   Nose: Nose normal.   Mouth/Throat: Oropharynx is clear and moist. No oropharyngeal exudate.   Eyes: Conjunctivae and EOM are normal. Pupils are equal, round, and reactive to light. Right eye exhibits no discharge. Left eye exhibits no discharge.   Neck: Normal range of motion. Neck supple. No JVD present.   Cardiovascular: Normal rate and regular rhythm.   No murmur heard.  Pulmonary/Chest: Effort normal. No accessory muscle usage. No tachypnea. No respiratory distress. She has decreased breath sounds. She has no wheezes.   Abdominal: Soft. Bowel sounds are normal. She exhibits no distension. There is no tenderness.   Musculoskeletal: Normal range of motion. She exhibits no edema or deformity.   Kyphosis   Neurological: She is alert and oriented to person, place, and time. She displays normal reflexes. No cranial nerve deficit. Coordination normal.   Skin: Skin is warm and dry. No rash noted. She is not diaphoretic. No erythema.   Psychiatric: She has a normal mood and affect. Her behavior is normal. Thought content normal.   Nursing note and vitals reviewed.      Pulmonary Functions Testing Results:    FEV1   Date Value Ref Range Status   09/05/2019 49% liters Final     FVC   Date Value Ref Range Status   09/05/2019 62% liters Final     FEV1/FVC   Date Value Ref Range Status   09/05/2019 59.83 % Final     TLC   Date Value Ref Range Status   09/05/2019 91% liters Final     DLCO   Date Value Ref Range Status   09/05/2019 41% ml/mmHg sec Final     My interpretation of her PFTs reflects severe obstructive disease with reduced mid flows, elevated residual volume and total lung capacity consistent with gas trapping and obstruction, severe  diffusion impairment when corrected for alveolar volume was moderate    CXR: Chest x-ray imaging reviewed from April 2019 at [facility] shows chronic interstitial changes and some calcified granulomas otherwise nothing acute        Problem List Items Addressed This Visit        Cardiovascular and Mediastinum    Hypertension    Relevant Medications    carvedilol (COREG) 25 MG tablet    hydrochlorothiazide (HYDRODIURIL) 12.5 MG tablet       Respiratory    Shortness of breath - Primary    Relevant Orders    Pulmonary Function Test (Completed)    Calcified granuloma of lung (CMS/[facility])    Relevant Medications    montelukast (SINGULAIR) 10 MG tablet    ipratropium-albuterol (COMBIVENT RESPIMAT)  MCG/ACT inhaler    budesonide-formoterol (SYMBICORT) 160-4.5 MCG/ACT inhaler    mometasone-formoterol (DULERA) 100-5 MCG/ACT inhaler    Nocturnal hypoxia       Digestive    Gastroesophageal reflux disease without esophagitis       Other    Tobacco user    Personal history of nicotine dependence      Other Visit Diagnoses     COPD, group C, by GOLD 2017 classification (CMS/[facility])        Relevant Medications    montelukast (SINGULAIR) 10 MG tablet    ipratropium-albuterol (COMBIVENT RESPIMAT)  MCG/ACT inhaler    budesonide-formoterol (SYMBICORT) 160-4.5 MCG/ACT inhaler    mometasone-formoterol (DULERA) 100-5 MCG/ACT inhaler    Anxiety disorder, unspecified type        Relevant Medications    FLUoxetine (PROzac) 20 MG capsule        Patient's Body mass index is 22.07 kg/m². BMI is within normal parameters. No follow-up required..      Assessment/Plan         COPD is confirmed.  She is on short acting bronchodilators.  I will place her on long-acting beta samples.  She was given samples of full dose Symbicort as well as full dose Dulera.  Demonstration of the device was given to her.  She was instructed not to use them together however she can use them with her emergency medicine if needed.  She should also rinse her mouth well  after use.  She can continue the Singulair since it seems to be helpful.  Medications refilled until she can see her primary care provider.  Reassessment in 3 months however I could see her sooner if needed.    Marry Rodriguez, APRN  9/5/2019  1:22 PM    Return in about 3 months (around 12/5/2019).

## 2019-09-05 ENCOUNTER — OFFICE VISIT (OUTPATIENT)
Dept: PULMONOLOGY | Facility: CLINIC | Age: 84
End: 2019-09-05

## 2019-09-05 VITALS
DIASTOLIC BLOOD PRESSURE: 76 MMHG | WEIGHT: 113 LBS | SYSTOLIC BLOOD PRESSURE: 128 MMHG | BODY MASS INDEX: 22.19 KG/M2 | HEART RATE: 78 BPM | OXYGEN SATURATION: 95 % | HEIGHT: 60 IN

## 2019-09-05 DIAGNOSIS — J44.9 CHRONIC OBSTRUCTIVE PULMONARY DISEASE, UNSPECIFIED COPD TYPE (HCC): Primary | ICD-10-CM

## 2019-09-05 DIAGNOSIS — G47.34 NOCTURNAL HYPOXIA: ICD-10-CM

## 2019-09-05 DIAGNOSIS — K21.9 GASTROESOPHAGEAL REFLUX DISEASE WITHOUT ESOPHAGITIS: ICD-10-CM

## 2019-09-05 DIAGNOSIS — F41.9 ANXIETY DISORDER, UNSPECIFIED TYPE: ICD-10-CM

## 2019-09-05 DIAGNOSIS — J84.10 CALCIFIED GRANULOMA OF LUNG (HCC): ICD-10-CM

## 2019-09-05 DIAGNOSIS — R06.02 SHORTNESS OF BREATH: Primary | ICD-10-CM

## 2019-09-05 DIAGNOSIS — I10 ESSENTIAL HYPERTENSION: ICD-10-CM

## 2019-09-05 DIAGNOSIS — J44.9 COPD, GROUP C, BY GOLD 2017 CLASSIFICATION (HCC): ICD-10-CM

## 2019-09-05 DIAGNOSIS — Z72.0 TOBACCO USER: ICD-10-CM

## 2019-09-05 DIAGNOSIS — Z87.891 PERSONAL HISTORY OF NICOTINE DEPENDENCE: ICD-10-CM

## 2019-09-05 LAB
DIFF CAP.CO: NORMAL ML/MMHG SEC
FEV1/FVC: 59.83 %
FEV1: NORMAL LITERS
FVC VOL RESPIRATORY: NORMAL LITERS
TLC: NORMAL LITERS

## 2019-09-05 PROCEDURE — 94729 DIFFUSING CAPACITY: CPT | Performed by: NURSE PRACTITIONER

## 2019-09-05 PROCEDURE — 94727 GAS DIL/WSHOT DETER LNG VOL: CPT | Performed by: NURSE PRACTITIONER

## 2019-09-05 PROCEDURE — 94010 BREATHING CAPACITY TEST: CPT | Performed by: NURSE PRACTITIONER

## 2019-09-05 PROCEDURE — 99214 OFFICE O/P EST MOD 30 MIN: CPT | Performed by: NURSE PRACTITIONER

## 2019-09-05 RX ORDER — MONTELUKAST SODIUM 10 MG/1
10 TABLET ORAL NIGHTLY
Refills: 2 | COMMUNITY
Start: 2019-07-22

## 2019-09-05 RX ORDER — FLUOXETINE HYDROCHLORIDE 20 MG/1
CAPSULE ORAL
Refills: 1 | COMMUNITY
Start: 2019-05-30 | End: 2019-09-05 | Stop reason: SDUPTHER

## 2019-09-05 RX ORDER — FLUOXETINE HYDROCHLORIDE 20 MG/1
20 CAPSULE ORAL 2 TIMES DAILY
Qty: 60 CAPSULE | Refills: 2 | Status: ON HOLD | OUTPATIENT
Start: 2019-09-05 | End: 2019-09-27 | Stop reason: DRUGHIGH

## 2019-09-05 RX ORDER — HYDROCHLOROTHIAZIDE 12.5 MG/1
12.5 TABLET ORAL DAILY
Qty: 30 TABLET | Refills: 2 | Status: SHIPPED | OUTPATIENT
Start: 2019-09-05 | End: 2019-10-01 | Stop reason: HOSPADM

## 2019-09-05 RX ORDER — CARVEDILOL 25 MG/1
25 TABLET ORAL 2 TIMES DAILY WITH MEALS
Qty: 60 TABLET | Refills: 2 | Status: ON HOLD | OUTPATIENT
Start: 2019-09-05 | End: 2019-10-01 | Stop reason: SDUPTHER

## 2019-09-05 RX ORDER — BUDESONIDE AND FORMOTEROL FUMARATE DIHYDRATE 160; 4.5 UG/1; UG/1
2 AEROSOL RESPIRATORY (INHALATION)
Qty: 1 INHALER | Refills: 0 | COMMUNITY
Start: 2019-09-05 | End: 2019-09-19

## 2019-09-05 ASSESSMENT — PULMONARY FUNCTION TESTS: FEV1/FVC: 59.83

## 2019-09-26 ENCOUNTER — APPOINTMENT (OUTPATIENT)
Dept: CT IMAGING | Facility: HOSPITAL | Age: 84
End: 2019-09-26

## 2019-09-26 ENCOUNTER — HOSPITAL ENCOUNTER (INPATIENT)
Facility: HOSPITAL | Age: 84
LOS: 4 days | Discharge: NURSING, REHAB OR LONG-TERM CARE | End: 2019-10-01
Attending: FAMILY MEDICINE | Admitting: FAMILY MEDICINE

## 2019-09-26 ENCOUNTER — APPOINTMENT (OUTPATIENT)
Dept: GENERAL RADIOLOGY | Facility: HOSPITAL | Age: 84
End: 2019-09-26

## 2019-09-26 DIAGNOSIS — R07.9 CHEST PAIN, UNSPECIFIED TYPE: Primary | ICD-10-CM

## 2019-09-26 DIAGNOSIS — Z78.9 DECREASED ACTIVITIES OF DAILY LIVING (ADL): ICD-10-CM

## 2019-09-26 DIAGNOSIS — M99.79 FORAMINAL STENOSIS DUE TO INTERVERTEBRAL DISC DISEASE: ICD-10-CM

## 2019-09-26 DIAGNOSIS — Z74.09 IMPAIRED MOBILITY: ICD-10-CM

## 2019-09-26 DIAGNOSIS — M48.56XA NON-TRAUMATIC COMPRESSION FRACTURE OF FIRST LUMBAR VERTEBRA, INITIAL ENCOUNTER: ICD-10-CM

## 2019-09-26 DIAGNOSIS — M51.9 FORAMINAL STENOSIS DUE TO INTERVERTEBRAL DISC DISEASE: ICD-10-CM

## 2019-09-26 DIAGNOSIS — I10 ESSENTIAL HYPERTENSION: ICD-10-CM

## 2019-09-26 DIAGNOSIS — I50.9 CONGESTIVE HEART FAILURE, UNSPECIFIED HF CHRONICITY, UNSPECIFIED HEART FAILURE TYPE (HCC): ICD-10-CM

## 2019-09-26 LAB
ALBUMIN SERPL-MCNC: 3.8 G/DL (ref 3.5–5.2)
ALBUMIN/GLOB SERPL: 1.2 G/DL
ALP SERPL-CCNC: 93 U/L (ref 39–117)
ALT SERPL W P-5'-P-CCNC: 7 U/L (ref 1–33)
ANION GAP SERPL CALCULATED.3IONS-SCNC: 11 MMOL/L (ref 5–15)
AST SERPL-CCNC: 17 U/L (ref 1–32)
BASOPHILS # BLD AUTO: 0.03 10*3/MM3 (ref 0–0.2)
BASOPHILS NFR BLD AUTO: 0.3 % (ref 0–1.5)
BILIRUB SERPL-MCNC: 0.2 MG/DL (ref 0.2–1.2)
BUN BLD-MCNC: 21 MG/DL (ref 8–23)
BUN/CREAT SERPL: 31.8 (ref 7–25)
CALCIUM SPEC-SCNC: 9 MG/DL (ref 8.6–10.5)
CHLORIDE SERPL-SCNC: 100 MMOL/L (ref 98–107)
CO2 SERPL-SCNC: 29 MMOL/L (ref 22–29)
CREAT BLD-MCNC: 0.66 MG/DL (ref 0.57–1)
DEPRECATED RDW RBC AUTO: 50.3 FL (ref 37–54)
EOSINOPHIL # BLD AUTO: 0.06 10*3/MM3 (ref 0–0.4)
EOSINOPHIL NFR BLD AUTO: 0.7 % (ref 0.3–6.2)
ERYTHROCYTE [DISTWIDTH] IN BLOOD BY AUTOMATED COUNT: 13.9 % (ref 12.3–15.4)
GFR SERPL CREATININE-BSD FRML MDRD: 86 ML/MIN/1.73
GLOBULIN UR ELPH-MCNC: 3.2 GM/DL
GLUCOSE BLD-MCNC: 180 MG/DL (ref 65–99)
HCT VFR BLD AUTO: 45.5 % (ref 34–46.6)
HGB BLD-MCNC: 15.6 G/DL (ref 12–15.9)
HOLD SPECIMEN: NORMAL
IMM GRANULOCYTES # BLD AUTO: 0.03 10*3/MM3 (ref 0–0.05)
IMM GRANULOCYTES NFR BLD AUTO: 0.3 % (ref 0–0.5)
LYMPHOCYTES # BLD AUTO: 1.65 10*3/MM3 (ref 0.7–3.1)
LYMPHOCYTES NFR BLD AUTO: 19.1 % (ref 19.6–45.3)
MCH RBC QN AUTO: 33.6 PG (ref 26.6–33)
MCHC RBC AUTO-ENTMCNC: 34.3 G/DL (ref 31.5–35.7)
MCV RBC AUTO: 98.1 FL (ref 79–97)
MONOCYTES # BLD AUTO: 0.54 10*3/MM3 (ref 0.1–0.9)
MONOCYTES NFR BLD AUTO: 6.3 % (ref 5–12)
NEUTROPHILS # BLD AUTO: 6.31 10*3/MM3 (ref 1.7–7)
NEUTROPHILS NFR BLD AUTO: 73.3 % (ref 42.7–76)
NRBC BLD AUTO-RTO: 0 /100 WBC (ref 0–0.2)
NT-PROBNP SERPL-MCNC: 9804 PG/ML (ref 5–1800)
PLATELET # BLD AUTO: 300 10*3/MM3 (ref 140–450)
PMV BLD AUTO: 9.5 FL (ref 6–12)
POTASSIUM BLD-SCNC: 3.7 MMOL/L (ref 3.5–5.2)
PROT SERPL-MCNC: 7 G/DL (ref 6–8.5)
RBC # BLD AUTO: 4.64 10*6/MM3 (ref 3.77–5.28)
SODIUM BLD-SCNC: 140 MMOL/L (ref 136–145)
TROPONIN T SERPL-MCNC: 0.01 NG/ML (ref 0–0.03)
TROPONIN T SERPL-MCNC: 0.02 NG/ML (ref 0–0.03)
WBC NRBC COR # BLD: 8.62 10*3/MM3 (ref 3.4–10.8)
WHOLE BLOOD HOLD SPECIMEN: NORMAL
WHOLE BLOOD HOLD SPECIMEN: NORMAL

## 2019-09-26 PROCEDURE — 72131 CT LUMBAR SPINE W/O DYE: CPT

## 2019-09-26 PROCEDURE — 93010 ELECTROCARDIOGRAM REPORT: CPT | Performed by: INTERNAL MEDICINE

## 2019-09-26 PROCEDURE — 25010000002 FUROSEMIDE PER 20 MG: Performed by: FAMILY MEDICINE

## 2019-09-26 PROCEDURE — 93005 ELECTROCARDIOGRAM TRACING: CPT | Performed by: FAMILY MEDICINE

## 2019-09-26 PROCEDURE — 84484 ASSAY OF TROPONIN QUANT: CPT | Performed by: FAMILY MEDICINE

## 2019-09-26 PROCEDURE — 71045 X-RAY EXAM CHEST 1 VIEW: CPT

## 2019-09-26 PROCEDURE — 93005 ELECTROCARDIOGRAM TRACING: CPT

## 2019-09-26 PROCEDURE — 83880 ASSAY OF NATRIURETIC PEPTIDE: CPT

## 2019-09-26 PROCEDURE — 99284 EMERGENCY DEPT VISIT MOD MDM: CPT

## 2019-09-26 PROCEDURE — 70486 CT MAXILLOFACIAL W/O DYE: CPT

## 2019-09-26 PROCEDURE — 85025 COMPLETE CBC W/AUTO DIFF WBC: CPT

## 2019-09-26 PROCEDURE — 80053 COMPREHEN METABOLIC PANEL: CPT

## 2019-09-26 PROCEDURE — 93005 ELECTROCARDIOGRAM TRACING: CPT | Performed by: EMERGENCY MEDICINE

## 2019-09-26 RX ORDER — FUROSEMIDE 10 MG/ML
40 INJECTION INTRAMUSCULAR; INTRAVENOUS ONCE
Status: COMPLETED | OUTPATIENT
Start: 2019-09-26 | End: 2019-09-26

## 2019-09-26 RX ORDER — SODIUM CHLORIDE 0.9 % (FLUSH) 0.9 %
10 SYRINGE (ML) INJECTION AS NEEDED
Status: DISCONTINUED | OUTPATIENT
Start: 2019-09-26 | End: 2019-10-01 | Stop reason: HOSPADM

## 2019-09-26 RX ADMIN — FUROSEMIDE 40 MG: 10 INJECTION, SOLUTION INTRAVENOUS at 21:28

## 2019-09-27 ENCOUNTER — APPOINTMENT (OUTPATIENT)
Dept: CARDIOLOGY | Facility: HOSPITAL | Age: 84
End: 2019-09-27

## 2019-09-27 PROBLEM — R29.6 FALLING: Status: ACTIVE | Noted: 2019-09-27

## 2019-09-27 PROBLEM — I50.9 CHF EXACERBATION (HCC): Status: ACTIVE | Noted: 2019-09-27

## 2019-09-27 PROBLEM — M54.9 BACK PAIN: Status: ACTIVE | Noted: 2019-09-27

## 2019-09-27 PROBLEM — R55 VASOVAGAL SYNCOPE: Status: ACTIVE | Noted: 2019-09-27

## 2019-09-27 PROBLEM — R07.9 CHEST PAIN: Status: ACTIVE | Noted: 2019-09-27

## 2019-09-27 LAB
ALBUMIN SERPL-MCNC: 3.8 G/DL (ref 3.5–5.2)
ALBUMIN/GLOB SERPL: 1.3 G/DL
ALP SERPL-CCNC: 102 U/L (ref 39–117)
ALT SERPL W P-5'-P-CCNC: 7 U/L (ref 1–33)
ANION GAP SERPL CALCULATED.3IONS-SCNC: 10 MMOL/L (ref 5–15)
AST SERPL-CCNC: 20 U/L (ref 1–32)
BASOPHILS # BLD AUTO: 0.05 10*3/MM3 (ref 0–0.2)
BASOPHILS NFR BLD AUTO: 0.6 % (ref 0–1.5)
BH CV ECHO MEAS - AO MAX PG (FULL): -35.7 MMHG
BH CV ECHO MEAS - AO MAX PG: 26.4 MMHG
BH CV ECHO MEAS - AO MEAN PG (FULL): -15 MMHG
BH CV ECHO MEAS - AO MEAN PG: 14 MMHG
BH CV ECHO MEAS - AO ROOT AREA (BSA CORRECTED): 2.5
BH CV ECHO MEAS - AO ROOT AREA: 9.6 CM^2
BH CV ECHO MEAS - AO ROOT DIAM: 3.5 CM
BH CV ECHO MEAS - AO V2 MAX: 257 CM/SEC
BH CV ECHO MEAS - AO V2 MEAN: 174 CM/SEC
BH CV ECHO MEAS - AO V2 VTI: 54.3 CM
BH CV ECHO MEAS - AVA(I,A): 4 CM^2
BH CV ECHO MEAS - AVA(I,D): 4 CM^2
BH CV ECHO MEAS - AVA(V,A): 4.3 CM^2
BH CV ECHO MEAS - AVA(V,D): 4.3 CM^2
BH CV ECHO MEAS - BSA(HAYCOCK): 1.4 M^2
BH CV ECHO MEAS - BSA: 1.4 M^2
BH CV ECHO MEAS - BZI_BMI: 19.7 KILOGRAMS/M^2
BH CV ECHO MEAS - BZI_METRIC_HEIGHT: 152.4 CM
BH CV ECHO MEAS - BZI_METRIC_WEIGHT: 45.8 KG
BH CV ECHO MEAS - EDV(CUBED): 29.2 ML
BH CV ECHO MEAS - EDV(MOD-SP2): 36.5 ML
BH CV ECHO MEAS - EDV(MOD-SP4): 44.1 ML
BH CV ECHO MEAS - EDV(TEICH): 37.3 ML
BH CV ECHO MEAS - EF(CUBED): 45.9 %
BH CV ECHO MEAS - EF(MOD-SP2): 71.5 %
BH CV ECHO MEAS - EF(MOD-SP4): 46.5 %
BH CV ECHO MEAS - EF(TEICH): 39.6 %
BH CV ECHO MEAS - ESV(CUBED): 15.8 ML
BH CV ECHO MEAS - ESV(MOD-SP2): 10.4 ML
BH CV ECHO MEAS - ESV(MOD-SP4): 23.6 ML
BH CV ECHO MEAS - ESV(TEICH): 22.5 ML
BH CV ECHO MEAS - FS: 18.5 %
BH CV ECHO MEAS - IVS/LVPW: 0.93
BH CV ECHO MEAS - IVSD: 1.8 CM
BH CV ECHO MEAS - LA DIMENSION: 3.7 CM
BH CV ECHO MEAS - LA/AO: 1.1
BH CV ECHO MEAS - LAT PEAK E' VEL: 2.8 CM/SEC
BH CV ECHO MEAS - LV DIASTOLIC VOL/BSA (35-75): 31.6 ML/M^2
BH CV ECHO MEAS - LV MASS(C)D: 134.5 GRAMS
BH CV ECHO MEAS - LV MASS(C)DI: 96.3 GRAMS/M^2
BH CV ECHO MEAS - LV MAX PG: 62.1 MMHG
BH CV ECHO MEAS - LV MEAN PG: 29 MMHG
BH CV ECHO MEAS - LV SYSTOLIC VOL/BSA (12-30): 16.9 ML/M^2
BH CV ECHO MEAS - LV V1 MAX: 394 CM/SEC
BH CV ECHO MEAS - LV V1 MEAN: 238 CM/SEC
BH CV ECHO MEAS - LV V1 VTI: 76.9 CM
BH CV ECHO MEAS - LVIDD: 3.1 CM
BH CV ECHO MEAS - LVIDS: 2.5 CM
BH CV ECHO MEAS - LVLD AP2: 6.7 CM
BH CV ECHO MEAS - LVLD AP4: 7.6 CM
BH CV ECHO MEAS - LVLS AP2: 5.6 CM
BH CV ECHO MEAS - LVLS AP4: 6.7 CM
BH CV ECHO MEAS - LVOT AREA (M): 2.8 CM^2
BH CV ECHO MEAS - LVOT AREA: 2.8 CM^2
BH CV ECHO MEAS - LVOT DIAM: 1.9 CM
BH CV ECHO MEAS - LVPWD: 1.8 CM
BH CV ECHO MEAS - MED PEAK E' VEL: 2 CM/SEC
BH CV ECHO MEAS - MR MAX PG: 213 MMHG
BH CV ECHO MEAS - MR MAX VEL: 725 CM/SEC
BH CV ECHO MEAS - MV A MAX VEL: 129 CM/SEC
BH CV ECHO MEAS - MV DEC SLOPE: 200 CM/SEC^2
BH CV ECHO MEAS - MV DEC TIME: 0.29 SEC
BH CV ECHO MEAS - MV E MAX VEL: 52.9 CM/SEC
BH CV ECHO MEAS - MV E/A: 0.41
BH CV ECHO MEAS - MV P1/2T MAX VEL: 73.3 CM/SEC
BH CV ECHO MEAS - MV P1/2T: 107.3 MSEC
BH CV ECHO MEAS - MVA P1/2T LCG: 3 CM^2
BH CV ECHO MEAS - MVA(P1/2T): 2 CM^2
BH CV ECHO MEAS - SI(AO): 374.2 ML/M^2
BH CV ECHO MEAS - SI(CUBED): 9.6 ML/M^2
BH CV ECHO MEAS - SI(LVOT): 156.2 ML/M^2
BH CV ECHO MEAS - SI(MOD-SP2): 18.7 ML/M^2
BH CV ECHO MEAS - SI(MOD-SP4): 14.7 ML/M^2
BH CV ECHO MEAS - SI(TEICH): 10.6 ML/M^2
BH CV ECHO MEAS - SV(AO): 522.4 ML
BH CV ECHO MEAS - SV(CUBED): 13.4 ML
BH CV ECHO MEAS - SV(LVOT): 218 ML
BH CV ECHO MEAS - SV(MOD-SP2): 26.1 ML
BH CV ECHO MEAS - SV(MOD-SP4): 20.5 ML
BH CV ECHO MEAS - SV(TEICH): 14.8 ML
BH CV ECHO MEAS - TR MAX VEL: 150 CM/SEC
BH CV ECHO MEASUREMENTS AVERAGE E/E' RATIO: 22.04
BILIRUB SERPL-MCNC: 0.2 MG/DL (ref 0.2–1.2)
BUN BLD-MCNC: 19 MG/DL (ref 8–23)
BUN/CREAT SERPL: 24.1 (ref 7–25)
CALCIUM SPEC-SCNC: 9 MG/DL (ref 8.6–10.5)
CHLORIDE SERPL-SCNC: 102 MMOL/L (ref 98–107)
CHOLEST SERPL-MCNC: 237 MG/DL (ref 0–200)
CO2 SERPL-SCNC: 32 MMOL/L (ref 22–29)
CREAT BLD-MCNC: 0.79 MG/DL (ref 0.57–1)
DEPRECATED RDW RBC AUTO: 50.4 FL (ref 37–54)
EOSINOPHIL # BLD AUTO: 0.17 10*3/MM3 (ref 0–0.4)
EOSINOPHIL NFR BLD AUTO: 1.9 % (ref 0.3–6.2)
ERYTHROCYTE [DISTWIDTH] IN BLOOD BY AUTOMATED COUNT: 13.7 % (ref 12.3–15.4)
GFR SERPL CREATININE-BSD FRML MDRD: 70 ML/MIN/1.73
GLOBULIN UR ELPH-MCNC: 3 GM/DL
GLUCOSE BLD-MCNC: 108 MG/DL (ref 65–99)
GLUCOSE BLDC GLUCOMTR-MCNC: 134 MG/DL (ref 70–130)
HBA1C MFR BLD: 5.1 % (ref 4.8–5.6)
HCT VFR BLD AUTO: 44.2 % (ref 34–46.6)
HDLC SERPL-MCNC: 59 MG/DL (ref 40–60)
HGB BLD-MCNC: 14.9 G/DL (ref 12–15.9)
IMM GRANULOCYTES # BLD AUTO: 0.03 10*3/MM3 (ref 0–0.05)
IMM GRANULOCYTES NFR BLD AUTO: 0.3 % (ref 0–0.5)
LDLC SERPL CALC-MCNC: 122 MG/DL (ref 0–100)
LDLC/HDLC SERPL: 2.07 {RATIO}
LEFT ATRIUM VOLUME INDEX: 47.4 ML/M2
LV EF 2D ECHO EST: 65 %
LYMPHOCYTES # BLD AUTO: 2.95 10*3/MM3 (ref 0.7–3.1)
LYMPHOCYTES NFR BLD AUTO: 33.4 % (ref 19.6–45.3)
MAXIMAL PREDICTED HEART RATE: 137 BPM
MCH RBC QN AUTO: 33.4 PG (ref 26.6–33)
MCHC RBC AUTO-ENTMCNC: 33.7 G/DL (ref 31.5–35.7)
MCV RBC AUTO: 99.1 FL (ref 79–97)
MONOCYTES # BLD AUTO: 0.76 10*3/MM3 (ref 0.1–0.9)
MONOCYTES NFR BLD AUTO: 8.6 % (ref 5–12)
NEUTROPHILS # BLD AUTO: 4.88 10*3/MM3 (ref 1.7–7)
NEUTROPHILS NFR BLD AUTO: 55.2 % (ref 42.7–76)
NRBC BLD AUTO-RTO: 0 /100 WBC (ref 0–0.2)
PLATELET # BLD AUTO: 264 10*3/MM3 (ref 140–450)
PMV BLD AUTO: 9.7 FL (ref 6–12)
POTASSIUM BLD-SCNC: 3.2 MMOL/L (ref 3.5–5.2)
PROT SERPL-MCNC: 6.8 G/DL (ref 6–8.5)
RBC # BLD AUTO: 4.46 10*6/MM3 (ref 3.77–5.28)
SODIUM BLD-SCNC: 144 MMOL/L (ref 136–145)
STRESS TARGET HR: 116 BPM
TRIGL SERPL-MCNC: 278 MG/DL (ref 0–150)
TROPONIN T SERPL-MCNC: 0.02 NG/ML (ref 0–0.03)
TROPONIN T SERPL-MCNC: 0.02 NG/ML (ref 0–0.03)
TSH SERPL DL<=0.05 MIU/L-ACNC: 3.21 UIU/ML (ref 0.27–4.2)
VLDLC SERPL-MCNC: 55.6 MG/DL
WBC NRBC COR # BLD: 8.84 10*3/MM3 (ref 3.4–10.8)

## 2019-09-27 PROCEDURE — 84443 ASSAY THYROID STIM HORMONE: CPT | Performed by: FAMILY MEDICINE

## 2019-09-27 PROCEDURE — 93306 TTE W/DOPPLER COMPLETE: CPT

## 2019-09-27 PROCEDURE — 25010000002 ALBUMIN HUMAN 25% PER 50 ML: Performed by: NURSE PRACTITIONER

## 2019-09-27 PROCEDURE — 80061 LIPID PANEL: CPT | Performed by: FAMILY MEDICINE

## 2019-09-27 PROCEDURE — 80053 COMPREHEN METABOLIC PANEL: CPT | Performed by: FAMILY MEDICINE

## 2019-09-27 PROCEDURE — 97162 PT EVAL MOD COMPLEX 30 MIN: CPT

## 2019-09-27 PROCEDURE — 94760 N-INVAS EAR/PLS OXIMETRY 1: CPT

## 2019-09-27 PROCEDURE — 82962 GLUCOSE BLOOD TEST: CPT

## 2019-09-27 PROCEDURE — 85025 COMPLETE CBC W/AUTO DIFF WBC: CPT | Performed by: FAMILY MEDICINE

## 2019-09-27 PROCEDURE — 83036 HEMOGLOBIN GLYCOSYLATED A1C: CPT | Performed by: FAMILY MEDICINE

## 2019-09-27 PROCEDURE — 97165 OT EVAL LOW COMPLEX 30 MIN: CPT

## 2019-09-27 PROCEDURE — 25010000002 FUROSEMIDE PER 20 MG: Performed by: FAMILY MEDICINE

## 2019-09-27 PROCEDURE — 94799 UNLISTED PULMONARY SVC/PX: CPT

## 2019-09-27 PROCEDURE — 84484 ASSAY OF TROPONIN QUANT: CPT | Performed by: FAMILY MEDICINE

## 2019-09-27 PROCEDURE — 93306 TTE W/DOPPLER COMPLETE: CPT | Performed by: INTERNAL MEDICINE

## 2019-09-27 PROCEDURE — P9047 ALBUMIN (HUMAN), 25%, 50ML: HCPCS | Performed by: NURSE PRACTITIONER

## 2019-09-27 RX ORDER — CHLORTHALIDONE 25 MG/1
25 TABLET ORAL DAILY
Status: ON HOLD | COMMUNITY
End: 2019-09-27

## 2019-09-27 RX ORDER — ASPIRIN 81 MG/1
81 TABLET, CHEWABLE ORAL DAILY
Status: DISCONTINUED | OUTPATIENT
Start: 2019-09-27 | End: 2019-10-01 | Stop reason: HOSPADM

## 2019-09-27 RX ORDER — ARIPIPRAZOLE 2 MG/1
2 TABLET ORAL NIGHTLY
COMMUNITY

## 2019-09-27 RX ORDER — GABAPENTIN 300 MG/1
300 CAPSULE ORAL EVERY 8 HOURS SCHEDULED
Status: DISCONTINUED | OUTPATIENT
Start: 2019-09-27 | End: 2019-10-01 | Stop reason: HOSPADM

## 2019-09-27 RX ORDER — ALBUMIN (HUMAN) 12.5 G/50ML
25 SOLUTION INTRAVENOUS ONCE
Status: COMPLETED | OUTPATIENT
Start: 2019-09-27 | End: 2019-09-27

## 2019-09-27 RX ORDER — IPRATROPIUM BROMIDE AND ALBUTEROL SULFATE 2.5; .5 MG/3ML; MG/3ML
3 SOLUTION RESPIRATORY (INHALATION)
Status: DISCONTINUED | OUTPATIENT
Start: 2019-09-27 | End: 2019-10-01 | Stop reason: HOSPADM

## 2019-09-27 RX ORDER — OXYCODONE AND ACETAMINOPHEN 10; 325 MG/1; MG/1
1 TABLET ORAL 3 TIMES DAILY PRN
Status: DISCONTINUED | OUTPATIENT
Start: 2019-09-27 | End: 2019-09-29

## 2019-09-27 RX ORDER — HYDROCHLOROTHIAZIDE 25 MG/1
12.5 TABLET ORAL DAILY
Status: DISCONTINUED | OUTPATIENT
Start: 2019-09-27 | End: 2019-09-27

## 2019-09-27 RX ORDER — SODIUM CHLORIDE 0.9 % (FLUSH) 0.9 %
10 SYRINGE (ML) INJECTION EVERY 12 HOURS SCHEDULED
Status: DISCONTINUED | OUTPATIENT
Start: 2019-09-27 | End: 2019-10-01 | Stop reason: HOSPADM

## 2019-09-27 RX ORDER — FLUOXETINE 10 MG/1
10 CAPSULE ORAL DAILY
Status: DISCONTINUED | OUTPATIENT
Start: 2019-09-27 | End: 2019-09-27

## 2019-09-27 RX ORDER — FLUOXETINE HYDROCHLORIDE 20 MG/1
60 CAPSULE ORAL DAILY
Status: DISCONTINUED | OUTPATIENT
Start: 2019-09-28 | End: 2019-10-01 | Stop reason: HOSPADM

## 2019-09-27 RX ORDER — FUROSEMIDE 10 MG/ML
20 INJECTION INTRAMUSCULAR; INTRAVENOUS
Status: COMPLETED | OUTPATIENT
Start: 2019-09-27 | End: 2019-09-28

## 2019-09-27 RX ORDER — SODIUM CHLORIDE 0.9 % (FLUSH) 0.9 %
10 SYRINGE (ML) INJECTION AS NEEDED
Status: DISCONTINUED | OUTPATIENT
Start: 2019-09-27 | End: 2019-10-01 | Stop reason: HOSPADM

## 2019-09-27 RX ORDER — MONTELUKAST SODIUM 10 MG/1
5 TABLET ORAL DAILY
Status: DISCONTINUED | OUTPATIENT
Start: 2019-09-27 | End: 2019-10-01 | Stop reason: HOSPADM

## 2019-09-27 RX ORDER — CARVEDILOL 25 MG/1
25 TABLET ORAL 2 TIMES DAILY WITH MEALS
Status: DISCONTINUED | OUTPATIENT
Start: 2019-09-27 | End: 2019-09-27

## 2019-09-27 RX ORDER — FLUOXETINE HYDROCHLORIDE 20 MG/1
60 CAPSULE ORAL DAILY
COMMUNITY

## 2019-09-27 RX ORDER — POTASSIUM CHLORIDE 750 MG/1
40 CAPSULE, EXTENDED RELEASE ORAL ONCE
Status: COMPLETED | OUTPATIENT
Start: 2019-09-27 | End: 2019-09-27

## 2019-09-27 RX ADMIN — CARVEDILOL 25 MG: 25 TABLET, FILM COATED ORAL at 08:27

## 2019-09-27 RX ADMIN — GABAPENTIN 300 MG: 300 CAPSULE ORAL at 19:58

## 2019-09-27 RX ADMIN — GABAPENTIN 300 MG: 300 CAPSULE ORAL at 16:29

## 2019-09-27 RX ADMIN — IPRATROPIUM BROMIDE AND ALBUTEROL SULFATE 3 ML: 2.5; .5 SOLUTION RESPIRATORY (INHALATION) at 07:00

## 2019-09-27 RX ADMIN — ASPIRIN 81 MG: 81 TABLET, CHEWABLE ORAL at 08:28

## 2019-09-27 RX ADMIN — IPRATROPIUM BROMIDE AND ALBUTEROL SULFATE 3 ML: 2.5; .5 SOLUTION RESPIRATORY (INHALATION) at 19:03

## 2019-09-27 RX ADMIN — HYDROCHLOROTHIAZIDE 12.5 MG: 25 TABLET ORAL at 08:28

## 2019-09-27 RX ADMIN — FUROSEMIDE 20 MG: 10 INJECTION, SOLUTION INTRAMUSCULAR; INTRAVENOUS at 08:28

## 2019-09-27 RX ADMIN — SODIUM CHLORIDE, PRESERVATIVE FREE 10 ML: 5 INJECTION INTRAVENOUS at 19:58

## 2019-09-27 RX ADMIN — OXYCODONE HYDROCHLORIDE AND ACETAMINOPHEN 1 TABLET: 10; 325 TABLET ORAL at 20:02

## 2019-09-27 RX ADMIN — GABAPENTIN 300 MG: 300 CAPSULE ORAL at 05:36

## 2019-09-27 RX ADMIN — IPRATROPIUM BROMIDE AND ALBUTEROL SULFATE 3 ML: 2.5; .5 SOLUTION RESPIRATORY (INHALATION) at 14:01

## 2019-09-27 RX ADMIN — FLUOXETINE 10 MG: 10 CAPSULE ORAL at 08:27

## 2019-09-27 RX ADMIN — POTASSIUM CHLORIDE 40 MEQ: 750 CAPSULE, EXTENDED RELEASE ORAL at 08:45

## 2019-09-27 RX ADMIN — SODIUM CHLORIDE, PRESERVATIVE FREE 10 ML: 5 INJECTION INTRAVENOUS at 08:28

## 2019-09-27 RX ADMIN — MONTELUKAST SODIUM 5 MG: 10 TABLET, FILM COATED ORAL at 08:28

## 2019-09-27 RX ADMIN — FUROSEMIDE 20 MG: 10 INJECTION, SOLUTION INTRAMUSCULAR; INTRAVENOUS at 18:11

## 2019-09-27 RX ADMIN — OXYCODONE HYDROCHLORIDE AND ACETAMINOPHEN 1 TABLET: 10; 325 TABLET ORAL at 12:34

## 2019-09-27 RX ADMIN — ALBUMIN HUMAN 25 G: 0.25 SOLUTION INTRAVENOUS at 10:23

## 2019-09-28 PROBLEM — I50.33 ACUTE ON CHRONIC DIASTOLIC CONGESTIVE HEART FAILURE (HCC): Status: ACTIVE | Noted: 2019-09-27

## 2019-09-28 PROBLEM — E43 SEVERE MALNUTRITION (HCC): Status: ACTIVE | Noted: 2019-09-28

## 2019-09-28 LAB
ANION GAP SERPL CALCULATED.3IONS-SCNC: 10 MMOL/L (ref 5–15)
BASOPHILS # BLD AUTO: 0.05 10*3/MM3 (ref 0–0.2)
BASOPHILS NFR BLD AUTO: 0.4 % (ref 0–1.5)
BUN BLD-MCNC: 31 MG/DL (ref 8–23)
BUN/CREAT SERPL: 38.3 (ref 7–25)
CALCIUM SPEC-SCNC: 8.4 MG/DL (ref 8.6–10.5)
CHLORIDE SERPL-SCNC: 100 MMOL/L (ref 98–107)
CO2 SERPL-SCNC: 31 MMOL/L (ref 22–29)
CREAT BLD-MCNC: 0.81 MG/DL (ref 0.57–1)
DEPRECATED RDW RBC AUTO: 51.8 FL (ref 37–54)
EOSINOPHIL # BLD AUTO: 0.22 10*3/MM3 (ref 0–0.4)
EOSINOPHIL NFR BLD AUTO: 1.7 % (ref 0.3–6.2)
ERYTHROCYTE [DISTWIDTH] IN BLOOD BY AUTOMATED COUNT: 14.1 % (ref 12.3–15.4)
GFR SERPL CREATININE-BSD FRML MDRD: 68 ML/MIN/1.73
GLUCOSE BLD-MCNC: 136 MG/DL (ref 65–99)
HCT VFR BLD AUTO: 40.7 % (ref 34–46.6)
HGB BLD-MCNC: 13.4 G/DL (ref 12–15.9)
IMM GRANULOCYTES # BLD AUTO: 0.04 10*3/MM3 (ref 0–0.05)
IMM GRANULOCYTES NFR BLD AUTO: 0.3 % (ref 0–0.5)
LYMPHOCYTES # BLD AUTO: 2.62 10*3/MM3 (ref 0.7–3.1)
LYMPHOCYTES NFR BLD AUTO: 20.5 % (ref 19.6–45.3)
MCH RBC QN AUTO: 32.7 PG (ref 26.6–33)
MCHC RBC AUTO-ENTMCNC: 32.9 G/DL (ref 31.5–35.7)
MCV RBC AUTO: 99.3 FL (ref 79–97)
MONOCYTES # BLD AUTO: 1.21 10*3/MM3 (ref 0.1–0.9)
MONOCYTES NFR BLD AUTO: 9.5 % (ref 5–12)
NEUTROPHILS # BLD AUTO: 8.66 10*3/MM3 (ref 1.7–7)
NEUTROPHILS NFR BLD AUTO: 67.6 % (ref 42.7–76)
NRBC BLD AUTO-RTO: 0 /100 WBC (ref 0–0.2)
PLATELET # BLD AUTO: 253 10*3/MM3 (ref 140–450)
PMV BLD AUTO: 10.2 FL (ref 6–12)
POTASSIUM BLD-SCNC: 4 MMOL/L (ref 3.5–5.2)
RBC # BLD AUTO: 4.1 10*6/MM3 (ref 3.77–5.28)
SODIUM BLD-SCNC: 141 MMOL/L (ref 136–145)
WBC NRBC COR # BLD: 12.8 10*3/MM3 (ref 3.4–10.8)

## 2019-09-28 PROCEDURE — 25010000002 FUROSEMIDE PER 20 MG: Performed by: FAMILY MEDICINE

## 2019-09-28 PROCEDURE — 25010000002 FUROSEMIDE PER 20 MG: Performed by: NURSE PRACTITIONER

## 2019-09-28 PROCEDURE — 94799 UNLISTED PULMONARY SVC/PX: CPT

## 2019-09-28 PROCEDURE — 80048 BASIC METABOLIC PNL TOTAL CA: CPT | Performed by: NURSE PRACTITIONER

## 2019-09-28 PROCEDURE — 94760 N-INVAS EAR/PLS OXIMETRY 1: CPT

## 2019-09-28 PROCEDURE — 97535 SELF CARE MNGMENT TRAINING: CPT

## 2019-09-28 PROCEDURE — 85025 COMPLETE CBC W/AUTO DIFF WBC: CPT | Performed by: NURSE PRACTITIONER

## 2019-09-28 RX ORDER — SIMETHICONE 80 MG
80 TABLET,CHEWABLE ORAL 4 TIMES DAILY PRN
Status: DISCONTINUED | OUTPATIENT
Start: 2019-09-28 | End: 2019-10-01 | Stop reason: HOSPADM

## 2019-09-28 RX ORDER — NICOTINE 21 MG/24HR
1 PATCH, TRANSDERMAL 24 HOURS TRANSDERMAL
Status: DISCONTINUED | OUTPATIENT
Start: 2019-09-28 | End: 2019-09-28

## 2019-09-28 RX ORDER — SENNA AND DOCUSATE SODIUM 50; 8.6 MG/1; MG/1
2 TABLET, FILM COATED ORAL NIGHTLY
Status: DISCONTINUED | OUTPATIENT
Start: 2019-09-28 | End: 2019-10-01 | Stop reason: HOSPADM

## 2019-09-28 RX ORDER — GUAIFENESIN 600 MG/1
1200 TABLET, EXTENDED RELEASE ORAL EVERY 12 HOURS SCHEDULED
Status: DISCONTINUED | OUTPATIENT
Start: 2019-09-28 | End: 2019-10-01 | Stop reason: HOSPADM

## 2019-09-28 RX ORDER — ACETAMINOPHEN 325 MG/1
650 TABLET ORAL EVERY 6 HOURS PRN
Status: DISCONTINUED | OUTPATIENT
Start: 2019-09-28 | End: 2019-10-01 | Stop reason: HOSPADM

## 2019-09-28 RX ORDER — FAMOTIDINE 20 MG/1
20 TABLET, FILM COATED ORAL DAILY
Status: DISCONTINUED | OUTPATIENT
Start: 2019-09-28 | End: 2019-09-29

## 2019-09-28 RX ORDER — ONDANSETRON 2 MG/ML
4 INJECTION INTRAMUSCULAR; INTRAVENOUS EVERY 6 HOURS PRN
Status: DISCONTINUED | OUTPATIENT
Start: 2019-09-28 | End: 2019-10-01 | Stop reason: HOSPADM

## 2019-09-28 RX ADMIN — SODIUM CHLORIDE, PRESERVATIVE FREE 10 ML: 5 INJECTION INTRAVENOUS at 21:14

## 2019-09-28 RX ADMIN — FLUOXETINE HYDROCHLORIDE 60 MG: 20 CAPSULE ORAL at 09:51

## 2019-09-28 RX ADMIN — SODIUM CHLORIDE, PRESERVATIVE FREE 10 ML: 5 INJECTION INTRAVENOUS at 09:52

## 2019-09-28 RX ADMIN — GUAIFENESIN 1200 MG: 600 TABLET, EXTENDED RELEASE ORAL at 14:34

## 2019-09-28 RX ADMIN — FUROSEMIDE 20 MG: 10 INJECTION, SOLUTION INTRAMUSCULAR; INTRAVENOUS at 17:15

## 2019-09-28 RX ADMIN — IPRATROPIUM BROMIDE AND ALBUTEROL SULFATE 3 ML: 2.5; .5 SOLUTION RESPIRATORY (INHALATION) at 14:38

## 2019-09-28 RX ADMIN — MONTELUKAST SODIUM 5 MG: 10 TABLET, FILM COATED ORAL at 09:51

## 2019-09-28 RX ADMIN — FAMOTIDINE 20 MG: 20 TABLET, FILM COATED ORAL at 14:35

## 2019-09-28 RX ADMIN — NICOTINE POLACRILEX 2 MG: 2 GUM, CHEWING BUCCAL at 14:35

## 2019-09-28 RX ADMIN — OXYCODONE HYDROCHLORIDE AND ACETAMINOPHEN 1 TABLET: 10; 325 TABLET ORAL at 14:35

## 2019-09-28 RX ADMIN — SENNOSIDES AND DOCUSATE SODIUM 2 TABLET: 8.6; 5 TABLET ORAL at 21:14

## 2019-09-28 RX ADMIN — GUAIFENESIN 1200 MG: 600 TABLET, EXTENDED RELEASE ORAL at 21:14

## 2019-09-28 RX ADMIN — GABAPENTIN 300 MG: 300 CAPSULE ORAL at 21:14

## 2019-09-28 RX ADMIN — SIMETHICONE 80 MG: 80 TABLET, CHEWABLE ORAL at 19:33

## 2019-09-28 RX ADMIN — FUROSEMIDE 20 MG: 10 INJECTION, SOLUTION INTRAMUSCULAR; INTRAVENOUS at 09:51

## 2019-09-28 RX ADMIN — IPRATROPIUM BROMIDE AND ALBUTEROL SULFATE 3 ML: 2.5; .5 SOLUTION RESPIRATORY (INHALATION) at 06:34

## 2019-09-28 RX ADMIN — IPRATROPIUM BROMIDE AND ALBUTEROL SULFATE 3 ML: 2.5; .5 SOLUTION RESPIRATORY (INHALATION) at 18:41

## 2019-09-28 RX ADMIN — ASPIRIN 81 MG: 81 TABLET, CHEWABLE ORAL at 09:51

## 2019-09-28 RX ADMIN — GABAPENTIN 300 MG: 300 CAPSULE ORAL at 05:41

## 2019-09-28 RX ADMIN — GABAPENTIN 300 MG: 300 CAPSULE ORAL at 14:35

## 2019-09-28 RX ADMIN — OXYCODONE HYDROCHLORIDE AND ACETAMINOPHEN 1 TABLET: 10; 325 TABLET ORAL at 05:41

## 2019-09-28 RX ADMIN — NICOTINE POLACRILEX 2 MG: 2 GUM, CHEWING BUCCAL at 19:14

## 2019-09-29 ENCOUNTER — APPOINTMENT (OUTPATIENT)
Dept: GENERAL RADIOLOGY | Facility: HOSPITAL | Age: 84
End: 2019-09-29

## 2019-09-29 PROBLEM — A41.9 SEPSIS, UNSPECIFIED ORGANISM (HCC): Status: ACTIVE | Noted: 2019-09-29

## 2019-09-29 LAB
ANION GAP SERPL CALCULATED.3IONS-SCNC: 8 MMOL/L (ref 5–15)
BACTERIA UR QL AUTO: ABNORMAL /HPF
BILIRUB UR QL STRIP: NEGATIVE
BILIRUB UR QL STRIP: NEGATIVE
BUN BLD-MCNC: 34 MG/DL (ref 8–23)
BUN/CREAT SERPL: 33.7 (ref 7–25)
CALCIUM SPEC-SCNC: 8.6 MG/DL (ref 8.6–10.5)
CHLORIDE SERPL-SCNC: 98 MMOL/L (ref 98–107)
CLARITY UR: ABNORMAL
CLARITY UR: CLEAR
CO2 SERPL-SCNC: 30 MMOL/L (ref 22–29)
COLOR UR: YELLOW
COLOR UR: YELLOW
CREAT BLD-MCNC: 1.01 MG/DL (ref 0.57–1)
D-LACTATE SERPL-SCNC: 2.1 MMOL/L (ref 0.5–2)
D-LACTATE SERPL-SCNC: 2.2 MMOL/L (ref 0.5–2)
DEPRECATED RDW RBC AUTO: 53.1 FL (ref 37–54)
ERYTHROCYTE [DISTWIDTH] IN BLOOD BY AUTOMATED COUNT: 14.3 % (ref 12.3–15.4)
GFR SERPL CREATININE-BSD FRML MDRD: 52 ML/MIN/1.73
GLUCOSE BLD-MCNC: 118 MG/DL (ref 65–99)
GLUCOSE UR STRIP-MCNC: NEGATIVE MG/DL
GLUCOSE UR STRIP-MCNC: NEGATIVE MG/DL
HCT VFR BLD AUTO: 41.6 % (ref 34–46.6)
HGB BLD-MCNC: 13.8 G/DL (ref 12–15.9)
HGB UR QL STRIP.AUTO: NEGATIVE
HGB UR QL STRIP.AUTO: NEGATIVE
HOLD SPECIMEN: NORMAL
HYALINE CASTS UR QL AUTO: ABNORMAL /LPF
KETONES UR QL STRIP: NEGATIVE
KETONES UR QL STRIP: NEGATIVE
LEUKOCYTE ESTERASE UR QL STRIP.AUTO: ABNORMAL
LEUKOCYTE ESTERASE UR QL STRIP.AUTO: NEGATIVE
MCH RBC QN AUTO: 33.3 PG (ref 26.6–33)
MCHC RBC AUTO-ENTMCNC: 33.2 G/DL (ref 31.5–35.7)
MCV RBC AUTO: 100.2 FL (ref 79–97)
NITRITE UR QL STRIP: NEGATIVE
NITRITE UR QL STRIP: NEGATIVE
PH UR STRIP.AUTO: <=5 [PH] (ref 5–8)
PH UR STRIP.AUTO: <=5 [PH] (ref 5–8)
PLATELET # BLD AUTO: 220 10*3/MM3 (ref 140–450)
PMV BLD AUTO: 10.8 FL (ref 6–12)
POTASSIUM BLD-SCNC: 4.7 MMOL/L (ref 3.5–5.2)
PROCALCITONIN SERPL-MCNC: 0.79 NG/ML (ref 0.1–0.25)
PROT UR QL STRIP: NEGATIVE
PROT UR QL STRIP: NEGATIVE
RBC # BLD AUTO: 4.15 10*6/MM3 (ref 3.77–5.28)
RBC # UR: ABNORMAL /HPF
REF LAB TEST METHOD: ABNORMAL
SODIUM BLD-SCNC: 136 MMOL/L (ref 136–145)
SP GR UR STRIP: 1.02 (ref 1–1.03)
SP GR UR STRIP: 1.02 (ref 1–1.03)
SQUAMOUS #/AREA URNS HPF: ABNORMAL /HPF
UROBILINOGEN UR QL STRIP: ABNORMAL
UROBILINOGEN UR QL STRIP: NORMAL
WBC NRBC COR # BLD: 13.88 10*3/MM3 (ref 3.4–10.8)
WBC UR QL AUTO: ABNORMAL /HPF

## 2019-09-29 PROCEDURE — 97110 THERAPEUTIC EXERCISES: CPT

## 2019-09-29 PROCEDURE — 83605 ASSAY OF LACTIC ACID: CPT | Performed by: NURSE PRACTITIONER

## 2019-09-29 PROCEDURE — 94799 UNLISTED PULMONARY SVC/PX: CPT

## 2019-09-29 PROCEDURE — 85027 COMPLETE CBC AUTOMATED: CPT | Performed by: INTERNAL MEDICINE

## 2019-09-29 PROCEDURE — 97116 GAIT TRAINING THERAPY: CPT

## 2019-09-29 PROCEDURE — 94640 AIRWAY INHALATION TREATMENT: CPT

## 2019-09-29 PROCEDURE — 84145 PROCALCITONIN (PCT): CPT | Performed by: NURSE PRACTITIONER

## 2019-09-29 PROCEDURE — 25010000002 CEFTRIAXONE PER 250 MG: Performed by: INTERNAL MEDICINE

## 2019-09-29 PROCEDURE — 25010000002 VANCOMYCIN 10 G RECONSTITUTED SOLUTION: Performed by: INTERNAL MEDICINE

## 2019-09-29 PROCEDURE — 71046 X-RAY EXAM CHEST 2 VIEWS: CPT

## 2019-09-29 PROCEDURE — 80048 BASIC METABOLIC PNL TOTAL CA: CPT | Performed by: NURSE PRACTITIONER

## 2019-09-29 PROCEDURE — 87086 URINE CULTURE/COLONY COUNT: CPT | Performed by: NURSE PRACTITIONER

## 2019-09-29 PROCEDURE — 94760 N-INVAS EAR/PLS OXIMETRY 1: CPT

## 2019-09-29 PROCEDURE — 81003 URINALYSIS AUTO W/O SCOPE: CPT | Performed by: NURSE PRACTITIONER

## 2019-09-29 PROCEDURE — 81001 URINALYSIS AUTO W/SCOPE: CPT | Performed by: NURSE PRACTITIONER

## 2019-09-29 PROCEDURE — 87040 BLOOD CULTURE FOR BACTERIA: CPT | Performed by: NURSE PRACTITIONER

## 2019-09-29 RX ORDER — FAMOTIDINE 20 MG/1
20 TABLET, FILM COATED ORAL
Status: DISCONTINUED | OUTPATIENT
Start: 2019-09-29 | End: 2019-10-01 | Stop reason: HOSPADM

## 2019-09-29 RX ORDER — SUCRALFATE ORAL 1 G/10ML
1 SUSPENSION ORAL
Status: DISCONTINUED | OUTPATIENT
Start: 2019-09-29 | End: 2019-10-01 | Stop reason: HOSPADM

## 2019-09-29 RX ORDER — OXYCODONE HYDROCHLORIDE AND ACETAMINOPHEN 5; 325 MG/1; MG/1
1 TABLET ORAL EVERY 8 HOURS PRN
Status: DISCONTINUED | OUTPATIENT
Start: 2019-09-29 | End: 2019-10-01 | Stop reason: HOSPADM

## 2019-09-29 RX ADMIN — GABAPENTIN 300 MG: 300 CAPSULE ORAL at 06:12

## 2019-09-29 RX ADMIN — SODIUM CHLORIDE, POTASSIUM CHLORIDE, SODIUM LACTATE AND CALCIUM CHLORIDE 500 ML: 600; 310; 30; 20 INJECTION, SOLUTION INTRAVENOUS at 16:00

## 2019-09-29 RX ADMIN — GUAIFENESIN 1200 MG: 600 TABLET, EXTENDED RELEASE ORAL at 09:10

## 2019-09-29 RX ADMIN — SODIUM CHLORIDE 500 ML: 9 INJECTION, SOLUTION INTRAVENOUS at 11:22

## 2019-09-29 RX ADMIN — IPRATROPIUM BROMIDE AND ALBUTEROL SULFATE 3 ML: 2.5; .5 SOLUTION RESPIRATORY (INHALATION) at 11:52

## 2019-09-29 RX ADMIN — ACETAMINOPHEN 650 MG: 325 TABLET, FILM COATED ORAL at 11:21

## 2019-09-29 RX ADMIN — FLUOXETINE HYDROCHLORIDE 60 MG: 20 CAPSULE ORAL at 09:09

## 2019-09-29 RX ADMIN — IPRATROPIUM BROMIDE AND ALBUTEROL SULFATE 3 ML: 2.5; .5 SOLUTION RESPIRATORY (INHALATION) at 06:33

## 2019-09-29 RX ADMIN — SUCRALFATE 1 G: 1 SUSPENSION ORAL at 21:49

## 2019-09-29 RX ADMIN — SENNOSIDES AND DOCUSATE SODIUM 2 TABLET: 8.6; 5 TABLET ORAL at 21:49

## 2019-09-29 RX ADMIN — MONTELUKAST SODIUM 5 MG: 10 TABLET, FILM COATED ORAL at 09:10

## 2019-09-29 RX ADMIN — ACETAMINOPHEN 650 MG: 325 TABLET, FILM COATED ORAL at 04:25

## 2019-09-29 RX ADMIN — IPRATROPIUM BROMIDE AND ALBUTEROL SULFATE 3 ML: 2.5; .5 SOLUTION RESPIRATORY (INHALATION) at 19:39

## 2019-09-29 RX ADMIN — SUCRALFATE 1 G: 1 SUSPENSION ORAL at 16:46

## 2019-09-29 RX ADMIN — SODIUM CHLORIDE, PRESERVATIVE FREE 10 ML: 5 INJECTION INTRAVENOUS at 21:49

## 2019-09-29 RX ADMIN — FAMOTIDINE 20 MG: 20 TABLET, FILM COATED ORAL at 16:46

## 2019-09-29 RX ADMIN — SODIUM CHLORIDE, PRESERVATIVE FREE 10 ML: 5 INJECTION INTRAVENOUS at 09:10

## 2019-09-29 RX ADMIN — CEFTRIAXONE SODIUM 1 G: 1 INJECTION, POWDER, FOR SOLUTION INTRAMUSCULAR; INTRAVENOUS at 11:21

## 2019-09-29 RX ADMIN — SIMETHICONE 80 MG: 80 TABLET, CHEWABLE ORAL at 11:23

## 2019-09-29 RX ADMIN — GUAIFENESIN 1200 MG: 600 TABLET, EXTENDED RELEASE ORAL at 21:49

## 2019-09-29 RX ADMIN — SUCRALFATE 1 G: 1 SUSPENSION ORAL at 11:23

## 2019-09-29 RX ADMIN — VANCOMYCIN HYDROCHLORIDE 750 MG: 10 INJECTION, POWDER, LYOPHILIZED, FOR SOLUTION INTRAVENOUS at 13:08

## 2019-09-29 RX ADMIN — FAMOTIDINE 20 MG: 20 TABLET, FILM COATED ORAL at 06:28

## 2019-09-29 RX ADMIN — ASPIRIN 81 MG: 81 TABLET, CHEWABLE ORAL at 09:09

## 2019-09-29 RX ADMIN — ACETAMINOPHEN 650 MG: 325 TABLET, FILM COATED ORAL at 16:46

## 2019-09-30 LAB
ALBUMIN SERPL-MCNC: 2.9 G/DL (ref 3.5–5.2)
ALBUMIN/GLOB SERPL: 1.1 G/DL
ALP SERPL-CCNC: 143 U/L (ref 39–117)
ALT SERPL W P-5'-P-CCNC: 65 U/L (ref 1–33)
ANION GAP SERPL CALCULATED.3IONS-SCNC: 7 MMOL/L (ref 5–15)
AST SERPL-CCNC: 105 U/L (ref 1–32)
BACTERIA SPEC AEROBE CULT: NORMAL
BASOPHILS # BLD AUTO: 0.03 10*3/MM3 (ref 0–0.2)
BASOPHILS NFR BLD AUTO: 0.3 % (ref 0–1.5)
BILIRUB SERPL-MCNC: 0.2 MG/DL (ref 0.2–1.2)
BUN BLD-MCNC: 34 MG/DL (ref 8–23)
BUN/CREAT SERPL: 39.1 (ref 7–25)
CALCIUM SPEC-SCNC: 8.4 MG/DL (ref 8.6–10.5)
CHLORIDE SERPL-SCNC: 99 MMOL/L (ref 98–107)
CO2 SERPL-SCNC: 30 MMOL/L (ref 22–29)
CREAT BLD-MCNC: 0.87 MG/DL (ref 0.57–1)
D-LACTATE SERPL-SCNC: 1 MMOL/L (ref 0.5–2)
DEPRECATED RDW RBC AUTO: 53.8 FL (ref 37–54)
EOSINOPHIL # BLD AUTO: 0.22 10*3/MM3 (ref 0–0.4)
EOSINOPHIL NFR BLD AUTO: 1.9 % (ref 0.3–6.2)
ERYTHROCYTE [DISTWIDTH] IN BLOOD BY AUTOMATED COUNT: 14.3 % (ref 12.3–15.4)
GFR SERPL CREATININE-BSD FRML MDRD: 62 ML/MIN/1.73
GLOBULIN UR ELPH-MCNC: 2.7 GM/DL
GLUCOSE BLD-MCNC: 116 MG/DL (ref 65–99)
HCT VFR BLD AUTO: 35.5 % (ref 34–46.6)
HGB BLD-MCNC: 11.7 G/DL (ref 12–15.9)
IMM GRANULOCYTES # BLD AUTO: 0.05 10*3/MM3 (ref 0–0.05)
IMM GRANULOCYTES NFR BLD AUTO: 0.4 % (ref 0–0.5)
L PNEUMO1 AG UR QL IA: NEGATIVE
LYMPHOCYTES # BLD AUTO: 1.88 10*3/MM3 (ref 0.7–3.1)
LYMPHOCYTES NFR BLD AUTO: 16.1 % (ref 19.6–45.3)
MCH RBC QN AUTO: 33.8 PG (ref 26.6–33)
MCHC RBC AUTO-ENTMCNC: 33 G/DL (ref 31.5–35.7)
MCV RBC AUTO: 102.6 FL (ref 79–97)
MONOCYTES # BLD AUTO: 0.75 10*3/MM3 (ref 0.1–0.9)
MONOCYTES NFR BLD AUTO: 6.4 % (ref 5–12)
NEUTROPHILS # BLD AUTO: 8.72 10*3/MM3 (ref 1.7–7)
NEUTROPHILS NFR BLD AUTO: 74.9 % (ref 42.7–76)
NRBC BLD AUTO-RTO: 0 /100 WBC (ref 0–0.2)
PLATELET # BLD AUTO: 185 10*3/MM3 (ref 140–450)
PMV BLD AUTO: 10.1 FL (ref 6–12)
POTASSIUM BLD-SCNC: 4.5 MMOL/L (ref 3.5–5.2)
PROT SERPL-MCNC: 5.6 G/DL (ref 6–8.5)
RBC # BLD AUTO: 3.46 10*6/MM3 (ref 3.77–5.28)
S PNEUM AG SPEC QL LA: NEGATIVE
SODIUM BLD-SCNC: 136 MMOL/L (ref 136–145)
WBC NRBC COR # BLD: 11.65 10*3/MM3 (ref 3.4–10.8)

## 2019-09-30 PROCEDURE — 94799 UNLISTED PULMONARY SVC/PX: CPT

## 2019-09-30 PROCEDURE — 85025 COMPLETE CBC W/AUTO DIFF WBC: CPT | Performed by: NURSE PRACTITIONER

## 2019-09-30 PROCEDURE — 97116 GAIT TRAINING THERAPY: CPT

## 2019-09-30 PROCEDURE — 97110 THERAPEUTIC EXERCISES: CPT

## 2019-09-30 PROCEDURE — 80053 COMPREHEN METABOLIC PANEL: CPT | Performed by: NURSE PRACTITIONER

## 2019-09-30 PROCEDURE — 25010000002 CEFTRIAXONE PER 250 MG: Performed by: INTERNAL MEDICINE

## 2019-09-30 PROCEDURE — 25010000002 AZITHROMYCIN PER 500 MG: Performed by: NURSE PRACTITIONER

## 2019-09-30 PROCEDURE — 87899 AGENT NOS ASSAY W/OPTIC: CPT | Performed by: NURSE PRACTITIONER

## 2019-09-30 PROCEDURE — 83605 ASSAY OF LACTIC ACID: CPT | Performed by: NURSE PRACTITIONER

## 2019-09-30 PROCEDURE — 94760 N-INVAS EAR/PLS OXIMETRY 1: CPT

## 2019-09-30 RX ORDER — ARIPIPRAZOLE 2 MG/1
2 TABLET ORAL NIGHTLY
Status: DISCONTINUED | OUTPATIENT
Start: 2019-09-30 | End: 2019-10-01 | Stop reason: HOSPADM

## 2019-09-30 RX ORDER — HYDROXYZINE HYDROCHLORIDE 25 MG/1
25 TABLET, FILM COATED ORAL 3 TIMES DAILY PRN
Status: DISCONTINUED | OUTPATIENT
Start: 2019-09-30 | End: 2019-10-01 | Stop reason: HOSPADM

## 2019-09-30 RX ORDER — IPRATROPIUM BROMIDE AND ALBUTEROL SULFATE 2.5; .5 MG/3ML; MG/3ML
3 SOLUTION RESPIRATORY (INHALATION) EVERY 4 HOURS PRN
Status: DISCONTINUED | OUTPATIENT
Start: 2019-09-30 | End: 2019-10-01 | Stop reason: HOSPADM

## 2019-09-30 RX ORDER — SODIUM CHLORIDE 9 MG/ML
100 INJECTION, SOLUTION INTRAVENOUS CONTINUOUS
Status: DISPENSED | OUTPATIENT
Start: 2019-09-30 | End: 2019-10-01

## 2019-09-30 RX ADMIN — SUCRALFATE 1 G: 1 SUSPENSION ORAL at 08:47

## 2019-09-30 RX ADMIN — ARIPIPRAZOLE 2 MG: 2 TABLET ORAL at 21:14

## 2019-09-30 RX ADMIN — GABAPENTIN 300 MG: 300 CAPSULE ORAL at 21:14

## 2019-09-30 RX ADMIN — FAMOTIDINE 20 MG: 20 TABLET, FILM COATED ORAL at 08:46

## 2019-09-30 RX ADMIN — SUCRALFATE 1 G: 1 SUSPENSION ORAL at 12:18

## 2019-09-30 RX ADMIN — OXYCODONE HYDROCHLORIDE AND ACETAMINOPHEN 1 TABLET: 5; 325 TABLET ORAL at 20:20

## 2019-09-30 RX ADMIN — GABAPENTIN 300 MG: 300 CAPSULE ORAL at 06:54

## 2019-09-30 RX ADMIN — IPRATROPIUM BROMIDE AND ALBUTEROL SULFATE 3 ML: 2.5; .5 SOLUTION RESPIRATORY (INHALATION) at 18:55

## 2019-09-30 RX ADMIN — SODIUM CHLORIDE, PRESERVATIVE FREE 10 ML: 5 INJECTION INTRAVENOUS at 08:47

## 2019-09-30 RX ADMIN — SENNOSIDES AND DOCUSATE SODIUM 2 TABLET: 8.6; 5 TABLET ORAL at 21:14

## 2019-09-30 RX ADMIN — IPRATROPIUM BROMIDE AND ALBUTEROL SULFATE 3 ML: 2.5; .5 SOLUTION RESPIRATORY (INHALATION) at 06:51

## 2019-09-30 RX ADMIN — GUAIFENESIN 1200 MG: 600 TABLET, EXTENDED RELEASE ORAL at 08:46

## 2019-09-30 RX ADMIN — HYDROXYZINE HYDROCHLORIDE 25 MG: 25 TABLET, FILM COATED ORAL at 16:44

## 2019-09-30 RX ADMIN — FLUOXETINE HYDROCHLORIDE 60 MG: 20 CAPSULE ORAL at 08:46

## 2019-09-30 RX ADMIN — GUAIFENESIN 1200 MG: 600 TABLET, EXTENDED RELEASE ORAL at 21:14

## 2019-09-30 RX ADMIN — FAMOTIDINE 20 MG: 20 TABLET, FILM COATED ORAL at 20:01

## 2019-09-30 RX ADMIN — AZITHROMYCIN MONOHYDRATE 500 MG: 500 INJECTION, POWDER, LYOPHILIZED, FOR SOLUTION INTRAVENOUS at 16:44

## 2019-09-30 RX ADMIN — SODIUM CHLORIDE 100 ML/HR: 9 INJECTION, SOLUTION INTRAVENOUS at 16:45

## 2019-09-30 RX ADMIN — CEFTRIAXONE SODIUM 1 G: 1 INJECTION, POWDER, FOR SOLUTION INTRAMUSCULAR; INTRAVENOUS at 12:18

## 2019-09-30 RX ADMIN — SUCRALFATE 1 G: 1 SUSPENSION ORAL at 21:14

## 2019-09-30 RX ADMIN — MONTELUKAST SODIUM 5 MG: 10 TABLET, FILM COATED ORAL at 08:46

## 2019-09-30 RX ADMIN — SUCRALFATE 1 G: 1 SUSPENSION ORAL at 16:45

## 2019-09-30 RX ADMIN — ASPIRIN 81 MG: 81 TABLET, CHEWABLE ORAL at 08:46

## 2019-10-01 VITALS
WEIGHT: 111.2 LBS | TEMPERATURE: 98.5 F | HEIGHT: 60 IN | DIASTOLIC BLOOD PRESSURE: 48 MMHG | BODY MASS INDEX: 21.83 KG/M2 | HEART RATE: 62 BPM | SYSTOLIC BLOOD PRESSURE: 125 MMHG | RESPIRATION RATE: 16 BRPM | OXYGEN SATURATION: 98 %

## 2019-10-01 LAB
ALBUMIN SERPL-MCNC: 2.9 G/DL (ref 3.5–5.2)
ALBUMIN/GLOB SERPL: 1.1 G/DL
ALP SERPL-CCNC: 132 U/L (ref 39–117)
ALT SERPL W P-5'-P-CCNC: 45 U/L (ref 1–33)
ANION GAP SERPL CALCULATED.3IONS-SCNC: 9 MMOL/L (ref 5–15)
AST SERPL-CCNC: 47 U/L (ref 1–32)
BASOPHILS # BLD AUTO: 0.03 10*3/MM3 (ref 0–0.2)
BASOPHILS NFR BLD AUTO: 0.5 % (ref 0–1.5)
BILIRUB SERPL-MCNC: <0.2 MG/DL (ref 0.2–1.2)
BUN BLD-MCNC: 21 MG/DL (ref 8–23)
BUN/CREAT SERPL: 30.9 (ref 7–25)
CALCIUM SPEC-SCNC: 8.3 MG/DL (ref 8.6–10.5)
CHLORIDE SERPL-SCNC: 106 MMOL/L (ref 98–107)
CO2 SERPL-SCNC: 26 MMOL/L (ref 22–29)
CREAT BLD-MCNC: 0.68 MG/DL (ref 0.57–1)
DEPRECATED RDW RBC AUTO: 54.4 FL (ref 37–54)
EOSINOPHIL # BLD AUTO: 0.24 10*3/MM3 (ref 0–0.4)
EOSINOPHIL NFR BLD AUTO: 3.9 % (ref 0.3–6.2)
ERYTHROCYTE [DISTWIDTH] IN BLOOD BY AUTOMATED COUNT: 14.4 % (ref 12.3–15.4)
GFR SERPL CREATININE-BSD FRML MDRD: 83 ML/MIN/1.73
GLOBULIN UR ELPH-MCNC: 2.6 GM/DL
GLUCOSE BLD-MCNC: 108 MG/DL (ref 65–99)
HCT VFR BLD AUTO: 32.9 % (ref 34–46.6)
HGB BLD-MCNC: 10.6 G/DL (ref 12–15.9)
IMM GRANULOCYTES # BLD AUTO: 0.05 10*3/MM3 (ref 0–0.05)
IMM GRANULOCYTES NFR BLD AUTO: 0.8 % (ref 0–0.5)
LYMPHOCYTES # BLD AUTO: 1.64 10*3/MM3 (ref 0.7–3.1)
LYMPHOCYTES NFR BLD AUTO: 26.8 % (ref 19.6–45.3)
MCH RBC QN AUTO: 33.3 PG (ref 26.6–33)
MCHC RBC AUTO-ENTMCNC: 32.2 G/DL (ref 31.5–35.7)
MCV RBC AUTO: 103.5 FL (ref 79–97)
MONOCYTES # BLD AUTO: 0.56 10*3/MM3 (ref 0.1–0.9)
MONOCYTES NFR BLD AUTO: 9.2 % (ref 5–12)
NEUTROPHILS # BLD AUTO: 3.6 10*3/MM3 (ref 1.7–7)
NEUTROPHILS NFR BLD AUTO: 58.8 % (ref 42.7–76)
NRBC BLD AUTO-RTO: 0 /100 WBC (ref 0–0.2)
PLATELET # BLD AUTO: 198 10*3/MM3 (ref 140–450)
PMV BLD AUTO: 10.6 FL (ref 6–12)
POTASSIUM BLD-SCNC: 4 MMOL/L (ref 3.5–5.2)
PROT SERPL-MCNC: 5.5 G/DL (ref 6–8.5)
RBC # BLD AUTO: 3.18 10*6/MM3 (ref 3.77–5.28)
SODIUM BLD-SCNC: 141 MMOL/L (ref 136–145)
WBC NRBC COR # BLD: 6.12 10*3/MM3 (ref 3.4–10.8)

## 2019-10-01 PROCEDURE — 80053 COMPREHEN METABOLIC PANEL: CPT | Performed by: NURSE PRACTITIONER

## 2019-10-01 PROCEDURE — 85025 COMPLETE CBC W/AUTO DIFF WBC: CPT | Performed by: NURSE PRACTITIONER

## 2019-10-01 PROCEDURE — 97116 GAIT TRAINING THERAPY: CPT

## 2019-10-01 PROCEDURE — 94799 UNLISTED PULMONARY SVC/PX: CPT

## 2019-10-01 PROCEDURE — 97110 THERAPEUTIC EXERCISES: CPT

## 2019-10-01 PROCEDURE — 25010000002 CEFTRIAXONE PER 250 MG: Performed by: INTERNAL MEDICINE

## 2019-10-01 PROCEDURE — 94760 N-INVAS EAR/PLS OXIMETRY 1: CPT

## 2019-10-01 RX ORDER — AZITHROMYCIN 500 MG/1
500 TABLET, FILM COATED ORAL DAILY
Qty: 2 TABLET | Refills: 0
Start: 2019-10-01 | End: 2019-10-03

## 2019-10-01 RX ORDER — CEFDINIR 300 MG/1
300 CAPSULE ORAL 2 TIMES DAILY
Qty: 8 CAPSULE | Refills: 0
Start: 2019-10-02 | End: 2019-10-06

## 2019-10-01 RX ORDER — HYDROXYZINE HYDROCHLORIDE 25 MG/1
25 TABLET, FILM COATED ORAL 3 TIMES DAILY PRN
Start: 2019-10-01

## 2019-10-01 RX ORDER — CARVEDILOL 3.12 MG/1
3.12 TABLET ORAL 2 TIMES DAILY WITH MEALS
Start: 2019-10-01 | End: 2019-10-31

## 2019-10-01 RX ORDER — GUAIFENESIN 600 MG/1
1200 TABLET, EXTENDED RELEASE ORAL EVERY 12 HOURS SCHEDULED
Start: 2019-10-01

## 2019-10-01 RX ORDER — SENNA AND DOCUSATE SODIUM 50; 8.6 MG/1; MG/1
2 TABLET, FILM COATED ORAL NIGHTLY
Start: 2019-10-01

## 2019-10-01 RX ORDER — GABAPENTIN 300 MG/1
300 CAPSULE ORAL 3 TIMES DAILY
Qty: 9 CAPSULE | Refills: 0 | Status: SHIPPED | OUTPATIENT
Start: 2019-10-01

## 2019-10-01 RX ORDER — OXYCODONE AND ACETAMINOPHEN 10; 325 MG/1; MG/1
1 TABLET ORAL 3 TIMES DAILY PRN
Qty: 9 TABLET | Refills: 0 | Status: SHIPPED | OUTPATIENT
Start: 2019-10-01

## 2019-10-01 RX ORDER — RANITIDINE 150 MG/1
150 TABLET ORAL NIGHTLY
Start: 2019-10-01

## 2019-10-01 RX ADMIN — HYDROXYZINE HYDROCHLORIDE 25 MG: 25 TABLET, FILM COATED ORAL at 13:36

## 2019-10-01 RX ADMIN — ASPIRIN 81 MG: 81 TABLET, CHEWABLE ORAL at 10:04

## 2019-10-01 RX ADMIN — SODIUM CHLORIDE, PRESERVATIVE FREE 10 ML: 5 INJECTION INTRAVENOUS at 11:17

## 2019-10-01 RX ADMIN — MONTELUKAST SODIUM 5 MG: 10 TABLET, FILM COATED ORAL at 10:03

## 2019-10-01 RX ADMIN — FAMOTIDINE 20 MG: 20 TABLET, FILM COATED ORAL at 05:24

## 2019-10-01 RX ADMIN — IPRATROPIUM BROMIDE AND ALBUTEROL SULFATE 3 ML: 2.5; .5 SOLUTION RESPIRATORY (INHALATION) at 05:39

## 2019-10-01 RX ADMIN — CEFTRIAXONE SODIUM 1 G: 1 INJECTION, POWDER, FOR SOLUTION INTRAMUSCULAR; INTRAVENOUS at 11:17

## 2019-10-01 RX ADMIN — SUCRALFATE 1 G: 1 SUSPENSION ORAL at 10:03

## 2019-10-01 RX ADMIN — IPRATROPIUM BROMIDE AND ALBUTEROL SULFATE 3 ML: 2.5; .5 SOLUTION RESPIRATORY (INHALATION) at 11:52

## 2019-10-01 RX ADMIN — GABAPENTIN 300 MG: 300 CAPSULE ORAL at 13:34

## 2019-10-01 RX ADMIN — GABAPENTIN 300 MG: 300 CAPSULE ORAL at 05:24

## 2019-10-01 RX ADMIN — OXYCODONE HYDROCHLORIDE AND ACETAMINOPHEN 1 TABLET: 5; 325 TABLET ORAL at 05:58

## 2019-10-01 RX ADMIN — OXYCODONE HYDROCHLORIDE AND ACETAMINOPHEN 1 TABLET: 5; 325 TABLET ORAL at 13:34

## 2019-10-01 RX ADMIN — FLUOXETINE HYDROCHLORIDE 60 MG: 20 CAPSULE ORAL at 10:04

## 2019-10-01 RX ADMIN — GUAIFENESIN 1200 MG: 600 TABLET, EXTENDED RELEASE ORAL at 10:04

## 2019-10-01 NOTE — DISCHARGE SUMMARY
[facility] Medicine Services  DISCHARGE SUMMARY       Date of Admission: 9/26/2019  Date of Discharge:  10/1/2019  Primary Care Physician: Juancarlos Dotson DO    Presenting Problem/History of Present Illness:  Shortness of breath, chest pain    Final Discharge Diagnoses:  Active Hospital Problems    Diagnosis   • **Acute on chronic diastolic congestive heart failure (CMS/HCC)   • Sepsis, unspecified organism (CMS/HCC)   • Severe malnutrition (CMS/HCC)   • Chest pain   • Back pain   • Falling   • Vasovagal syncope   • Personal history of nicotine dependence   • Tobacco user   • Shortness of breath   • COPD (chronic obstructive pulmonary disease) (CMS/HCC)     Without exacerbation       Consults: None    Procedures Performed: None    Pertinent Test Results:   Lab Results (all)     Procedure Component Value Units Date/Time    Comprehensive Metabolic Panel [[id number]]  (Abnormal) Collected:  10/01/19 0912    Specimen:  Blood Updated:  10/01/19 1136     Glucose 108 mg/dL      BUN 21 mg/dL      Creatinine 0.68 mg/dL      Sodium 141 mmol/L      Potassium 4.0 mmol/L      Chloride 106 mmol/L      CO2 26.0 mmol/L      Calcium 8.3 mg/dL      Total Protein 5.5 g/dL      Albumin 2.90 g/dL      ALT (SGPT) 45 U/L      AST (SGOT) 47 U/L      Alkaline Phosphatase 132 U/L      Total Bilirubin <0.2 mg/dL      eGFR Non African Amer 83 mL/min/1.73      Globulin 2.6 gm/dL      A/G Ratio 1.1 g/dL      BUN/Creatinine Ratio 30.9     Anion Gap 9.0 mmol/L     Blood Culture - Blood, Hand, Left [[id number]] Collected:  09/29/19 0940    Specimen:  Blood from Hand, Left Updated:  10/01/19 1016     Blood Culture No growth at 2 days    Blood Culture - Blood, Arm, Right [[id number]] Collected:  09/29/19 0940    Specimen:  Blood from Arm, Right Updated:  10/01/19 1016     Blood Culture No growth at 2 days    CBC Auto Differential [[id number]]  (Abnormal) Collected:  10/01/19 0912    Specimen:  Blood Updated:   10/01/19 0959     WBC 6.12 10*3/mm3      RBC 3.18 10*6/mm3      Hemoglobin 10.6 g/dL      Hematocrit 32.9 %      .5 fL      MCH 33.3 pg      MCHC 32.2 g/dL      RDW 14.4 %      RDW-SD 54.4 fl      MPV 10.6 fL      Platelets 198 10*3/mm3      Neutrophil % 58.8 %      Lymphocyte % 26.8 %      Monocyte % 9.2 %      Eosinophil % 3.9 %      Basophil % 0.5 %      Immature Grans % 0.8 %      Neutrophils, Absolute 3.60 10*3/mm3      Lymphocytes, Absolute 1.64 10*3/mm3      Monocytes, Absolute 0.56 10*3/mm3      Eosinophils, Absolute 0.24 10*3/mm3      Basophils, Absolute 0.03 10*3/mm3      Immature Grans, Absolute 0.05 10*3/mm3      nRBC 0.0 /100 WBC     S. Pneumo Ag Urine or CSF - Urine, Urine, Clean Catch [[id number]]  (Normal) Collected:  09/30/19 1835    Specimen:  Urine, Clean Catch Updated:  09/30/19 1906     Strep Pneumo Ag Negative    Legionella Antigen, Urine - Urine, Urine, Clean Catch [[id number]]  (Normal) Collected:  09/30/19 1835    Specimen:  Urine, Clean Catch Updated:  09/30/19 1905     LEGIONELLA ANTIGEN, URINE Negative    Urine Culture - Urine, Urine, Clean Catch [[id number]] Collected:  09/29/19 0935    Specimen:  Urine, Clean Catch Updated:  09/30/19 1354     Urine Culture 25,000 CFU/mL Normal Urogenital Millie    Comprehensive Metabolic Panel [[id number]]  (Abnormal) Collected:  09/30/19 0313    Specimen:  Blood Updated:  09/30/19 0347     Glucose 116 mg/dL      BUN 34 mg/dL      Creatinine 0.87 mg/dL      Sodium 136 mmol/L      Potassium 4.5 mmol/L      Chloride 99 mmol/L      CO2 30.0 mmol/L      Calcium 8.4 mg/dL      Total Protein 5.6 g/dL      Albumin 2.90 g/dL      ALT (SGPT) 65 U/L      AST (SGOT) 105 U/L      Alkaline Phosphatase 143 U/L      Total Bilirubin 0.2 mg/dL      eGFR Non African Amer 62 mL/min/1.73      Globulin 2.7 gm/dL      A/G Ratio 1.1 g/dL      BUN/Creatinine Ratio 39.1    Lactic Acid, Plasma [[id number]]  (Normal) Collected:  09/30/19 0313    Specimen:  Blood Updated:   09/30/19 0342     Lactate 1.0 mmol/L     CBC Auto Differential [[id number]]  (Abnormal) Collected:  09/30/19 0313    Specimen:  Blood Updated:  09/30/19 0324     WBC 11.65 10*3/mm3      RBC 3.46 10*6/mm3      Hemoglobin 11.7 g/dL      Hematocrit 35.5 %      .6 fL      MCH 33.8 pg      MCHC 33.0 g/dL      RDW 14.3 %      RDW-SD 53.8 fl      MPV 10.1 fL      Platelets 185 10*3/mm3      Neutrophil % 74.9 %      Lymphocyte % 16.1 %      Monocyte % 6.4 %      Eosinophil % 1.9 %      Basophil % 0.3 %      Immature Grans % 0.4 %      Neutrophils, Absolute 8.72 10*3/mm3      Lymphocytes, Absolute 1.88 10*3/mm3      Monocytes, Absolute 0.75 10*3/mm3      Eosinophils, Absolute 0.22 10*3/mm3      Basophils, Absolute 0.03 10*3/mm3      Immature Grans, Absolute 0.05 10*3/mm3      nRBC 0.0 /100 WBC     Lactic Acid, Reflex [[id number]]  (Abnormal) Collected:  09/29/19 1354    Specimen:  Blood Updated:  09/29/19 1426     Lactate 2.1 mmol/L     Urinalysis With Microscopic If Indicated (No Culture) - Urine, Catheter [[id number]]  (Normal) Collected:  09/29/19 1326    Specimen:  Urine, Catheter Updated:  09/29/19 1334     Color, UA Yellow     Appearance, UA Clear     pH, UA <=5.0     Specific Gravity, UA 1.022     Glucose, UA Negative     Ketones, UA Negative     Bilirubin, UA Negative     Blood, UA Negative     Protein, UA Negative     Leuk Esterase, UA Negative     Nitrite, UA Negative     Urobilinogen, UA 0.2 E.U./dL    Procalcitonin [[id number]]  (Abnormal) Collected:  09/29/19 0940    Specimen:  Blood Updated:  09/29/19 1032     Procalcitonin 0.79 ng/mL     Lactic Acid, Plasma [[id number]]  (Abnormal) Collected:  09/29/19 0940    Specimen:  Blood Updated:  09/29/19 1030     Lactate 2.2 mmol/L     Urinalysis With Culture If Indicated - Urine, Clean Catch [[id number]]  (Abnormal) Collected:  09/29/19 0935    Specimen:  Urine, Clean Catch Updated:  09/29/19 0949     Color, UA Yellow     Appearance, UA Cloudy     pH, UA <=5.0      Specific Gravity, UA 1.023     Glucose, UA Negative     Ketones, UA Negative     Bilirubin, UA Negative     Blood, UA Negative     Protein, UA Negative     Leuk Esterase, UA Small (1+)     Nitrite, UA Negative     Urobilinogen, UA 0.2 E.U./dL    Urinalysis, Microscopic Only - Urine, Clean Catch [[id number]]  (Abnormal) Collected:  09/29/19 0935    Specimen:  Urine, Clean Catch Updated:  09/29/19 0949     RBC, UA 3-5 /HPF      WBC, UA 13-20 /HPF      Bacteria, UA 2+ /HPF      Squamous Epithelial Cells, UA 13-20 /HPF      Hyaline Casts, UA 3-6 /LPF      Methodology Automated Microscopy    Basic Metabolic Panel [[id number]]  (Abnormal) Collected:  09/29/19 0422    Specimen:  Blood Updated:  09/29/19 0506     Glucose 118 mg/dL      BUN 34 mg/dL      Creatinine 1.01 mg/dL      Sodium 136 mmol/L      Potassium 4.7 mmol/L      Chloride 98 mmol/L      CO2 30.0 mmol/L      Calcium 8.6 mg/dL      eGFR Non African Amer 52 mL/min/1.73      BUN/Creatinine Ratio 33.7     Anion Gap 8.0 mmol/L     CBC (No Diff) [[id number]]  (Abnormal) Collected:  09/29/19 0422    Specimen:  Blood Updated:  09/29/19 0459     WBC 13.88 10*3/mm3      RBC 4.15 10*6/mm3      Hemoglobin 13.8 g/dL      Hematocrit 41.6 %      .2 fL      MCH 33.3 pg      MCHC 33.2 g/dL      RDW 14.3 %      RDW-SD 53.1 fl      MPV 10.8 fL      Platelets 220 10*3/mm3     Basic Metabolic Panel [[id number]]  (Abnormal) Collected:  09/28/19 0358    Specimen:  Blood Updated:  09/28/19 0447     Glucose 136 mg/dL      BUN 31 mg/dL      Creatinine 0.81 mg/dL      Sodium 141 mmol/L      Potassium 4.0 mmol/L      Chloride 100 mmol/L      CO2 31.0 mmol/L      Calcium 8.4 mg/dL      eGFR Non African Amer 68 mL/min/1.73      BUN/Creatinine Ratio 38.3     Anion Gap 10.0 mmol/L     CBC Auto Differential [[id number]]  (Abnormal) Collected:  09/28/19 0358    Specimen:  Blood Updated:  09/28/19 0422     WBC 12.80 10*3/mm3      RBC 4.10 10*6/mm3      Hemoglobin 13.4 g/dL      Hematocrit  40.7 %      MCV 99.3 fL      MCH 32.7 pg      MCHC 32.9 g/dL      RDW 14.1 %      RDW-SD 51.8 fl      MPV 10.2 fL      Platelets 253 10*3/mm3      Neutrophil % 67.6 %      Lymphocyte % 20.5 %      Monocyte % 9.5 %      Eosinophil % 1.7 %      Basophil % 0.4 %      Immature Grans % 0.3 %      Neutrophils, Absolute 8.66 10*3/mm3      Lymphocytes, Absolute 2.62 10*3/mm3      Monocytes, Absolute 1.21 10*3/mm3      Eosinophils, Absolute 0.22 10*3/mm3      Basophils, Absolute 0.05 10*3/mm3      Immature Grans, Absolute 0.04 10*3/mm3      nRBC 0.0 /100 WBC     Troponin [[id number]]  (Normal) Collected:  09/27/19 1001    Specimen:  Blood Updated:  09/27/19 1040     Troponin T 0.019 ng/mL     POC Glucose Once [[id number]]  (Abnormal) Collected:  09/27/19 0941    Specimen:  Blood Updated:  09/27/19 0959     Glucose 134 mg/dL      Comment: : [id number] Diaz (Johnson) Bethanie Goyal ID: [id number]       Comprehensive Metabolic Panel [[id number]]  (Abnormal) Collected:  09/27/19 0456    Specimen:  Blood Updated:  09/27/19 0540     Glucose 108 mg/dL      BUN 19 mg/dL      Creatinine 0.79 mg/dL      Sodium 144 mmol/L      Potassium 3.2 mmol/L      Chloride 102 mmol/L      CO2 32.0 mmol/L      Calcium 9.0 mg/dL      Total Protein 6.8 g/dL      Albumin 3.80 g/dL      ALT (SGPT) 7 U/L      AST (SGOT) 20 U/L      Alkaline Phosphatase 102 U/L      Total Bilirubin 0.2 mg/dL      eGFR Non African Amer 70 mL/min/1.73      Globulin 3.0 gm/dL      A/G Ratio 1.3 g/dL      BUN/Creatinine Ratio 24.1     Anion Gap 10.0 mmol/L     Troponin [[id number]]  (Normal) Collected:  09/27/19 0456    Specimen:  Blood Updated:  09/27/19 0540     Troponin T 0.022 ng/mL     TSH [[id number]]  (Normal) Collected:  09/27/19 0456    Specimen:  Blood Updated:  09/27/19 0540     TSH 3.210 uIU/mL     Lipid Panel [[id number]]  (Abnormal) Collected:  09/27/19 0456    Specimen:  Blood Updated:  09/27/19 0540     Total Cholesterol 237 mg/dL      Triglycerides 278 mg/dL       HDL Cholesterol 59 mg/dL      LDL Cholesterol  122 mg/dL      VLDL Cholesterol 55.6 mg/dL      LDL/HDL Ratio 2.07    Hemoglobin A1c [[id number]]  (Normal) Collected:  09/27/19 0456    Specimen:  Blood Updated:  09/27/19 0526     Hemoglobin A1C 5.10 %     CBC Auto Differential [[id number]]  (Abnormal) Collected:  09/27/19 0456    Specimen:  Blood Updated:  09/27/19 0512     WBC 8.84 10*3/mm3      RBC 4.46 10*6/mm3      Hemoglobin 14.9 g/dL      Hematocrit 44.2 %      MCV 99.1 fL      MCH 33.4 pg      MCHC 33.7 g/dL      RDW 13.7 %      RDW-SD 50.4 fl      MPV 9.7 fL      Platelets 264 10*3/mm3      Neutrophil % 55.2 %      Lymphocyte % 33.4 %      Monocyte % 8.6 %      Eosinophil % 1.9 %      Basophil % 0.6 %      Immature Grans % 0.3 %      Neutrophils, Absolute 4.88 10*3/mm3      Lymphocytes, Absolute 2.95 10*3/mm3      Monocytes, Absolute 0.76 10*3/mm3      Eosinophils, Absolute 0.17 10*3/mm3      Basophils, Absolute 0.05 10*3/mm3      Immature Grans, Absolute 0.03 10*3/mm3      nRBC 0.0 /100 WBC     Troponin [[id number]]  (Normal) Collected:  09/26/19 2206    Specimen:  Blood Updated:  09/26/19 2231     Troponin T 0.016 ng/mL     Troponin [[id number]]  (Normal) Collected:  09/26/19 1904    Specimen:  Blood Updated:  09/26/19 1941     Troponin T 0.010 ng/mL     Comprehensive Metabolic Panel [[id number]]  (Abnormal) Collected:  09/26/19 1904    Specimen:  Blood Updated:  09/26/19 1931     Glucose 180 mg/dL      BUN 21 mg/dL      Creatinine 0.66 mg/dL      Sodium 140 mmol/L      Potassium 3.7 mmol/L      Chloride 100 mmol/L      CO2 29.0 mmol/L      Calcium 9.0 mg/dL      Total Protein 7.0 g/dL      Albumin 3.80 g/dL      ALT (SGPT) 7 U/L      AST (SGOT) 17 U/L      Alkaline Phosphatase 93 U/L      Total Bilirubin 0.2 mg/dL      eGFR Non African Amer 86 mL/min/1.73      Globulin 3.2 gm/dL      A/G Ratio 1.2 g/dL      BUN/Creatinine Ratio 31.8     Anion Gap 11.0 mmol/L     proBNP [[id number]]  (Abnormal) Collected:   09/26/19 1904    Specimen:  Blood Updated:  09/26/19 1931     proBNP 9,804.0 pg/mL     CBC Auto Differential [[id number]]  (Abnormal) Collected:  09/26/19 1904    Specimen:  Blood Updated:  09/26/19 1913     WBC 8.62 10*3/mm3      RBC 4.64 10*6/mm3      Hemoglobin 15.6 g/dL      Hematocrit 45.5 %      MCV 98.1 fL      MCH 33.6 pg      MCHC 34.3 g/dL      RDW 13.9 %      RDW-SD 50.3 fl      MPV 9.5 fL      Platelets 300 10*3/mm3      Neutrophil % 73.3 %      Lymphocyte % 19.1 %      Monocyte % 6.3 %      Eosinophil % 0.7 %      Basophil % 0.3 %      Immature Grans % 0.3 %      Neutrophils, Absolute 6.31 10*3/mm3      Lymphocytes, Absolute 1.65 10*3/mm3      Monocytes, Absolute 0.54 10*3/mm3      Eosinophils, Absolute 0.06 10*3/mm3      Basophils, Absolute 0.03 10*3/mm3      Immature Grans, Absolute 0.03 10*3/mm3      nRBC 0.0 /100 WBC     Red Top [[id number]] Collected:  09/26/19 1904    Specimen:  Blood Updated:  09/26/19 1910        Imaging Results (all)     Procedure Component Value Units Date/Time    XR Chest PA & Lateral [[id number]] Collected:  09/29/19 1054     Updated:  09/29/19 1059    Narrative:       Exam:   XR CHEST PA AND LATERAL-       Date:  09/29/2019      History:  Female, age  84 years;temp, elevated wbc, cough; R07.9-Chest  pain, unspecified; I50.9-Heart failure, unspecified; M48.56XA-Collapsed  vertebra, not elsewhere classified, lumbar region, initial encounter for  fracture; M99.89-Other biomechanical lesions of abdomen and other  regions; M51.9-Unspecified thoracic, thoracolumbar and lumbosacral  intervertebral disc disorder; Z74.09-Other reduced mobility;  R68.89-Other general      COMPARISON:  Chest x-ray dated 09/26/2019     Findings :     The heart and mediastinum are normal in size. New left base opacity,  concerning for pneumonia in this clinical setting. No pleural effusion.  No measurable pneumothorax.  The bones show no acute pathology.         Impression:       Impression:     Left base  opacity, new concerning for pneumonia.     This report was finalized on 09/29/2019 10:56 by Dr. Allie Juarez MD.    CT Lumbar Spine Without Contrast [[id number]] Collected:  09/26/19 2225     Updated:  09/26/19 2240    Narrative:       CT LUMBAR SPINE WO CONTRAST- 9/26/2019 9:05 PM CDT     History: T/L-spine trauma, minor-mod, low back pain     Comparison: None      DLP: 559 mGy cm. Automated exposure control was utilized to diminish  patient radiation dose.     Technique: Serial helical tomographic images of the lumbar spine were  obtained without the use of intravenous contrast. Additionally, sagittal  and coronal reformatted images were provided for review.      Findings:   Alignment: There is normal lumbar lordosis. There is mild  anterolisthesis of L4 on L5 and L5 on S1 felt to represent subluxation  at the level of the facets.     Bones: There is a mild compression fracture at the T12 level unchanged  from a previous CT dated 10/23/2017. Slight retropulsion of the  posterior superior aspect of the vertebral body is stable from the  previous exam. There is mild associated ventral effacement of the thecal  sac. There is a new compression deformity from the previous CT involving  the superior endplate of L1 with slight retropulsion of the posterior  superior aspect of this vertebral body. This fracture is more difficult  to accurately age. It is a new finding from the previous CT of  10/23/2017 but appears to have some osteophyte formation along the  anterior aspect of the vertebral body suggesting this may be of some  chronicity. This could be followed up with MRI to more accurately age  the fracture. There is no involvement of the posterior elements. There  is minimal ventral effacement of the thecal sac related to the posterior  superior aspect of this vertebral body. Vertebral body heights are  otherwise maintained.     Disc spaces: Maintained. Vacuum disc phenomena are noted at T12-L1 and  L5-S1.     Canal  and neuroforamina:      T12-L1: There is slight retropulsion of the posterior superior aspect of  the L1 vertebral body with minimal effacement of the thecal sac without  evidence of associated central or foraminal stenosis.     L1-L2: There is no bulging of the disc. There is no central or foraminal  stenosis.     L2-L3: There is no bulging of the disc. There is no central or foraminal  stenosis.     L3-L4: There is bulging of the disc. There is a left foraminal and  lateral protrusion results in left moderate foraminal encroachment.  There is mild right foraminal stenosis.     L4-L5: There is broad-based bulging of the disc eccentric to the left.  Is moderate facet and ligamentous hypertrophy with grade 1  anterolisthesis of L4 and L5. There is moderate central spinal stenosis.  Mild left-sided foraminal narrowing is present.     L5-S1: There is moderate facet and ligamentous hypertrophy. There is  broad-based bulging of the disc. Mild anterolisthesis of L5 on S1 is  secondary to subluxation level the facets. There is no central stenosis.  No neural foraminal narrowing is present.     Soft tissues: Multiple cysts are noted of the kidneys. No evidence of  retroperitoneal lymphadenopathy in those portions of the retroperitoneum  which are visualized.       Impression:       Impression:   1. Chronic compression deformity at T12. There is also mild compression  deformity involving the superior endplate of L1 which is more difficult  to accurately age. It was not present on a previous CT of 10/23/2017 but  does demonstrate some osteophyte formation along its anterior aspect  which would favor some chronicity. Follow-up with outpatient MRI could  be obtained if so desired by the referring physician to more accurately  age this fracture. No additional fractures are present.  2. Bulging of the disc as well as facet and ligamentous hypertrophy  contributes to central and foraminal stenosis at several levels as  described  above. Mild anterolisthesis of L4 on L5 and L5 on S1 is  secondary to subluxation at the level of the facets.        This report was finalized on 09/26/2019 22:37 by Dr. Jeffy Felix MD.    CT Facial Bones Without Contrast [[id number]] Collected:  09/26/19 2220     Updated:  09/26/19 2228    Narrative:       EXAMINATION: CT facial bones without contrast 09/26/2019     DOSE 368.4 mGycm. Automated exposure control was utilized to diminish  patient radiation dose..     HISTORY: Facial trauma. Fracture suspected     FINDINGS: Multiple contiguous axial and obtained of the facial bones at  1.5 mm intervals with reformatted images obtained in the sagittal and  coronal projections from the original data set.     No acute facial fractures are demonstrated. Bilateral TMJ disease is  present with some narrowing of the joint space. 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 There is an air-fluid level within the  left frontal sinus with mucoperiosteal thickening. There is also  mucoperiosteal thickening of the left ethmoid air cells and left  maxillary sinus. Brenda bullosa are noted of the middle turbinates  bilaterally. Bilateral mandibular and maxillary implants are present.       Impression:       1.. No evidence of acute facial fracture.  2. Chronic sinus disease in the left maxillary sinus, left ethmoid air  cells and left frontal sinus. 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  3. Brenda bullosa the middle turbinates. There is stenosis of the  ostiomeatal complex on the left.  This report was finalized on 09/26/2019 22:25 by Dr. Jeffy Felix MD.    XR Chest 1 View [[id number]] Collected:  09/26/19 2030     Updated:  09/26/19 2034    Narrative:       EXAMINATION: Chest 1 view 09/26/2019     HISTORY: Chest pain     FINDINGS: Today's exam is compared to previous study of 04/05/2019.  There is moderate cardiomegaly. There is an ectatic thoracic aorta.  There is a granuloma within the left  lung base. No evidence of lobar  pneumonia or effusion.       Impression:       . No acute disease.  This report was finalized on 09/26/2019 20:31 by Dr. Jeffy Felix MD.        Results for orders placed during the hospital encounter of 09/26/19   Adult Transthoracic Echo Complete W/ Cont if Necessary Per Protocol    Narrative · Estimated EF = 65%.  · Left ventricular wall thickness is consistent with severe concentric   hypertrophy.  · LVOT gradient of 62 mm Hg  · No evidence of pulmonary hypertension is present.  · Left ventricular diastolic dysfunction.  · Left atrial cavity size is moderately dilated.  · Mild aortic valve stenosis is present.  · There is a trivial pericardial effusion. There is no evidence of cardiac   tamponade.          History of Present Illness on Day of Discharge: Patient is sitting up in the chair drinking a milkshake.  She reports feeling better overall today and is very eager for discharge.    Hospital Course:  Ms. Mccall is an 84-year-old  female who follows Dr. Juancarlos Dotson for primary care.  She has a past medical history significant for chronic obstructive pulmonary disease, coronary artery disease, gastroesophageal reflux disease, hypertension, and tobacco abuse.  She presented to the [facility] emergency department on 9/26/2019 with complaints of shortness of breath and chest pain.  He had suffered a fall at home as well.  In the emergency department, the patient's BNP level was elevated at 9804.  Otherwise, laboratory work-up was essentially unrevealing.  Chest x-ray showed no acute disease.  EKG showed no acute ST-T wave changes.  The patient was admitted to the hospitalist service for further evaluation and management.    The patient's troponin was trended and was found to be negative x4 occurrences.  She was felt to have an acute exacerbation of diastolic heart failure.  She was given IV Lasix on admission.  A transthoracic echocardiogram was  performed, which demonstrated mild aortic valve stenosis, left ventricular diastolic dysfunction with preserved ejection fraction of 65%.  There was trivial pericardial effusion without evidence of tamponade.  The patient's antihypertensives were held due to hypotension.      On 9/29, the patient was noted to be more tachycardic, her white blood cell count had risen to 13,000 and she did have a low-grade temperature of 100.1 °F.  She was also more hypotensive with a blood pressure of 82/61.  She was given and IV fluids bolus and a short course of Iv fluids thereafter.  A PA lateral chest x-ray was checked which did show a left basilar opacity.  She was started on Rocephin and given a one-time dose of vancomycin.  Azithromycin was started shortly thereafter as well.  She was felt to possibly have a urinary tract infection as well with presence of leukocytes, 13-20 white blood cells, and 2+ bacteria on urinalysis.  Her urine culture showed mixed joan, likely a contaminant.  Her blood cultures have shown no growth thus far.  Urinary antigens for strep and Legionella have been negative.  She was unable to produce sputum for respiratory culture.  She has been given DuoNeb breathing treatments, Mucinex, and pulmonary toileting with incentive spirometry and flutter valve.  Her blood pressure is improved, and her Coreg will be restarted at time of discharge at a much lower dosage.  She should continue to hold her hydrochlorothiazide for now and this can be resumed on an outpatient basis if needed.    The patient was evaluated by physical and Occupational Therapy feel she should be discharged to a skilled nursing facility for ongoing rehabilitation.  She will discharge to [facility] today.  She should follow with the facility physician as soon as possible for posthospitalization assessment and with her primary care provider within 1 week of discharge from the facility.  Adult Protective Services will be  "following the patient as their has been concern for her safety regarding her living situation with her grandson.    Condition on Discharge: Stable    Physical Exam on Discharge:  /48 (BP Location: Right arm, Patient Position: Lying)   Pulse 59   Temp 98.5 °F (36.9 °C) (Oral)   Resp 18   Ht 152.4 cm (60\")   Wt 50.4 kg (111 lb 3.2 oz)   SpO2 92%   BMI 21.72 kg/m²   Physical Exam   Constitutional: She is oriented to person, place, and time. She appears well-developed and well-nourished. No distress.   Thin, frail, chronically ill appearing   HENT:   Head: Normocephalic and atraumatic.   Neck: Normal range of motion. Neck supple. No JVD present. No tracheal deviation present.   Cardiovascular: Normal rate, regular rhythm and intact distal pulses. Exam reveals no gallop and no friction rub.   Murmur heard.  Sinus bradycardia sinus 55-72 with bundle branch block   Pulmonary/Chest: Effort normal. She has no wheezes. She has no rales.   Coarse breath sounds, grossly diminished   Abdominal: Soft. Bowel sounds are normal. She exhibits no distension. There is no tenderness.   Musculoskeletal: She exhibits no edema or tenderness.   Neurological: She is alert and oriented to person, place, and time. No cranial nerve deficit.   Skin: Skin is warm and dry. No rash noted. No erythema.   Psychiatric: She has a normal mood and affect. Her behavior is normal. Judgment and thought content normal.   Less anxious today   Vitals reviewed.    Discharge Disposition:  Skilled Nursing Facility (DC - External)    Discharge Medications:     Discharge Medications      New Medications      Instructions Start Date   azithromycin 500 MG tablet  Commonly known as:  ZITHROMAX   500 mg, Oral, Daily      cefdinir 300 MG capsule  Commonly known as:  OMNICEF   300 mg, Oral, 2 Times Daily   Start Date:  10/2/2019     guaiFENesin 600 MG 12 hr tablet  Commonly known as:  MUCINEX   1,200 mg, Oral, Every 12 Hours Scheduled      hydrOXYzine 25 MG " tablet  Commonly known as:  ATARAX   25 mg, Oral, 3 Times Daily PRN      raNITIdine 150 MG tablet  Commonly known as:  ZANTAC   150 mg, Oral, Nightly      sennosides-docusate sodium 8.6-50 MG tablet  Commonly known as:  SENOKOT-S   2 tablets, Oral, Nightly         Changes to Medications      Instructions Start Date   carvedilol 3.125 MG tablet  Commonly known as:  COREG  What changed:    · medication strength  · how much to take  · additional instructions   3.125 mg, Oral, 2 Times Daily With Meals, Hold for SBP <100      oxyCODONE-acetaminophen  MG per tablet  Commonly known as:  PERCOCET  What changed:    · when to take this  · reasons to take this   1 tablet, Oral, 3 Times Daily PRN         Continue These Medications      Instructions Start Date   ARIPiprazole 2 MG tablet  Commonly known as:  ABILIFY   2 mg, Oral, Nightly      aspirin 81 MG chewable tablet   81 mg, Oral, Daily      FLUoxetine 20 MG capsule  Commonly known as:  PROzac   60 mg, Oral, Daily      gabapentin 300 MG capsule  Commonly known as:  NEURONTIN   300 mg, Oral, 3 Times Daily      ipratropium-albuterol  MCG/ACT inhaler  Commonly known as:  COMBIVENT RESPIMAT   1 puff, Inhalation, 4 Times Daily PRN      montelukast 10 MG tablet  Commonly known as:  SINGULAIR   10 mg, Oral, Nightly         Stop These Medications    hydroCHLOROthiazide 12.5 MG tablet  Commonly known as:  HYDRODIURIL          Discharge Diet:   Diet Instructions     Diet: Cardiac; Thin      Discharge Diet:  Cardiac    Fluid Consistency:  Thin        Activity at Discharge:   Activity Instructions     Activity as Tolerated          Discharge Care Plan/Instructions:   1.  Return for any acute or worsening symptoms  2.  Continue incentive spirometer and flutter valve use  3.  Tobacco cessation education  4.  Medication changes as above    Follow-up Appointments:   1.  Facility physician as soon as possible for posthospitalization assessment.  Primary care provider within 1  week of discharge from the facility.  Future Appointments   Date Time Provider Department Center   12/12/2019  2:45 PM Marry Rodriguez APRN [facility] None     Test Results Pending at Discharge: We will follow blood cultures to completion.  No growth to date.    BOZENA Vaughan  10/01/19  11:58 AM    Time: 40 minutes

## 2019-10-01 NOTE — PLAN OF CARE
Problem: Patient Care Overview  Goal: Plan of Care Review  Outcome: Ongoing (interventions implemented as appropriate)   10/01/19 1105   Coping/Psychosocial   Plan of Care Reviewed With patient   Plan of Care Review   Progress improving   OTHER   Outcome Summary Pt was cga supine to sit and cga to stand Pt amb 50' x 3 with rollator cga. Pt took 1 sitting rest and 1 standing rest. Recommend inpatient rehab.
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
## 2019-10-01 NOTE — THERAPY TREATMENT NOTE
Acute Care - Physical Therapy Treatment Note  [facility]     Patient Name: Isha Mccall  : 1935  MRN: [id number]  Today's Date: 10/1/2019  Onset of Illness/Injury or Date of Surgery: 19     Referring Physician: Dr. Wolfe    Admit Date: 2019    Visit Dx:    ICD-10-CM ICD-9-CM   1. Chest pain, unspecified type R07.9 786.50   2. Congestive heart failure, unspecified HF chronicity, unspecified heart failure type (CMS/[facility]) I50.9 428.0   3. Non-traumatic compression fracture of first lumbar vertebra, initial encounter (CMS/[facility]) M48.56XA 733.13   4. Foraminal stenosis due to intervertebral disc disease M99.89 724.00    M51.9 722.90   5. Impaired mobility Z74.09 799.89   6. Decreased activities of daily living (ADL) R68.89 780.99     Patient Active Problem List   Diagnosis   • COPD (chronic obstructive pulmonary disease) (CMS/[facility])   • Hypertension   • Coronary artery disease   • Generalized pain   • Elevated troponin   • Shortness of breath   • Tobacco user   • Calcified granuloma of lung (CMS/[facility])   • Gastroesophageal reflux disease without esophagitis   • Nocturnal hypoxia   • Personal history of nicotine dependence   • Chest pain   • Acute on chronic diastolic congestive heart failure (CMS/[facility])   • Back pain   • Back pain   • Falling   • Vasovagal syncope   • Severe malnutrition (CMS/[facility])   • Sepsis, unspecified organism (CMS/[facility])       Therapy Treatment    Rehabilitation Treatment Summary     Row Name 10/01/19 1030             Treatment Time/Intention    Discipline  physical therapy assistant  -AE      Document Type  therapy note (daily note)  -AE      Subjective Information  complains of;weakness  -AE      Existing Precautions/Restrictions  fall  -AE      Recorded by [AE] Aditi Palomo PTA 10/01/19 1105      Row Name 10/01/19 1030             Bed Mobility Assessment/Treatment    Supine-Sit [place] (Bed Mobility)  supervision  -AE      Recorded by [AE] Aditi Palomo PTA 10/01/19 1109       Row Name 10/01/19 1030             Sit-Stand Transfer    Sit-Stand [place] (Transfers)  contact guard;verbal cues  -AE      Recorded by [AE] Aditi Palomo, PTA 10/01/19 1105      Row Name 10/01/19 1030             Stand-Sit Transfer    Stand-Sit [place] (Transfers)  stand by assist;verbal cues  -AE      Recorded by [AE] Aditi Palomo, PTA 10/01/19 1105      Row Name 10/01/19 1030             Gait/Stairs Assessment/Training    [place] Level (Gait)  contact guard  -AE      Assistive Device (Gait)  walker, front-wheeled  -AE      Distance in Feet (Gait)  50 x 3 reps x 1 standing and 1 sit rest  -AE      Recorded by [AE] Aditi Palomo, PTA 10/01/19 1105      Row Name 10/01/19 1030             Therapeutic Exercise    Lower Extremity (Therapeutic Exercise)  LAQ (long arc quad), bilateral  -AE      Lower Extremity Range of Motion (Therapeutic Exercise)  ankle dorsiflexion/plantar flexion, bilateral  -AE      Exercise Type (Therapeutic Exercise)  AROM (active range of motion)  -AE      Position (Therapeutic Exercise)  seated  -AE      Sets/Reps (Therapeutic Exercise)  20  -AE      Recorded by [AE] Aditi Palomo, PTA 10/01/19 1105      Row Name 10/01/19 1030             Positioning and Restraints    Pre-Treatment Position  in bed  -AE      Post Treatment Position  chair  -AE      In Chair  sitting;call light within reach  -AE      Recorded by [AE] Aditi Palomo, PTA 10/01/19 1105        User Key  (r) = Recorded By, (t) = Taken By, (c) = Cosigned By    Initials Name Effective Dates Discipline    AE Aditi Palomo, PTA 06/22/15 -  PT                   Physical Therapy Education     Title: PT OT SLP Therapies (In Progress)     Topic: Physical Therapy (In Progress)     Point: Mobility training (Done)     Learning Progress Summary           Patient Acceptance, LEO, VU by AE at 9/30/2019 11:34 AM    Comment:  POC and ex's    Acceptance, E, NR by AF at 9/27/2019  8:12 AM    Comment:  Educated on benefits  of activity and ongoing PT POC.                   Point: Home exercise program (Done)     Learning Progress Summary           Patient Acceptance, E, VU by AE at 9/30/2019 11:34 AM    Comment:  POC and ex's                               User Key     Initials Effective Dates Name Provider Type Discipline    AE 06/22/15 -  Aditi Palomo PTA Physical Therapy Assistant PT    AF 08/27/19 -  Cherie Lira, PT Student PT Student PT                PT Recommendation and Plan     Plan of Care Reviewed With: patient  Progress: improving  Outcome Summary: Pt was cga supine to sit and cga- Lobo to stand Pt amb 50' x 2 with rollator Lobo Pt took 1 sitting rest. Recommend inpatient rehab.     Time Calculation:   PT Charges     Row Name 10/01/19 1106             Time Calculation    Start Time  1030  -AE      Stop Time  1054  -AE      Time Calculation (min)  24 min  -AE      PT Received On  10/01/19  -AE      PT Goal Re-Cert Due Date  10/07/19  -AE         Time Calculation- PT    Total Timed Code Minutes- PT  24 minute(s)  -AE         Timed Charges    33798 - PT Therapeutic Exercise Minutes  9  -AE      42150 - Gait Training Minutes   15  -AE        User Key  (r) = Recorded By, (t) = Taken By, (c) = Cosigned By    Initials Name Provider Type    AE Aditi Palomo PTA Physical Therapy Assistant        Therapy Charges for Today     Code Description Service Date Service Provider Modifiers Qty    46102301066 HC GAIT TRAINING EA 15 MIN 9/30/2019 Aditi Palomo PTA GP 1    16765967521 HC PT THER PROC EA 15 MIN 9/30/2019 Aditi Palomo PTA GP 1    01611036866 HC GAIT TRAINING EA 15 MIN 10/1/2019 Aditi Palomo PTA GP 1    67585587749 HC PT THER PROC EA 15 MIN 10/1/2019 Aditi Palomo PTA GP 1          PT G-Codes  Outcome Measure Options: AM-PAC 6 Clicks Daily Activity (OT)  AM-PAC 6 Clicks Score (PT): 19  AM-PAC 6 Clicks Score (OT): 21    Aditi Palomo PTA  10/1/2019

## 2019-10-01 NOTE — PROGRESS NOTES
Continued Stay Note   [facility]     Patient Name: Isha Mccall  MRN: [id number]  Today's Date: 10/1/2019    Admit Date: 9/26/2019    Discharge Plan     Row Name 10/01/19 1251       Plan    Plan Comments  STEPHANI spoke with Dulce Maria from [facility] who states that pt is able to be discharged to facility today. Pt's discharge plan for when rehab is completed is to be discharged home with kati Lofton, until a place for pt can live is obtained. Kirsty is currently in the room speaking with ANDIE and is able to transport pt to facility when discharged. ANDIE Evans, is here at this time and has spoke to wes Lofton, and Dulce Maria from [facility]. ANDIE Evans, and Dulce Maria from [facility] has an agreement that Nelson Sanchez is not allowed in [facility] due to pt's safety. STEPHANI will follow and assist with any other discharge needs that may arise. Phone: [phone] Fax: [phone]    Final Discharge Disposition Code  03 - skilled nursing facility (SNF)        Discharge Codes    No documentation.       Expected Discharge Date and Time     Expected Discharge Date Expected Discharge Time    Oct 1, 2019             Alondra Altman
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
## 2019-10-02 NOTE — THERAPY DISCHARGE NOTE
Acute Care - Occupational Therapy Discharge Summary  [facility]     Patient Name: Isha Mccall  : 1935  MRN: [id number]    Today's Date: 10/2/2019  Onset of Illness/Injury or Date of Surgery: 19    Date of Referral to OT: 19  Referring Physician: Dr. Wolfe      Admit Date: 2019        OT Recommendation and Plan    Visit Dx:    ICD-10-CM ICD-9-CM   1. Chest pain, unspecified type R07.9 786.50   2. Congestive heart failure, unspecified HF chronicity, unspecified heart failure type (CMS/[facility]) I50.9 428.0   3. Non-traumatic compression fracture of first lumbar vertebra, initial encounter (CMS/[facility]) M48.56XA 733.13   4. Foraminal stenosis due to intervertebral disc disease M99.89 724.00    M51.9 722.90   5. Impaired mobility Z74.09 799.89   6. Decreased activities of daily living (ADL) R68.89 780.99   7. Essential hypertension I10 401.9               Rehab Goal Summary     Row Name 10/02/19 0700             Transfer Goal 1 (OT)    Activity/Assistive Device (Transfer Goal 1, OT)  sit-to-stand/stand-to-sit;bed-to-chair/chair-to-bed;toilet;tub  -TS      [place] Level/Cues Needed (Transfer Goal 1, OT)  standby assist  -TS      Time Frame (Transfer Goal 1, OT)  long term goal (LTG);by discharge  -TS      Progress/Outcome (Transfer Goal 1, OT)  goal not met  -TS         Toileting Goal 1 (OT)    Activity/Device (Toileting Goal 1, OT)  toileting skills, all;commode  -TS      [place] Level/Cues Needed (Toileting Goal 1, OT)  supervision required  -TS      Time Frame (Toileting Goal 1, OT)  long term goal (LTG);by discharge  -TS      Progress/Outcome (Toileting Goal 1, OT)  goal met  -TS         Balance Goal 1 (OT)    Activity/Assistive Device (Balance Goal 1, OT)  sitting, dynamic;standing, dynamic  -TS      [place] Level/Cues Needed (Balance Goal 1, OT)  standby assist  -TS      Time Frame (Balance Goal 1, OT)  long term goal (LTG);by discharge  -TS      Progress/Outcomes (Balance Goal  1, OT)  goal not met  -        User Key  (r) = Recorded By, (t) = Taken By, (c) = Cosigned By    Initials Name Provider Type Discipline    TS Adriana Farias COTA/L Occupational Therapy Assistant OT              Therapy Suggested Charges     Code   Minutes Charges    48334 (CPT®) Hc Ot Neuromusc Re Education Ea 15 Min      24796 (CPT®) Hc Ot Ther Proc Ea 15 Min      43758 (CPT®) Hc Ot Therapeutic Act Ea 15 Min      18890 (CPT®) Hc Ot Manual Therapy Ea 15 Min      53828 (CPT®) Hc Ot Iontophoresis Ea 15 Min      40905 (CPT®) Hc Ot Elec Stim Ea-Per 15 Min      48770 (CPT®) Hc Ot Ultrasound Ea 15 Min      53855 (CPT®) Hc Ot Self Care/Mgmt/Train Ea 15 Min 29 2    Total  29 2              OT Discharge Summary  Reason for Discharge: Discharge from facility  Outcomes Achieved: Refer to plan of care for updates on goals achieved  Discharge Destination: [facility]      LISA Perez  10/2/2019

## 2019-10-04 LAB
BACTERIA SPEC AEROBE CULT: NORMAL
BACTERIA SPEC AEROBE CULT: NORMAL

## 2019-12-03 PROBLEM — I21.4 NSTEMI (NON-ST ELEVATED MYOCARDIAL INFARCTION) (HCC): Status: ACTIVE | Noted: 2019-01-01

## 2019-12-09 PROBLEM — J44.9 COPD (CHRONIC OBSTRUCTIVE PULMONARY DISEASE) (HCC): Status: RESOLVED | Noted: 2019-04-05 | Resolved: 2019-12-09

## 2019-12-09 PROBLEM — J44.9 COPD, GROUP C, BY GOLD 2017 CLASSIFICATION (HCC): Status: ACTIVE | Noted: 2019-12-09

## 2019-12-09 PROBLEM — G61.0 GUILLAIN BARRÉ SYNDROME (HCC): Status: ACTIVE | Noted: 2019-12-09

## 2019-12-20 ENCOUNTER — OUTSIDE FACILITY SERVICE (OUTPATIENT)
Dept: PULMONOLOGY | Facility: CLINIC | Age: 84
End: 2019-12-20

## 2019-12-20 PROBLEM — J98.09 BRONCHIAL OBSTRUCTION: Status: ACTIVE | Noted: 2019-01-01

## 2019-12-20 PROBLEM — J18.9 PNEUMONIA: Status: ACTIVE | Noted: 2019-01-01

## 2019-12-20 PROCEDURE — 99223 1ST HOSP IP/OBS HIGH 75: CPT | Performed by: INTERNAL MEDICINE

## 2019-12-21 ENCOUNTER — OUTSIDE FACILITY SERVICE (OUTPATIENT)
Dept: PULMONOLOGY | Facility: CLINIC | Age: 84
End: 2019-12-21

## 2019-12-21 PROCEDURE — 99232 SBSQ HOSP IP/OBS MODERATE 35: CPT | Performed by: INTERNAL MEDICINE

## 2019-12-22 ENCOUNTER — OUTSIDE FACILITY SERVICE (OUTPATIENT)
Dept: PULMONOLOGY | Facility: CLINIC | Age: 84
End: 2019-12-22

## 2019-12-22 PROCEDURE — 99232 SBSQ HOSP IP/OBS MODERATE 35: CPT | Performed by: INTERNAL MEDICINE

## 2019-12-23 ENCOUNTER — OUTSIDE FACILITY SERVICE (OUTPATIENT)
Dept: PULMONOLOGY | Facility: CLINIC | Age: 84
End: 2019-12-23

## 2019-12-23 PROCEDURE — 99232 SBSQ HOSP IP/OBS MODERATE 35: CPT | Performed by: INTERNAL MEDICINE

## 2020-01-01 ENCOUNTER — TELEPHONE (OUTPATIENT)
Dept: CARDIOLOGY | Age: 85
End: 2020-01-01

## 2020-01-01 ENCOUNTER — HOSPITAL ENCOUNTER (INPATIENT)
Age: 85
LOS: 5 days | Discharge: HOME HEALTH | DRG: 193 | End: 2020-01-25
Attending: EMERGENCY MEDICINE | Admitting: HOSPITALIST
Payer: MEDICARE

## 2020-01-01 ENCOUNTER — APPOINTMENT (OUTPATIENT)
Dept: GENERAL RADIOLOGY | Age: 85
End: 2020-01-01
Payer: MEDICARE

## 2020-01-01 ENCOUNTER — CARE COORDINATION (OUTPATIENT)
Dept: CASE MANAGEMENT | Age: 85
End: 2020-01-01

## 2020-01-01 ENCOUNTER — APPOINTMENT (OUTPATIENT)
Dept: GENERAL RADIOLOGY | Age: 85
DRG: 193 | End: 2020-01-01
Payer: MEDICARE

## 2020-01-01 ENCOUNTER — HOSPITAL ENCOUNTER (EMERGENCY)
Age: 85
Discharge: HOME | End: 2020-02-20
Attending: EMERGENCY MEDICINE
Payer: MEDICARE

## 2020-01-01 ENCOUNTER — APPOINTMENT (OUTPATIENT)
Dept: CT IMAGING | Age: 85
DRG: 193 | End: 2020-01-01
Payer: MEDICARE

## 2020-01-01 ENCOUNTER — HOSPITAL ENCOUNTER (EMERGENCY)
Age: 85
Discharge: HOME | End: 2020-02-04
Attending: EMERGENCY MEDICINE
Payer: MEDICARE

## 2020-01-01 ENCOUNTER — APPOINTMENT (OUTPATIENT)
Dept: CT IMAGING | Age: 85
End: 2020-01-01
Payer: MEDICARE

## 2020-01-01 ENCOUNTER — HOSPITAL ENCOUNTER (OUTPATIENT)
Dept: INFUSION THERAPY | Age: 85
Setting detail: INFUSION SERIES
Discharge: HOME | End: 2020-03-09
Payer: MEDICARE

## 2020-01-01 ENCOUNTER — OFFICE VISIT (OUTPATIENT)
Dept: CARDIOLOGY | Age: 85
End: 2020-01-01
Payer: MEDICARE

## 2020-01-01 ENCOUNTER — HOSPITAL ENCOUNTER (OUTPATIENT)
Age: 85
Setting detail: OBSERVATION
LOS: 1 days | Discharge: HOME | End: 2020-02-15
Attending: EMERGENCY MEDICINE | Admitting: HOSPITALIST
Payer: MEDICARE

## 2020-01-01 ENCOUNTER — HOSPITAL ENCOUNTER (EMERGENCY)
Age: 85
End: 2020-09-21
Attending: EMERGENCY MEDICINE
Payer: MEDICARE

## 2020-01-01 VITALS
TEMPERATURE: 97.5 F | SYSTOLIC BLOOD PRESSURE: 139 MMHG | RESPIRATION RATE: 18 BRPM | OXYGEN SATURATION: 97 % | WEIGHT: 109 LBS | HEIGHT: 60 IN | HEART RATE: 78 BPM | DIASTOLIC BLOOD PRESSURE: 72 MMHG | BODY MASS INDEX: 21.4 KG/M2

## 2020-01-01 VITALS
BODY MASS INDEX: 26.11 KG/M2 | HEIGHT: 60 IN | SYSTOLIC BLOOD PRESSURE: 104 MMHG | RESPIRATION RATE: 18 BRPM | DIASTOLIC BLOOD PRESSURE: 60 MMHG | HEART RATE: 83 BPM | OXYGEN SATURATION: 91 % | TEMPERATURE: 97.7 F | WEIGHT: 133 LBS

## 2020-01-01 VITALS
TEMPERATURE: 99.7 F | WEIGHT: 133 LBS | BODY MASS INDEX: 25.97 KG/M2 | RESPIRATION RATE: 18 BRPM | HEART RATE: 77 BPM | OXYGEN SATURATION: 91 % | DIASTOLIC BLOOD PRESSURE: 47 MMHG | SYSTOLIC BLOOD PRESSURE: 98 MMHG

## 2020-01-01 VITALS
HEART RATE: 84 BPM | DIASTOLIC BLOOD PRESSURE: 79 MMHG | OXYGEN SATURATION: 95 % | RESPIRATION RATE: 16 BRPM | WEIGHT: 125.13 LBS | SYSTOLIC BLOOD PRESSURE: 172 MMHG | TEMPERATURE: 98.6 F | BODY MASS INDEX: 23.63 KG/M2 | HEIGHT: 61 IN

## 2020-01-01 VITALS
DIASTOLIC BLOOD PRESSURE: 84 MMHG | HEIGHT: 60 IN | SYSTOLIC BLOOD PRESSURE: 150 MMHG | WEIGHT: 133 LBS | BODY MASS INDEX: 26.11 KG/M2 | HEART RATE: 80 BPM

## 2020-01-01 VITALS
DIASTOLIC BLOOD PRESSURE: 66 MMHG | SYSTOLIC BLOOD PRESSURE: 128 MMHG | HEIGHT: 60 IN | BODY MASS INDEX: 27.29 KG/M2 | HEART RATE: 72 BPM | OXYGEN SATURATION: 96 % | WEIGHT: 139 LBS

## 2020-01-01 DIAGNOSIS — I50.33 ACUTE ON CHRONIC DIASTOLIC (CONGESTIVE) HEART FAILURE (HCC): ICD-10-CM

## 2020-01-01 LAB
ALBUMIN SERPL-MCNC: 2.3 G/DL (ref 3.5–5.2)
ALBUMIN SERPL-MCNC: 2.4 G/DL (ref 3.5–5.2)
ALBUMIN SERPL-MCNC: 3 G/DL (ref 3.5–5.2)
ALBUMIN SERPL-MCNC: 3.4 G/DL (ref 3.5–5.2)
ALP BLD-CCNC: 103 U/L (ref 35–104)
ALP BLD-CCNC: 103 U/L (ref 35–104)
ALP BLD-CCNC: 108 U/L (ref 35–104)
ALP BLD-CCNC: 160 U/L (ref 35–104)
ALT SERPL-CCNC: 12 U/L (ref 5–33)
ALT SERPL-CCNC: 15 U/L (ref 5–33)
ALT SERPL-CCNC: 17 U/L (ref 5–33)
ALT SERPL-CCNC: <5 U/L (ref 5–33)
AMMONIA: 24 UMOL/L (ref 11–51)
ANION GAP SERPL CALCULATED.3IONS-SCNC: 10 MMOL/L (ref 7–19)
ANION GAP SERPL CALCULATED.3IONS-SCNC: 11 MMOL/L (ref 7–19)
ANION GAP SERPL CALCULATED.3IONS-SCNC: 12 MMOL/L (ref 7–19)
ANION GAP SERPL CALCULATED.3IONS-SCNC: 12 MMOL/L (ref 7–19)
ANION GAP SERPL CALCULATED.3IONS-SCNC: 15 MMOL/L (ref 7–19)
ANION GAP SERPL CALCULATED.3IONS-SCNC: 9 MMOL/L (ref 7–19)
APTT: 41.9 SEC (ref 26–36.2)
AST SERPL-CCNC: 10 U/L (ref 5–32)
AST SERPL-CCNC: 25 U/L (ref 5–32)
AST SERPL-CCNC: 26 U/L (ref 5–32)
AST SERPL-CCNC: 29 U/L (ref 5–32)
BACTERIA: NEGATIVE /HPF
BASE EXCESS ARTERIAL: 0.7 MMOL/L (ref -2–2)
BASE EXCESS ARTERIAL: 3.5 MMOL/L (ref -2–2)
BASOPHILS ABSOLUTE: 0 K/UL (ref 0–0.2)
BASOPHILS ABSOLUTE: 0.1 K/UL (ref 0–0.2)
BASOPHILS RELATIVE PERCENT: 0.2 % (ref 0–1)
BASOPHILS RELATIVE PERCENT: 0.3 % (ref 0–1)
BASOPHILS RELATIVE PERCENT: 0.4 % (ref 0–1)
BASOPHILS RELATIVE PERCENT: 0.4 % (ref 0–1)
BASOPHILS RELATIVE PERCENT: 0.5 % (ref 0–1)
BASOPHILS RELATIVE PERCENT: 1.1 % (ref 0–1)
BILIRUB SERPL-MCNC: 0.3 MG/DL (ref 0.2–1.2)
BILIRUB SERPL-MCNC: 0.5 MG/DL (ref 0.2–1.2)
BILIRUB SERPL-MCNC: <0.2 MG/DL (ref 0.2–1.2)
BILIRUB SERPL-MCNC: <0.2 MG/DL (ref 0.2–1.2)
BILIRUBIN URINE: NEGATIVE
BLOOD CULTURE, ROUTINE: NORMAL
BLOOD, URINE: ABNORMAL
BLOOD, URINE: NEGATIVE
BLOOD, URINE: NEGATIVE
BUN BLDV-MCNC: 15 MG/DL (ref 8–23)
BUN BLDV-MCNC: 16 MG/DL (ref 8–23)
BUN BLDV-MCNC: 17 MG/DL (ref 8–23)
BUN BLDV-MCNC: 19 MG/DL (ref 8–23)
BUN BLDV-MCNC: 19 MG/DL (ref 8–23)
BUN BLDV-MCNC: 24 MG/DL (ref 8–23)
BUN BLDV-MCNC: 29 MG/DL (ref 8–23)
BUN BLDV-MCNC: 31 MG/DL (ref 8–23)
BUN BLDV-MCNC: 31 MG/DL (ref 8–23)
CALCIUM SERPL-MCNC: 8.4 MG/DL (ref 8.8–10.2)
CALCIUM SERPL-MCNC: 8.5 MG/DL (ref 8.8–10.2)
CALCIUM SERPL-MCNC: 8.5 MG/DL (ref 8.8–10.2)
CALCIUM SERPL-MCNC: 8.6 MG/DL (ref 8.8–10.2)
CALCIUM SERPL-MCNC: 8.8 MG/DL (ref 8.8–10.2)
CALCIUM SERPL-MCNC: 8.9 MG/DL (ref 8.8–10.2)
CALCIUM SERPL-MCNC: 9.1 MG/DL (ref 8.8–10.2)
CALCIUM SERPL-MCNC: 9.3 MG/DL (ref 8.8–10.2)
CALCIUM SERPL-MCNC: 9.5 MG/DL (ref 8.8–10.2)
CARBOXYHEMOGLOBIN ARTERIAL: 1.5 % (ref 0–5)
CARBOXYHEMOGLOBIN ARTERIAL: 2.1 % (ref 0–5)
CHLORIDE BLD-SCNC: 101 MMOL/L (ref 98–111)
CHLORIDE BLD-SCNC: 101 MMOL/L (ref 98–111)
CHLORIDE BLD-SCNC: 102 MMOL/L (ref 98–111)
CHLORIDE BLD-SCNC: 97 MMOL/L (ref 98–111)
CHLORIDE BLD-SCNC: 98 MMOL/L (ref 98–111)
CHLORIDE BLD-SCNC: 99 MMOL/L (ref 98–111)
CHLORIDE BLD-SCNC: 99 MMOL/L (ref 98–111)
CHOLESTEROL, TOTAL: 161 MG/DL (ref 160–199)
CLARITY: CLEAR
CO2: 22 MMOL/L (ref 22–29)
CO2: 26 MMOL/L (ref 22–29)
CO2: 29 MMOL/L (ref 22–29)
CO2: 29 MMOL/L (ref 22–29)
CO2: 30 MMOL/L (ref 22–29)
CO2: 31 MMOL/L (ref 22–29)
CO2: 31 MMOL/L (ref 22–29)
CO2: 32 MMOL/L (ref 22–29)
CO2: 35 MMOL/L (ref 22–29)
COLOR: YELLOW
CREAT SERPL-MCNC: 0.9 MG/DL (ref 0.5–0.9)
CREAT SERPL-MCNC: 0.9 MG/DL (ref 0.5–0.9)
CREAT SERPL-MCNC: 1 MG/DL (ref 0.5–0.9)
CREAT SERPL-MCNC: 1.1 MG/DL (ref 0.5–0.9)
CREAT SERPL-MCNC: 1.1 MG/DL (ref 0.5–0.9)
CREAT SERPL-MCNC: 1.2 MG/DL (ref 0.5–0.9)
CREAT SERPL-MCNC: 1.2 MG/DL (ref 0.5–0.9)
CREAT SERPL-MCNC: 1.3 MG/DL (ref 0.5–0.9)
CREAT SERPL-MCNC: 1.4 MG/DL (ref 0.5–0.9)
CULTURE, BLOOD 2: NORMAL
EKG P AXIS: 40 DEGREES
EKG P AXIS: 46 DEGREES
EKG P AXIS: 65 DEGREES
EKG P AXIS: 87 DEGREES
EKG P-R INTERVAL: 150 MS
EKG P-R INTERVAL: 170 MS
EKG P-R INTERVAL: 172 MS
EKG P-R INTERVAL: 186 MS
EKG Q-T INTERVAL: 398 MS
EKG Q-T INTERVAL: 426 MS
EKG Q-T INTERVAL: 436 MS
EKG Q-T INTERVAL: 436 MS
EKG QRS DURATION: 150 MS
EKG QRS DURATION: 154 MS
EKG QRS DURATION: 158 MS
EKG QRS DURATION: 168 MS
EKG QTC CALCULATION (BAZETT): 450 MS
EKG QTC CALCULATION (BAZETT): 466 MS
EKG QTC CALCULATION (BAZETT): 469 MS
EKG QTC CALCULATION (BAZETT): 469 MS
EKG T AXIS: 105 DEGREES
EKG T AXIS: 113 DEGREES
EKG T AXIS: 126 DEGREES
EKG T AXIS: 68 DEGREES
EOSINOPHILS ABSOLUTE: 0 K/UL (ref 0–0.6)
EOSINOPHILS ABSOLUTE: 0.1 K/UL (ref 0–0.6)
EOSINOPHILS ABSOLUTE: 0.3 K/UL (ref 0–0.6)
EOSINOPHILS ABSOLUTE: 0.4 K/UL (ref 0–0.6)
EOSINOPHILS ABSOLUTE: 0.4 K/UL (ref 0–0.6)
EOSINOPHILS ABSOLUTE: 0.5 K/UL (ref 0–0.6)
EOSINOPHILS RELATIVE PERCENT: 0 % (ref 0–5)
EOSINOPHILS RELATIVE PERCENT: 0.1 % (ref 0–5)
EOSINOPHILS RELATIVE PERCENT: 0.1 % (ref 0–5)
EOSINOPHILS RELATIVE PERCENT: 0.8 % (ref 0–5)
EOSINOPHILS RELATIVE PERCENT: 3.8 % (ref 0–5)
EOSINOPHILS RELATIVE PERCENT: 3.8 % (ref 0–5)
EOSINOPHILS RELATIVE PERCENT: 4.5 % (ref 0–5)
EOSINOPHILS RELATIVE PERCENT: 5.7 % (ref 0–5)
EPITHELIAL CELLS, UA: 1 /HPF (ref 0–5)
GFR NON-AFRICAN AMERICAN: 36
GFR NON-AFRICAN AMERICAN: 39
GFR NON-AFRICAN AMERICAN: 43
GFR NON-AFRICAN AMERICAN: 43
GFR NON-AFRICAN AMERICAN: 47
GFR NON-AFRICAN AMERICAN: 47
GFR NON-AFRICAN AMERICAN: 53
GFR NON-AFRICAN AMERICAN: 60
GFR NON-AFRICAN AMERICAN: 60
GLUCOSE BLD-MCNC: 101 MG/DL (ref 74–109)
GLUCOSE BLD-MCNC: 121 MG/DL (ref 74–109)
GLUCOSE BLD-MCNC: 123 MG/DL (ref 74–109)
GLUCOSE BLD-MCNC: 131 MG/DL (ref 74–109)
GLUCOSE BLD-MCNC: 176 MG/DL (ref 74–109)
GLUCOSE BLD-MCNC: 86 MG/DL (ref 74–109)
GLUCOSE BLD-MCNC: 92 MG/DL (ref 74–109)
GLUCOSE BLD-MCNC: 95 MG/DL (ref 74–109)
GLUCOSE BLD-MCNC: 96 MG/DL (ref 74–109)
GLUCOSE URINE: NEGATIVE MG/DL
HCO3 ARTERIAL: 27 MMOL/L (ref 22–26)
HCO3 ARTERIAL: 27.8 MMOL/L (ref 22–26)
HCT VFR BLD CALC: 28.7 % (ref 37–47)
HCT VFR BLD CALC: 30.8 % (ref 37–47)
HCT VFR BLD CALC: 32.4 % (ref 37–47)
HCT VFR BLD CALC: 34.3 % (ref 37–47)
HCT VFR BLD CALC: 34.6 % (ref 37–47)
HCT VFR BLD CALC: 35 % (ref 37–47)
HCT VFR BLD CALC: 36.3 % (ref 37–47)
HCT VFR BLD CALC: 38.9 % (ref 37–47)
HDLC SERPL-MCNC: 84 MG/DL (ref 65–121)
HEMOGLOBIN, ART, EXTENDED: 10.4 G/DL (ref 12–16)
HEMOGLOBIN, ART, EXTENDED: 11.2 G/DL (ref 12–16)
HEMOGLOBIN: 10.1 G/DL (ref 12–16)
HEMOGLOBIN: 10.1 G/DL (ref 12–16)
HEMOGLOBIN: 10.5 G/DL (ref 12–16)
HEMOGLOBIN: 10.8 G/DL (ref 12–16)
HEMOGLOBIN: 11.5 G/DL (ref 12–16)
HEMOGLOBIN: 8.4 G/DL (ref 12–16)
HEMOGLOBIN: 9 G/DL (ref 12–16)
HEMOGLOBIN: 9.6 G/DL (ref 12–16)
HYALINE CASTS: 1 /HPF (ref 0–8)
IMMATURE GRANULOCYTES #: 0 K/UL
IMMATURE GRANULOCYTES #: 0.1 K/UL
IMMATURE GRANULOCYTES #: 0.2 K/UL
IMMATURE GRANULOCYTES #: 0.2 K/UL
IMMATURE GRANULOCYTES #: 0.3 K/UL
IMMATURE GRANULOCYTES #: 0.5 K/UL
INR BLD: 1.26 (ref 0.88–1.18)
IRON SATURATION: 10 % (ref 14–50)
IRON: 16 UG/DL (ref 37–145)
KETONES, URINE: NEGATIVE MG/DL
LACTIC ACID: 0.7 MMOL/L (ref 0.5–1.9)
LACTIC ACID: 1 MMOL/L (ref 0.5–1.9)
LACTIC ACID: 1.7 MMOL/L (ref 0.5–1.9)
LDL CHOLESTEROL CALCULATED: 52 MG/DL
LEUKOCYTE ESTERASE, URINE: ABNORMAL
LEUKOCYTE ESTERASE, URINE: NEGATIVE
LEUKOCYTE ESTERASE, URINE: NEGATIVE
LYMPHOCYTES ABSOLUTE: 1.7 K/UL (ref 1.1–4.5)
LYMPHOCYTES ABSOLUTE: 1.8 K/UL (ref 1.1–4.5)
LYMPHOCYTES ABSOLUTE: 1.9 K/UL (ref 1.1–4.5)
LYMPHOCYTES ABSOLUTE: 2.1 K/UL (ref 1.1–4.5)
LYMPHOCYTES ABSOLUTE: 2.2 K/UL (ref 1.1–4.5)
LYMPHOCYTES ABSOLUTE: 2.4 K/UL (ref 1.1–4.5)
LYMPHOCYTES ABSOLUTE: 2.7 K/UL (ref 1.1–4.5)
LYMPHOCYTES ABSOLUTE: 2.8 K/UL (ref 1.1–4.5)
LYMPHOCYTES RELATIVE PERCENT: 16.6 % (ref 20–40)
LYMPHOCYTES RELATIVE PERCENT: 17.3 % (ref 20–40)
LYMPHOCYTES RELATIVE PERCENT: 17.7 % (ref 20–40)
LYMPHOCYTES RELATIVE PERCENT: 20.2 % (ref 20–40)
LYMPHOCYTES RELATIVE PERCENT: 21.6 % (ref 20–40)
LYMPHOCYTES RELATIVE PERCENT: 22.7 % (ref 20–40)
LYMPHOCYTES RELATIVE PERCENT: 23 % (ref 20–40)
LYMPHOCYTES RELATIVE PERCENT: 30.8 % (ref 20–40)
MAGNESIUM: 1.7 MG/DL (ref 1.6–2.4)
MAGNESIUM: 1.9 MG/DL (ref 1.6–2.4)
MCH RBC QN AUTO: 28.7 PG (ref 27–31)
MCH RBC QN AUTO: 29.3 PG (ref 27–31)
MCH RBC QN AUTO: 29.4 PG (ref 27–31)
MCH RBC QN AUTO: 29.4 PG (ref 27–31)
MCH RBC QN AUTO: 29.5 PG (ref 27–31)
MCH RBC QN AUTO: 29.6 PG (ref 27–31)
MCH RBC QN AUTO: 29.6 PG (ref 27–31)
MCH RBC QN AUTO: 29.7 PG (ref 27–31)
MCHC RBC AUTO-ENTMCNC: 29.2 G/DL (ref 33–37)
MCHC RBC AUTO-ENTMCNC: 29.2 G/DL (ref 33–37)
MCHC RBC AUTO-ENTMCNC: 29.3 G/DL (ref 33–37)
MCHC RBC AUTO-ENTMCNC: 29.4 G/DL (ref 33–37)
MCHC RBC AUTO-ENTMCNC: 29.6 G/DL (ref 33–37)
MCHC RBC AUTO-ENTMCNC: 29.6 G/DL (ref 33–37)
MCHC RBC AUTO-ENTMCNC: 29.8 G/DL (ref 33–37)
MCHC RBC AUTO-ENTMCNC: 30 G/DL (ref 33–37)
MCV RBC AUTO: 100 FL (ref 81–99)
MCV RBC AUTO: 100 FL (ref 81–99)
MCV RBC AUTO: 100.6 FL (ref 81–99)
MCV RBC AUTO: 101.2 FL (ref 81–99)
MCV RBC AUTO: 101.7 FL (ref 81–99)
MCV RBC AUTO: 96.5 FL (ref 81–99)
MCV RBC AUTO: 98 FL (ref 81–99)
MCV RBC AUTO: 99.1 FL (ref 81–99)
METHEMOGLOBIN ARTERIAL: 0.9 %
METHEMOGLOBIN ARTERIAL: 1 %
MONOCYTES ABSOLUTE: 0.5 K/UL (ref 0–0.9)
MONOCYTES ABSOLUTE: 0.6 K/UL (ref 0–0.9)
MONOCYTES ABSOLUTE: 0.8 K/UL (ref 0–0.9)
MONOCYTES ABSOLUTE: 0.9 K/UL (ref 0–0.9)
MONOCYTES ABSOLUTE: 1 K/UL (ref 0–0.9)
MONOCYTES ABSOLUTE: 1.1 K/UL (ref 0–0.9)
MONOCYTES RELATIVE PERCENT: 10.1 % (ref 0–10)
MONOCYTES RELATIVE PERCENT: 12 % (ref 0–10)
MONOCYTES RELATIVE PERCENT: 5.1 % (ref 0–10)
MONOCYTES RELATIVE PERCENT: 5.6 % (ref 0–10)
MONOCYTES RELATIVE PERCENT: 7.1 % (ref 0–10)
MONOCYTES RELATIVE PERCENT: 7.8 % (ref 0–10)
MONOCYTES RELATIVE PERCENT: 7.9 % (ref 0–10)
MONOCYTES RELATIVE PERCENT: 9.6 % (ref 0–10)
NEUTROPHILS ABSOLUTE: 4.4 K/UL (ref 1.5–7.5)
NEUTROPHILS ABSOLUTE: 4.4 K/UL (ref 1.5–7.5)
NEUTROPHILS ABSOLUTE: 7 K/UL (ref 1.5–7.5)
NEUTROPHILS ABSOLUTE: 7.2 K/UL (ref 1.5–7.5)
NEUTROPHILS ABSOLUTE: 7.4 K/UL (ref 1.5–7.5)
NEUTROPHILS ABSOLUTE: 7.6 K/UL (ref 1.5–7.5)
NEUTROPHILS ABSOLUTE: 8.7 K/UL (ref 1.5–7.5)
NEUTROPHILS ABSOLUTE: 9.6 K/UL (ref 1.5–7.5)
NEUTROPHILS RELATIVE PERCENT: 49.4 % (ref 50–65)
NEUTROPHILS RELATIVE PERCENT: 58.5 % (ref 50–65)
NEUTROPHILS RELATIVE PERCENT: 67.3 % (ref 50–65)
NEUTROPHILS RELATIVE PERCENT: 67.4 % (ref 50–65)
NEUTROPHILS RELATIVE PERCENT: 68.5 % (ref 50–65)
NEUTROPHILS RELATIVE PERCENT: 71 % (ref 50–65)
NEUTROPHILS RELATIVE PERCENT: 72.5 % (ref 50–65)
NEUTROPHILS RELATIVE PERCENT: 73.6 % (ref 50–65)
NITRITE, URINE: NEGATIVE
O2 CONTENT ARTERIAL: 11.7 ML/DL
O2 CONTENT ARTERIAL: 14.8 ML/DL
O2 SAT, ARTERIAL: 80.2 %
O2 SAT, ARTERIAL: 93.7 %
O2 THERAPY: ABNORMAL
O2 THERAPY: ABNORMAL
PCO2 ARTERIAL: 40 MMHG (ref 35–45)
PCO2 ARTERIAL: 50 MMHG (ref 35–45)
PDW BLD-RTO: 15.9 % (ref 11.5–14.5)
PDW BLD-RTO: 16 % (ref 11.5–14.5)
PDW BLD-RTO: 16.7 % (ref 11.5–14.5)
PDW BLD-RTO: 16.9 % (ref 11.5–14.5)
PDW BLD-RTO: 17.2 % (ref 11.5–14.5)
PDW BLD-RTO: 17.2 % (ref 11.5–14.5)
PH ARTERIAL: 7.34 (ref 7.35–7.45)
PH ARTERIAL: 7.45 (ref 7.35–7.45)
PH UA: 5.5 (ref 5–8)
PH UA: 5.5 (ref 5–8)
PH UA: 6 (ref 5–8)
PHOSPHORUS: 3.6 MG/DL (ref 2.5–4.5)
PLATELET # BLD: 200 K/UL (ref 130–400)
PLATELET # BLD: 218 K/UL (ref 130–400)
PLATELET # BLD: 252 K/UL (ref 130–400)
PLATELET # BLD: 264 K/UL (ref 130–400)
PLATELET # BLD: 300 K/UL (ref 130–400)
PLATELET # BLD: 336 K/UL (ref 130–400)
PLATELET # BLD: 364 K/UL (ref 130–400)
PLATELET # BLD: 377 K/UL (ref 130–400)
PMV BLD AUTO: 10.1 FL (ref 9.4–12.3)
PMV BLD AUTO: 10.1 FL (ref 9.4–12.3)
PMV BLD AUTO: 10.2 FL (ref 9.4–12.3)
PMV BLD AUTO: 10.5 FL (ref 9.4–12.3)
PMV BLD AUTO: 10.9 FL (ref 9.4–12.3)
PMV BLD AUTO: 9.3 FL (ref 9.4–12.3)
PMV BLD AUTO: 9.6 FL (ref 9.4–12.3)
PMV BLD AUTO: 9.9 FL (ref 9.4–12.3)
PO2 ARTERIAL: 43 MMHG (ref 80–100)
PO2 ARTERIAL: 70 MMHG (ref 80–100)
POTASSIUM REFLEX MAGNESIUM: 3.4 MMOL/L (ref 3.5–5)
POTASSIUM REFLEX MAGNESIUM: 3.7 MMOL/L (ref 3.5–5)
POTASSIUM REFLEX MAGNESIUM: 4.3 MMOL/L (ref 3.5–5)
POTASSIUM REFLEX MAGNESIUM: 4.4 MMOL/L (ref 3.5–5)
POTASSIUM REFLEX MAGNESIUM: 4.5 MMOL/L (ref 3.5–5)
POTASSIUM REFLEX MAGNESIUM: 4.7 MMOL/L (ref 3.5–5)
POTASSIUM SERPL-SCNC: 4.1 MMOL/L (ref 3.5–5)
POTASSIUM SERPL-SCNC: 4.2 MMOL/L (ref 3.5–5)
POTASSIUM SERPL-SCNC: 4.5 MMOL/L (ref 3.5–5)
POTASSIUM, WHOLE BLOOD: 3.7
POTASSIUM, WHOLE BLOOD: 4.5
PRO-BNP: 4724 PG/ML (ref 0–1800)
PRO-BNP: 6330 PG/ML (ref 0–1800)
PRO-BNP: 6653 PG/ML (ref 0–1800)
PRO-BNP: ABNORMAL PG/ML (ref 0–1800)
PRO-BNP: ABNORMAL PG/ML (ref 0–1800)
PROTEIN UA: ABNORMAL MG/DL
PROTEIN UA: NEGATIVE MG/DL
PROTEIN UA: NEGATIVE MG/DL
PROTHROMBIN TIME: 15.2 SEC (ref 12–14.6)
RAPID INFLUENZA  B AGN: NEGATIVE
RAPID INFLUENZA A AGN: NEGATIVE
RBC # BLD: 2.87 M/UL (ref 4.2–5.4)
RBC # BLD: 3.03 M/UL (ref 4.2–5.4)
RBC # BLD: 3.27 M/UL (ref 4.2–5.4)
RBC # BLD: 3.41 M/UL (ref 4.2–5.4)
RBC # BLD: 3.42 M/UL (ref 4.2–5.4)
RBC # BLD: 3.57 M/UL (ref 4.2–5.4)
RBC # BLD: 3.76 M/UL (ref 4.2–5.4)
RBC # BLD: 3.89 M/UL (ref 4.2–5.4)
RBC UA: 1 /HPF (ref 0–4)
SODIUM BLD-SCNC: 135 MMOL/L (ref 136–145)
SODIUM BLD-SCNC: 139 MMOL/L (ref 136–145)
SODIUM BLD-SCNC: 140 MMOL/L (ref 136–145)
SODIUM BLD-SCNC: 140 MMOL/L (ref 136–145)
SODIUM BLD-SCNC: 141 MMOL/L (ref 136–145)
SODIUM BLD-SCNC: 142 MMOL/L (ref 136–145)
SODIUM BLD-SCNC: 143 MMOL/L (ref 136–145)
SODIUM BLD-SCNC: 143 MMOL/L (ref 136–145)
SODIUM BLD-SCNC: 145 MMOL/L (ref 136–145)
SPECIFIC GRAVITY UA: 1.01 (ref 1–1.03)
SPECIFIC GRAVITY UA: 1.02 (ref 1–1.03)
SPECIFIC GRAVITY UA: 1.02 (ref 1–1.03)
TOTAL IRON BINDING CAPACITY: 159 UG/DL (ref 250–400)
TOTAL PROTEIN: 6 G/DL (ref 6.6–8.7)
TOTAL PROTEIN: 6.1 G/DL (ref 6.6–8.7)
TOTAL PROTEIN: 6.2 G/DL (ref 6.6–8.7)
TOTAL PROTEIN: 6.5 G/DL (ref 6.6–8.7)
TRIGL SERPL-MCNC: 125 MG/DL (ref 0–149)
TROPONIN: 0.02 NG/ML (ref 0–0.03)
TROPONIN: 0.03 NG/ML (ref 0–0.03)
TROPONIN: 0.04 NG/ML (ref 0–0.03)
TROPONIN: 0.04 NG/ML (ref 0–0.03)
TROPONIN: 0.05 NG/ML (ref 0–0.03)
TROPONIN: 0.05 NG/ML (ref 0–0.03)
URINE CULTURE, ROUTINE: NORMAL
URINE REFLEX TO CULTURE: NORMAL
URINE REFLEX TO CULTURE: YES
UROBILINOGEN, URINE: 0.2 E.U./DL
VANCOMYCIN RANDOM: 11.5 UG/ML
VANCOMYCIN RANDOM: 7.2 UG/ML
WBC # BLD: 10.1 K/UL (ref 4.8–10.8)
WBC # BLD: 10.4 K/UL (ref 4.8–10.8)
WBC # BLD: 10.5 K/UL (ref 4.8–10.8)
WBC # BLD: 10.9 K/UL (ref 4.8–10.8)
WBC # BLD: 12.7 K/UL (ref 4.8–10.8)
WBC # BLD: 13.1 K/UL (ref 4.8–10.8)
WBC # BLD: 7.6 K/UL (ref 4.8–10.8)
WBC # BLD: 8.9 K/UL (ref 4.8–10.8)
WBC UA: 2 /HPF (ref 0–5)

## 2020-01-01 PROCEDURE — 83605 ASSAY OF LACTIC ACID: CPT

## 2020-01-01 PROCEDURE — 6370000000 HC RX 637 (ALT 250 FOR IP): Performed by: FAMILY MEDICINE

## 2020-01-01 PROCEDURE — 2580000003 HC RX 258: Performed by: EMERGENCY MEDICINE

## 2020-01-01 PROCEDURE — 36415 COLL VENOUS BLD VENIPUNCTURE: CPT

## 2020-01-01 PROCEDURE — 99999 PR OFFICE/OUTPT VISIT,PROCEDURE ONLY: CPT | Performed by: EMERGENCY MEDICINE

## 2020-01-01 PROCEDURE — G8400 PT W/DXA NO RESULTS DOC: HCPCS | Performed by: CLINICAL NURSE SPECIALIST

## 2020-01-01 PROCEDURE — 99285 EMERGENCY DEPT VISIT HI MDM: CPT

## 2020-01-01 PROCEDURE — 6370000000 HC RX 637 (ALT 250 FOR IP): Performed by: HOSPITALIST

## 2020-01-01 PROCEDURE — 97530 THERAPEUTIC ACTIVITIES: CPT

## 2020-01-01 PROCEDURE — 80202 ASSAY OF VANCOMYCIN: CPT

## 2020-01-01 PROCEDURE — G8484 FLU IMMUNIZE NO ADMIN: HCPCS | Performed by: CLINICAL NURSE SPECIALIST

## 2020-01-01 PROCEDURE — 70450 CT HEAD/BRAIN W/O DYE: CPT

## 2020-01-01 PROCEDURE — 94640 AIRWAY INHALATION TREATMENT: CPT

## 2020-01-01 PROCEDURE — 71046 X-RAY EXAM CHEST 2 VIEWS: CPT

## 2020-01-01 PROCEDURE — 36600 WITHDRAWAL OF ARTERIAL BLOOD: CPT

## 2020-01-01 PROCEDURE — 4040F PNEUMOC VAC/ADMIN/RCVD: CPT | Performed by: CLINICAL NURSE SPECIALIST

## 2020-01-01 PROCEDURE — 99281 EMR DPT VST MAYX REQ PHY/QHP: CPT

## 2020-01-01 PROCEDURE — 97116 GAIT TRAINING THERAPY: CPT

## 2020-01-01 PROCEDURE — 93005 ELECTROCARDIOGRAM TRACING: CPT | Performed by: NURSE PRACTITIONER

## 2020-01-01 PROCEDURE — 85730 THROMBOPLASTIN TIME PARTIAL: CPT

## 2020-01-01 PROCEDURE — G8427 DOCREV CUR MEDS BY ELIG CLIN: HCPCS | Performed by: CLINICAL NURSE SPECIALIST

## 2020-01-01 PROCEDURE — 1210000000 HC MED SURG R&B

## 2020-01-01 PROCEDURE — 82803 BLOOD GASES ANY COMBINATION: CPT

## 2020-01-01 PROCEDURE — 83540 ASSAY OF IRON: CPT

## 2020-01-01 PROCEDURE — 80053 COMPREHEN METABOLIC PANEL: CPT

## 2020-01-01 PROCEDURE — 97535 SELF CARE MNGMENT TRAINING: CPT

## 2020-01-01 PROCEDURE — 85025 COMPLETE CBC W/AUTO DIFF WBC: CPT

## 2020-01-01 PROCEDURE — 6360000002 HC RX W HCPCS: Performed by: HOSPITALIST

## 2020-01-01 PROCEDURE — 80061 LIPID PANEL: CPT

## 2020-01-01 PROCEDURE — 6360000004 HC RX CONTRAST MEDICATION: Performed by: EMERGENCY MEDICINE

## 2020-01-01 PROCEDURE — 97161 PT EVAL LOW COMPLEX 20 MIN: CPT

## 2020-01-01 PROCEDURE — 83550 IRON BINDING TEST: CPT

## 2020-01-01 PROCEDURE — 83880 ASSAY OF NATRIURETIC PEPTIDE: CPT

## 2020-01-01 PROCEDURE — 6360000002 HC RX W HCPCS: Performed by: EMERGENCY MEDICINE

## 2020-01-01 PROCEDURE — 2700000000 HC OXYGEN THERAPY PER DAY

## 2020-01-01 PROCEDURE — 84484 ASSAY OF TROPONIN QUANT: CPT

## 2020-01-01 PROCEDURE — G0378 HOSPITAL OBSERVATION PER HR: HCPCS

## 2020-01-01 PROCEDURE — 80048 BASIC METABOLIC PNL TOTAL CA: CPT

## 2020-01-01 PROCEDURE — G8926 SPIRO NO PERF OR DOC: HCPCS | Performed by: CLINICAL NURSE SPECIALIST

## 2020-01-01 PROCEDURE — G8417 CALC BMI ABV UP PARAM F/U: HCPCS | Performed by: CLINICAL NURSE SPECIALIST

## 2020-01-01 PROCEDURE — 94761 N-INVAS EAR/PLS OXIMETRY MLT: CPT

## 2020-01-01 PROCEDURE — 96374 THER/PROPH/DIAG INJ IV PUSH: CPT

## 2020-01-01 PROCEDURE — 1036F TOBACCO NON-USER: CPT | Performed by: CLINICAL NURSE SPECIALIST

## 2020-01-01 PROCEDURE — 2580000003 HC RX 258: Performed by: HOSPITALIST

## 2020-01-01 PROCEDURE — 94760 N-INVAS EAR/PLS OXIMETRY 1: CPT

## 2020-01-01 PROCEDURE — 87086 URINE CULTURE/COLONY COUNT: CPT

## 2020-01-01 PROCEDURE — 93000 ELECTROCARDIOGRAM COMPLETE: CPT | Performed by: CLINICAL NURSE SPECIALIST

## 2020-01-01 PROCEDURE — 81003 URINALYSIS AUTO W/O SCOPE: CPT

## 2020-01-01 PROCEDURE — 99214 OFFICE O/P EST MOD 30 MIN: CPT | Performed by: CLINICAL NURSE SPECIALIST

## 2020-01-01 PROCEDURE — 6370000000 HC RX 637 (ALT 250 FOR IP): Performed by: NURSE PRACTITIONER

## 2020-01-01 PROCEDURE — 1123F ACP DISCUSS/DSCN MKR DOCD: CPT | Performed by: CLINICAL NURSE SPECIALIST

## 2020-01-01 PROCEDURE — 92950 HEART/LUNG RESUSCITATION CPR: CPT

## 2020-01-01 PROCEDURE — 84100 ASSAY OF PHOSPHORUS: CPT

## 2020-01-01 PROCEDURE — 71045 X-RAY EXAM CHEST 1 VIEW: CPT

## 2020-01-01 PROCEDURE — 1111F DSCHRG MED/CURRENT MED MERGE: CPT | Performed by: CLINICAL NURSE SPECIALIST

## 2020-01-01 PROCEDURE — 93005 ELECTROCARDIOGRAM TRACING: CPT | Performed by: HOSPITALIST

## 2020-01-01 PROCEDURE — 87040 BLOOD CULTURE FOR BACTERIA: CPT

## 2020-01-01 PROCEDURE — 97165 OT EVAL LOW COMPLEX 30 MIN: CPT

## 2020-01-01 PROCEDURE — 6360000002 HC RX W HCPCS: Performed by: NURSE PRACTITIONER

## 2020-01-01 PROCEDURE — 6370000000 HC RX 637 (ALT 250 FOR IP)

## 2020-01-01 PROCEDURE — 97110 THERAPEUTIC EXERCISES: CPT

## 2020-01-01 PROCEDURE — 84132 ASSAY OF SERUM POTASSIUM: CPT

## 2020-01-01 PROCEDURE — 71275 CT ANGIOGRAPHY CHEST: CPT

## 2020-01-01 PROCEDURE — 87804 INFLUENZA ASSAY W/OPTIC: CPT

## 2020-01-01 PROCEDURE — 6360000002 HC RX W HCPCS: Performed by: CLINICAL NURSE SPECIALIST

## 2020-01-01 PROCEDURE — 99284 EMERGENCY DEPT VISIT MOD MDM: CPT

## 2020-01-01 PROCEDURE — 93005 ELECTROCARDIOGRAM TRACING: CPT | Performed by: EMERGENCY MEDICINE

## 2020-01-01 PROCEDURE — 3023F SPIROM DOC REV: CPT | Performed by: CLINICAL NURSE SPECIALIST

## 2020-01-01 PROCEDURE — 1090F PRES/ABSN URINE INCON ASSESS: CPT | Performed by: CLINICAL NURSE SPECIALIST

## 2020-01-01 PROCEDURE — 85610 PROTHROMBIN TIME: CPT

## 2020-01-01 PROCEDURE — 83735 ASSAY OF MAGNESIUM: CPT

## 2020-01-01 PROCEDURE — 93010 ELECTROCARDIOGRAM REPORT: CPT | Performed by: INTERNAL MEDICINE

## 2020-01-01 PROCEDURE — 81001 URINALYSIS AUTO W/SCOPE: CPT

## 2020-01-01 PROCEDURE — 82140 ASSAY OF AMMONIA: CPT

## 2020-01-01 RX ORDER — SODIUM CHLORIDE 0.9 % (FLUSH) 0.9 %
10 SYRINGE (ML) INJECTION EVERY 12 HOURS SCHEDULED
Status: DISCONTINUED | OUTPATIENT
Start: 2020-01-01 | End: 2020-01-01 | Stop reason: HOSPADM

## 2020-01-01 RX ORDER — GUAIFENESIN 600 MG/1
600 TABLET, EXTENDED RELEASE ORAL 2 TIMES DAILY
Status: DISCONTINUED | OUTPATIENT
Start: 2020-01-01 | End: 2020-01-01 | Stop reason: HOSPADM

## 2020-01-01 RX ORDER — MONTELUKAST SODIUM 10 MG/1
10 TABLET ORAL NIGHTLY
Status: DISCONTINUED | OUTPATIENT
Start: 2020-01-01 | End: 2020-01-01 | Stop reason: HOSPADM

## 2020-01-01 RX ORDER — FUROSEMIDE 40 MG/1
40 TABLET ORAL DAILY
Qty: 30 TABLET | Refills: 5 | Status: SHIPPED | OUTPATIENT
Start: 2020-01-01 | End: 2020-01-01

## 2020-01-01 RX ORDER — PREDNISONE 20 MG/1
20 TABLET ORAL DAILY
Qty: 10 TABLET | Refills: 0 | Status: SHIPPED | OUTPATIENT
Start: 2020-01-01 | End: 2020-01-01

## 2020-01-01 RX ORDER — OXYCODONE AND ACETAMINOPHEN 10; 325 MG/1; MG/1
1 TABLET ORAL EVERY 4 HOURS PRN
COMMUNITY

## 2020-01-01 RX ORDER — CARVEDILOL 3.12 MG/1
3.12 TABLET ORAL 2 TIMES DAILY WITH MEALS
Status: DISCONTINUED | OUTPATIENT
Start: 2020-01-01 | End: 2020-01-01 | Stop reason: HOSPADM

## 2020-01-01 RX ORDER — LEVOFLOXACIN 5 MG/ML
750 INJECTION, SOLUTION INTRAVENOUS
Status: DISCONTINUED | OUTPATIENT
Start: 2020-01-01 | End: 2020-01-01

## 2020-01-01 RX ORDER — POLYETHYLENE GLYCOL 3350 17 G/17G
17 POWDER, FOR SOLUTION ORAL DAILY PRN
Status: DISCONTINUED | OUTPATIENT
Start: 2020-01-01 | End: 2020-01-01 | Stop reason: HOSPADM

## 2020-01-01 RX ORDER — SPIRONOLACTONE 25 MG/1
25 TABLET ORAL DAILY
Qty: 30 TABLET | Refills: 5 | Status: SHIPPED | OUTPATIENT
Start: 2020-01-01 | End: 2020-01-01

## 2020-01-01 RX ORDER — MONTELUKAST SODIUM 10 MG/1
TABLET ORAL
Status: DISPENSED
Start: 2020-01-01 | End: 2020-01-01

## 2020-01-01 RX ORDER — ACETYLCYSTEINE 200 MG/ML
600 SOLUTION ORAL; RESPIRATORY (INHALATION) 2 TIMES DAILY
Status: DISCONTINUED | OUTPATIENT
Start: 2020-01-01 | End: 2020-01-01 | Stop reason: HOSPADM

## 2020-01-01 RX ORDER — PREDNISONE 20 MG/1
20 TABLET ORAL DAILY
Status: DISCONTINUED | OUTPATIENT
Start: 2020-01-01 | End: 2020-01-01 | Stop reason: HOSPADM

## 2020-01-01 RX ORDER — GUAIFENESIN 600 MG/1
TABLET, EXTENDED RELEASE ORAL
Status: COMPLETED
Start: 2020-01-01 | End: 2020-01-01

## 2020-01-01 RX ORDER — NITROGLYCERIN 0.4 MG/1
0.4 TABLET SUBLINGUAL EVERY 5 MIN PRN
Status: DISCONTINUED | OUTPATIENT
Start: 2020-01-01 | End: 2020-01-01 | Stop reason: HOSPADM

## 2020-01-01 RX ORDER — DOXYCYCLINE HYCLATE 100 MG
100 TABLET ORAL 2 TIMES DAILY
Qty: 20 TABLET | Refills: 0 | Status: ON HOLD | OUTPATIENT
Start: 2020-01-01 | End: 2020-01-01 | Stop reason: HOSPADM

## 2020-01-01 RX ORDER — BUDESONIDE 0.5 MG/2ML
0.5 INHALANT ORAL EVERY 12 HOURS
Status: DISCONTINUED | OUTPATIENT
Start: 2020-01-01 | End: 2020-01-01 | Stop reason: HOSPADM

## 2020-01-01 RX ORDER — SODIUM CHLORIDE 0.9 % (FLUSH) 0.9 %
10 SYRINGE (ML) INJECTION PRN
Status: DISCONTINUED | OUTPATIENT
Start: 2020-01-01 | End: 2020-01-01 | Stop reason: HOSPADM

## 2020-01-01 RX ORDER — ASPIRIN 325 MG
325 TABLET ORAL ONCE
Status: COMPLETED | OUTPATIENT
Start: 2020-01-01 | End: 2020-01-01

## 2020-01-01 RX ORDER — ATORVASTATIN CALCIUM 40 MG/1
40 TABLET, FILM COATED ORAL NIGHTLY
Status: DISCONTINUED | OUTPATIENT
Start: 2020-01-01 | End: 2020-01-01 | Stop reason: HOSPADM

## 2020-01-01 RX ORDER — POTASSIUM CHLORIDE 7.45 MG/ML
10 INJECTION INTRAVENOUS PRN
Status: DISCONTINUED | OUTPATIENT
Start: 2020-01-01 | End: 2020-01-01 | Stop reason: HOSPADM

## 2020-01-01 RX ORDER — GABAPENTIN 300 MG/1
300 CAPSULE ORAL 3 TIMES DAILY PRN
Status: DISCONTINUED | OUTPATIENT
Start: 2020-01-01 | End: 2020-01-01 | Stop reason: HOSPADM

## 2020-01-01 RX ORDER — OXYCODONE AND ACETAMINOPHEN 10; 325 MG/1; MG/1
1 TABLET ORAL 3 TIMES DAILY PRN
Status: ON HOLD | COMMUNITY
End: 2020-01-01 | Stop reason: SDUPTHER

## 2020-01-01 RX ORDER — ASPIRIN 81 MG/1
81 TABLET, CHEWABLE ORAL DAILY
Status: DISCONTINUED | OUTPATIENT
Start: 2020-01-01 | End: 2020-01-01 | Stop reason: HOSPADM

## 2020-01-01 RX ORDER — FUROSEMIDE 20 MG/1
20 TABLET ORAL DAILY
Status: DISCONTINUED | OUTPATIENT
Start: 2020-01-01 | End: 2020-01-01 | Stop reason: HOSPADM

## 2020-01-01 RX ORDER — IPRATROPIUM BROMIDE AND ALBUTEROL SULFATE 2.5; .5 MG/3ML; MG/3ML
1 SOLUTION RESPIRATORY (INHALATION) EVERY 6 HOURS PRN
Status: DISCONTINUED | OUTPATIENT
Start: 2020-01-01 | End: 2020-01-01 | Stop reason: HOSPADM

## 2020-01-01 RX ORDER — IPRATROPIUM BROMIDE AND ALBUTEROL SULFATE 2.5; .5 MG/3ML; MG/3ML
1 SOLUTION RESPIRATORY (INHALATION) ONCE
Status: COMPLETED | OUTPATIENT
Start: 2020-01-01 | End: 2020-01-01

## 2020-01-01 RX ORDER — POLYETHYLENE GLYCOL 3350 17 G/17G
17 POWDER, FOR SOLUTION ORAL DAILY PRN
Status: DISCONTINUED | OUTPATIENT
Start: 2020-01-01 | End: 2020-01-01

## 2020-01-01 RX ORDER — ATORVASTATIN CALCIUM 40 MG/1
TABLET, FILM COATED ORAL
Status: DISPENSED
Start: 2020-01-01 | End: 2020-01-01

## 2020-01-01 RX ORDER — FLUOXETINE HYDROCHLORIDE 20 MG/1
60 CAPSULE ORAL DAILY
Status: DISCONTINUED | OUTPATIENT
Start: 2020-01-01 | End: 2020-01-01 | Stop reason: HOSPADM

## 2020-01-01 RX ORDER — ACETAMINOPHEN 325 MG/1
650 TABLET ORAL EVERY 4 HOURS PRN
Status: DISCONTINUED | OUTPATIENT
Start: 2020-01-01 | End: 2020-01-01 | Stop reason: HOSPADM

## 2020-01-01 RX ORDER — MIRTAZAPINE 7.5 MG/1
7.5 TABLET, FILM COATED ORAL NIGHTLY
Status: DISCONTINUED | OUTPATIENT
Start: 2020-01-01 | End: 2020-01-01 | Stop reason: HOSPADM

## 2020-01-01 RX ORDER — LEVOFLOXACIN 500 MG/1
500 TABLET, FILM COATED ORAL
Qty: 4 TABLET | Refills: 0 | Status: SHIPPED | OUTPATIENT
Start: 2020-01-01 | End: 2020-01-01

## 2020-01-01 RX ORDER — GUAIFENESIN 600 MG/1
1200 TABLET, EXTENDED RELEASE ORAL 2 TIMES DAILY
Status: DISCONTINUED | OUTPATIENT
Start: 2020-01-01 | End: 2020-01-01 | Stop reason: HOSPADM

## 2020-01-01 RX ORDER — ASPIRIN 81 MG/1
81 TABLET ORAL DAILY
Status: DISCONTINUED | OUTPATIENT
Start: 2020-01-01 | End: 2020-01-01 | Stop reason: HOSPADM

## 2020-01-01 RX ORDER — ALBUTEROL SULFATE 2.5 MG/3ML
2.5 SOLUTION RESPIRATORY (INHALATION)
Status: DISCONTINUED | OUTPATIENT
Start: 2020-01-01 | End: 2020-01-01 | Stop reason: HOSPADM

## 2020-01-01 RX ORDER — ISOSORBIDE MONONITRATE 30 MG/1
30 TABLET, EXTENDED RELEASE ORAL DAILY
Status: DISCONTINUED | OUTPATIENT
Start: 2020-01-01 | End: 2020-01-01 | Stop reason: HOSPADM

## 2020-01-01 RX ORDER — GABAPENTIN 300 MG/1
300 CAPSULE ORAL 3 TIMES DAILY
Status: DISCONTINUED | OUTPATIENT
Start: 2020-01-01 | End: 2020-01-01 | Stop reason: HOSPADM

## 2020-01-01 RX ORDER — PREDNISONE 20 MG/1
20 TABLET ORAL 2 TIMES DAILY
Qty: 10 TABLET | Refills: 0 | Status: SHIPPED | OUTPATIENT
Start: 2020-01-01 | End: 2020-01-01

## 2020-01-01 RX ORDER — FORMOTEROL FUMARATE 20 UG/2ML
20 SOLUTION RESPIRATORY (INHALATION) EVERY 12 HOURS SCHEDULED
Status: DISCONTINUED | OUTPATIENT
Start: 2020-01-01 | End: 2020-01-01 | Stop reason: HOSPADM

## 2020-01-01 RX ORDER — ATORVASTATIN CALCIUM 40 MG/1
40 TABLET, FILM COATED ORAL NIGHTLY
Status: DISCONTINUED | OUTPATIENT
Start: 2020-01-01 | End: 2020-01-01

## 2020-01-01 RX ORDER — MAGNESIUM SULFATE 1 G/100ML
1 INJECTION INTRAVENOUS PRN
Status: DISCONTINUED | OUTPATIENT
Start: 2020-01-01 | End: 2020-01-01 | Stop reason: HOSPADM

## 2020-01-01 RX ORDER — ACETAMINOPHEN 325 MG/1
650 TABLET ORAL ONCE
Status: COMPLETED | OUTPATIENT
Start: 2020-01-01 | End: 2020-01-01

## 2020-01-01 RX ORDER — LANOLIN ALCOHOL/MO/W.PET/CERES
3 CREAM (GRAM) TOPICAL NIGHTLY PRN
Status: DISCONTINUED | OUTPATIENT
Start: 2020-01-01 | End: 2020-01-01 | Stop reason: HOSPADM

## 2020-01-01 RX ORDER — ACETYLCYSTEINE 200 MG/ML
600 SOLUTION ORAL; RESPIRATORY (INHALATION) 2 TIMES DAILY
Qty: 60 ML | Refills: 0 | Status: SHIPPED | OUTPATIENT
Start: 2020-01-01 | End: 2020-01-01

## 2020-01-01 RX ORDER — CLOPIDOGREL BISULFATE 75 MG/1
75 TABLET ORAL DAILY
Status: DISCONTINUED | OUTPATIENT
Start: 2020-01-01 | End: 2020-01-01 | Stop reason: HOSPADM

## 2020-01-01 RX ORDER — FUROSEMIDE 10 MG/ML
80 INJECTION INTRAMUSCULAR; INTRAVENOUS ONCE
Status: COMPLETED | OUTPATIENT
Start: 2020-01-01 | End: 2020-01-01

## 2020-01-01 RX ORDER — HEPARIN SODIUM 5000 [USP'U]/ML
5000 INJECTION, SOLUTION INTRAVENOUS; SUBCUTANEOUS EVERY 8 HOURS SCHEDULED
Status: DISCONTINUED | OUTPATIENT
Start: 2020-01-01 | End: 2020-01-01 | Stop reason: HOSPADM

## 2020-01-01 RX ORDER — POTASSIUM CHLORIDE 20 MEQ/1
40 TABLET, EXTENDED RELEASE ORAL PRN
Status: DISCONTINUED | OUTPATIENT
Start: 2020-01-01 | End: 2020-01-01 | Stop reason: HOSPADM

## 2020-01-01 RX ORDER — ARIPIPRAZOLE 10 MG/1
20 TABLET ORAL DAILY
Status: DISCONTINUED | OUTPATIENT
Start: 2020-01-01 | End: 2020-01-01 | Stop reason: HOSPADM

## 2020-01-01 RX ORDER — IPRATROPIUM BROMIDE AND ALBUTEROL SULFATE 2.5; .5 MG/3ML; MG/3ML
1 SOLUTION RESPIRATORY (INHALATION) EVERY 6 HOURS PRN
Qty: 360 ML | Refills: 0 | Status: SHIPPED | OUTPATIENT
Start: 2020-01-01

## 2020-01-01 RX ORDER — DOXYCYCLINE HYCLATE 100 MG/1
100 CAPSULE ORAL 2 TIMES DAILY
Status: DISCONTINUED | OUTPATIENT
Start: 2020-01-01 | End: 2020-01-01 | Stop reason: HOSPADM

## 2020-01-01 RX ORDER — OXYCODONE AND ACETAMINOPHEN 10; 325 MG/1; MG/1
1 TABLET ORAL EVERY 4 HOURS PRN
Status: DISCONTINUED | OUTPATIENT
Start: 2020-01-01 | End: 2020-01-01 | Stop reason: HOSPADM

## 2020-01-01 RX ORDER — SODIUM CHLORIDE, SODIUM LACTATE, POTASSIUM CHLORIDE, AND CALCIUM CHLORIDE .6; .31; .03; .02 G/100ML; G/100ML; G/100ML; G/100ML
1000 INJECTION, SOLUTION INTRAVENOUS ONCE
Status: COMPLETED | OUTPATIENT
Start: 2020-01-01 | End: 2020-01-01

## 2020-01-01 RX ORDER — OXYCODONE AND ACETAMINOPHEN 10; 325 MG/1; MG/1
1 TABLET ORAL 3 TIMES DAILY PRN
Qty: 9 TABLET | Refills: 0 | Status: SHIPPED | OUTPATIENT
Start: 2020-01-01 | End: 2020-01-01

## 2020-01-01 RX ORDER — GUAIFENESIN 600 MG/1
600 TABLET, EXTENDED RELEASE ORAL 2 TIMES DAILY
Qty: 30 TABLET | Refills: 0 | Status: SHIPPED | OUTPATIENT
Start: 2020-01-01 | End: 2020-01-01

## 2020-01-01 RX ORDER — LEVOFLOXACIN 5 MG/ML
750 INJECTION, SOLUTION INTRAVENOUS ONCE
Status: COMPLETED | OUTPATIENT
Start: 2020-01-01 | End: 2020-01-01

## 2020-01-01 RX ORDER — LEVOFLOXACIN 5 MG/ML
750 INJECTION, SOLUTION INTRAVENOUS EVERY 24 HOURS
Status: DISCONTINUED | OUTPATIENT
Start: 2020-01-01 | End: 2020-01-01 | Stop reason: DRUGHIGH

## 2020-01-01 RX ORDER — OXYCODONE AND ACETAMINOPHEN 10; 325 MG/1; MG/1
1 TABLET ORAL 3 TIMES DAILY PRN
Status: DISCONTINUED | OUTPATIENT
Start: 2020-01-01 | End: 2020-01-01 | Stop reason: HOSPADM

## 2020-01-01 RX ORDER — PANTOPRAZOLE SODIUM 40 MG/1
40 TABLET, DELAYED RELEASE ORAL
Status: DISCONTINUED | OUTPATIENT
Start: 2020-01-01 | End: 2020-01-01 | Stop reason: HOSPADM

## 2020-01-01 RX ORDER — FUROSEMIDE 20 MG/1
20 TABLET ORAL DAILY
Qty: 90 TABLET | Refills: 3 | Status: SHIPPED | OUTPATIENT
Start: 2020-01-01 | End: 2020-01-01

## 2020-01-01 RX ORDER — FUROSEMIDE 10 MG/ML
40 INJECTION INTRAMUSCULAR; INTRAVENOUS ONCE
Status: COMPLETED | OUTPATIENT
Start: 2020-01-01 | End: 2020-01-01

## 2020-01-01 RX ORDER — POLYETHYLENE GLYCOL 3350 17 G/17G
17 POWDER, FOR SOLUTION ORAL DAILY PRN
Qty: 527 G | Refills: 1 | Status: SHIPPED | OUTPATIENT
Start: 2020-01-01 | End: 2020-01-01

## 2020-01-01 RX ORDER — GABAPENTIN 300 MG/1
300 CAPSULE ORAL 3 TIMES DAILY PRN
COMMUNITY

## 2020-01-01 RX ORDER — FAMOTIDINE 20 MG/1
20 TABLET, FILM COATED ORAL 2 TIMES DAILY
Status: DISCONTINUED | OUTPATIENT
Start: 2020-01-01 | End: 2020-01-01 | Stop reason: HOSPADM

## 2020-01-01 RX ORDER — ONDANSETRON 2 MG/ML
4 INJECTION INTRAMUSCULAR; INTRAVENOUS EVERY 6 HOURS PRN
Status: DISCONTINUED | OUTPATIENT
Start: 2020-01-01 | End: 2020-01-01 | Stop reason: HOSPADM

## 2020-01-01 RX ORDER — NITROGLYCERIN 0.4 MG/1
0.4 TABLET SUBLINGUAL EVERY 5 MIN PRN
Status: DISCONTINUED | OUTPATIENT
Start: 2020-01-01 | End: 2020-01-01

## 2020-01-01 RX ORDER — FUROSEMIDE 10 MG/ML
20 INJECTION INTRAMUSCULAR; INTRAVENOUS ONCE
Status: COMPLETED | OUTPATIENT
Start: 2020-01-01 | End: 2020-01-01

## 2020-01-01 RX ORDER — FUROSEMIDE 40 MG/1
40 TABLET ORAL DAILY
Qty: 30 TABLET | Refills: 5 | Status: SHIPPED | OUTPATIENT
Start: 2020-01-01

## 2020-01-01 RX ORDER — HYDROXYZINE HYDROCHLORIDE 25 MG/1
25 TABLET, FILM COATED ORAL EVERY 8 HOURS PRN
Status: DISCONTINUED | OUTPATIENT
Start: 2020-01-01 | End: 2020-01-01 | Stop reason: HOSPADM

## 2020-01-01 RX ORDER — LEVOFLOXACIN 750 MG/1
750 TABLET ORAL EVERY OTHER DAY
Status: DISCONTINUED | OUTPATIENT
Start: 2020-01-01 | End: 2020-01-01 | Stop reason: HOSPADM

## 2020-01-01 RX ORDER — SENNA AND DOCUSATE SODIUM 50; 8.6 MG/1; MG/1
1 TABLET, FILM COATED ORAL DAILY
Status: DISCONTINUED | OUTPATIENT
Start: 2020-01-01 | End: 2020-01-01 | Stop reason: HOSPADM

## 2020-01-01 RX ORDER — MIRTAZAPINE 7.5 MG/1
TABLET, FILM COATED ORAL
Status: DISPENSED
Start: 2020-01-01 | End: 2020-01-01

## 2020-01-01 RX ORDER — SPIRONOLACTONE 25 MG/1
25 TABLET ORAL DAILY
Qty: 30 TABLET | Refills: 5 | Status: SHIPPED | OUTPATIENT
Start: 2020-01-01

## 2020-01-01 RX ADMIN — SENNOSIDES AND DOCUSATE SODIUM 1 TABLET: 8.6; 5 TABLET ORAL at 08:02

## 2020-01-01 RX ADMIN — ACETYLCYSTEINE 600 MG: 200 SOLUTION ORAL; RESPIRATORY (INHALATION) at 07:01

## 2020-01-01 RX ADMIN — ACETYLCYSTEINE 600 MG: 200 SOLUTION ORAL; RESPIRATORY (INHALATION) at 06:30

## 2020-01-01 RX ADMIN — CEFEPIME HYDROCHLORIDE 2 G: 2 INJECTION, POWDER, FOR SOLUTION INTRAVENOUS at 22:41

## 2020-01-01 RX ADMIN — BUDESONIDE 500 MCG: 0.5 INHALANT RESPIRATORY (INHALATION) at 07:00

## 2020-01-01 RX ADMIN — GUAIFENESIN 1200 MG: 600 TABLET, EXTENDED RELEASE ORAL at 20:41

## 2020-01-01 RX ADMIN — ATORVASTATIN CALCIUM 40 MG: 40 TABLET, FILM COATED ORAL at 21:14

## 2020-01-01 RX ADMIN — IPRATROPIUM BROMIDE 0.5 MG: 0.5 SOLUTION RESPIRATORY (INHALATION) at 18:35

## 2020-01-01 RX ADMIN — SODIUM CHLORIDE, PRESERVATIVE FREE 10 ML: 5 INJECTION INTRAVENOUS at 09:35

## 2020-01-01 RX ADMIN — FLUOXETINE HYDROCHLORIDE 60 MG: 20 CAPSULE ORAL at 08:52

## 2020-01-01 RX ADMIN — DOXYCYCLINE HYCLATE 100 MG: 100 CAPSULE ORAL at 21:17

## 2020-01-01 RX ADMIN — Medication 1000 MG: at 23:26

## 2020-01-01 RX ADMIN — CARVEDILOL 3.12 MG: 3.12 TABLET, FILM COATED ORAL at 18:22

## 2020-01-01 RX ADMIN — CLOPIDOGREL BISULFATE 75 MG: 75 TABLET ORAL at 09:24

## 2020-01-01 RX ADMIN — NITROGLYCERIN 0.4 MG: 0.4 TABLET, ORALLY DISINTEGRATING SUBLINGUAL at 11:45

## 2020-01-01 RX ADMIN — ATORVASTATIN CALCIUM 40 MG: 40 TABLET, FILM COATED ORAL at 21:41

## 2020-01-01 RX ADMIN — MONTELUKAST SODIUM 10 MG: 10 TABLET, FILM COATED ORAL at 02:11

## 2020-01-01 RX ADMIN — GABAPENTIN 300 MG: 300 CAPSULE ORAL at 14:57

## 2020-01-01 RX ADMIN — OXYCODONE HYDROCHLORIDE AND ACETAMINOPHEN 1 TABLET: 10; 325 TABLET ORAL at 21:26

## 2020-01-01 RX ADMIN — FLUOXETINE HYDROCHLORIDE 60 MG: 20 CAPSULE ORAL at 09:34

## 2020-01-01 RX ADMIN — SODIUM CHLORIDE, PRESERVATIVE FREE 10 ML: 5 INJECTION INTRAVENOUS at 08:53

## 2020-01-01 RX ADMIN — BUDESONIDE 500 MCG: 0.5 INHALANT RESPIRATORY (INHALATION) at 18:30

## 2020-01-01 RX ADMIN — CLOPIDOGREL BISULFATE 75 MG: 75 TABLET ORAL at 09:45

## 2020-01-01 RX ADMIN — ASPIRIN 81 MG: 81 TABLET, COATED ORAL at 09:34

## 2020-01-01 RX ADMIN — LEVOFLOXACIN 750 MG: 5 INJECTION, SOLUTION INTRAVENOUS at 21:40

## 2020-01-01 RX ADMIN — ASPIRIN 81 MG: 81 TABLET, COATED ORAL at 09:24

## 2020-01-01 RX ADMIN — GUAIFENESIN 1200 MG: 600 TABLET, EXTENDED RELEASE ORAL at 08:02

## 2020-01-01 RX ADMIN — GABAPENTIN 300 MG: 300 CAPSULE ORAL at 21:26

## 2020-01-01 RX ADMIN — HYDROCORTISONE 2.5%: 25 CREAM TOPICAL at 21:18

## 2020-01-01 RX ADMIN — BUDESONIDE 500 MCG: 0.5 INHALANT RESPIRATORY (INHALATION) at 18:18

## 2020-01-01 RX ADMIN — ISOSORBIDE MONONITRATE 30 MG: 30 TABLET, EXTENDED RELEASE ORAL at 08:17

## 2020-01-01 RX ADMIN — FORMOTEROL FUMARATE DIHYDRATE 20 MCG: 20 SOLUTION RESPIRATORY (INHALATION) at 18:51

## 2020-01-01 RX ADMIN — IPRATROPIUM BROMIDE AND ALBUTEROL SULFATE 3 ML: .5; 3 SOLUTION RESPIRATORY (INHALATION) at 07:33

## 2020-01-01 RX ADMIN — FORMOTEROL FUMARATE DIHYDRATE 20 MCG: 20 SOLUTION RESPIRATORY (INHALATION) at 19:50

## 2020-01-01 RX ADMIN — ASPIRIN 81 MG: 81 TABLET, COATED ORAL at 07:59

## 2020-01-01 RX ADMIN — GUAIFENESIN 600 MG: 600 TABLET, EXTENDED RELEASE ORAL at 08:18

## 2020-01-01 RX ADMIN — IPRATROPIUM BROMIDE 0.5 MG: 0.5 SOLUTION RESPIRATORY (INHALATION) at 06:57

## 2020-01-01 RX ADMIN — CLOPIDOGREL BISULFATE 75 MG: 75 TABLET ORAL at 08:18

## 2020-01-01 RX ADMIN — BUDESONIDE 500 MCG: 0.5 INHALANT RESPIRATORY (INHALATION) at 18:51

## 2020-01-01 RX ADMIN — IPRATROPIUM BROMIDE 0.5 MG: 0.5 SOLUTION RESPIRATORY (INHALATION) at 19:37

## 2020-01-01 RX ADMIN — GABAPENTIN 300 MG: 300 CAPSULE ORAL at 09:00

## 2020-01-01 RX ADMIN — OXYCODONE HYDROCHLORIDE AND ACETAMINOPHEN 1 TABLET: 10; 325 TABLET ORAL at 17:17

## 2020-01-01 RX ADMIN — CLOPIDOGREL BISULFATE 75 MG: 75 TABLET ORAL at 08:02

## 2020-01-01 RX ADMIN — MIRTAZAPINE 7.5 MG: 7.5 TABLET ORAL at 21:16

## 2020-01-01 RX ADMIN — SODIUM CHLORIDE, PRESERVATIVE FREE 10 ML: 5 INJECTION INTRAVENOUS at 21:18

## 2020-01-01 RX ADMIN — ASPIRIN 81 MG: 81 TABLET, COATED ORAL at 08:49

## 2020-01-01 RX ADMIN — HEPARIN SODIUM 5000 UNITS: 5000 INJECTION INTRAVENOUS; SUBCUTANEOUS at 21:17

## 2020-01-01 RX ADMIN — HEPARIN SODIUM 5000 UNITS: 5000 INJECTION INTRAVENOUS; SUBCUTANEOUS at 14:25

## 2020-01-01 RX ADMIN — BUDESONIDE 500 MCG: 0.5 INHALANT RESPIRATORY (INHALATION) at 07:01

## 2020-01-01 RX ADMIN — PREDNISONE 20 MG: 20 TABLET ORAL at 09:24

## 2020-01-01 RX ADMIN — PANTOPRAZOLE SODIUM 40 MG: 40 TABLET, DELAYED RELEASE ORAL at 06:47

## 2020-01-01 RX ADMIN — ACETAMINOPHEN 650 MG: 325 TABLET ORAL at 19:00

## 2020-01-01 RX ADMIN — CARVEDILOL 3.12 MG: 3.12 TABLET, FILM COATED ORAL at 09:33

## 2020-01-01 RX ADMIN — ARIPIPRAZOLE 20 MG: 10 TABLET ORAL at 09:24

## 2020-01-01 RX ADMIN — SODIUM CHLORIDE, PRESERVATIVE FREE 10 ML: 5 INJECTION INTRAVENOUS at 08:18

## 2020-01-01 RX ADMIN — PANTOPRAZOLE SODIUM 40 MG: 40 TABLET, DELAYED RELEASE ORAL at 05:47

## 2020-01-01 RX ADMIN — IPRATROPIUM BROMIDE 0.5 MG: 0.5 SOLUTION RESPIRATORY (INHALATION) at 10:35

## 2020-01-01 RX ADMIN — FAMOTIDINE 20 MG: 20 TABLET ORAL at 08:18

## 2020-01-01 RX ADMIN — GUAIFENESIN 1200 MG: 600 TABLET, EXTENDED RELEASE ORAL at 02:07

## 2020-01-01 RX ADMIN — IPRATROPIUM BROMIDE 0.5 MG: 0.5 SOLUTION RESPIRATORY (INHALATION) at 14:56

## 2020-01-01 RX ADMIN — HEPARIN SODIUM 5000 UNITS: 5000 INJECTION INTRAVENOUS; SUBCUTANEOUS at 06:37

## 2020-01-01 RX ADMIN — MIRTAZAPINE 7.5 MG: 7.5 TABLET ORAL at 20:41

## 2020-01-01 RX ADMIN — GABAPENTIN 300 MG: 300 CAPSULE ORAL at 08:17

## 2020-01-01 RX ADMIN — SENNOSIDES AND DOCUSATE SODIUM 1 TABLET: 8.6; 5 TABLET ORAL at 08:51

## 2020-01-01 RX ADMIN — ACETYLCYSTEINE 600 MG: 200 SOLUTION ORAL; RESPIRATORY (INHALATION) at 19:36

## 2020-01-01 RX ADMIN — HEPARIN SODIUM 5000 UNITS: 5000 INJECTION INTRAVENOUS; SUBCUTANEOUS at 14:47

## 2020-01-01 RX ADMIN — SODIUM CHLORIDE, PRESERVATIVE FREE 10 ML: 5 INJECTION INTRAVENOUS at 07:59

## 2020-01-01 RX ADMIN — DOXYCYCLINE HYCLATE 100 MG: 100 CAPSULE ORAL at 08:17

## 2020-01-01 RX ADMIN — PANTOPRAZOLE SODIUM 40 MG: 40 TABLET, DELAYED RELEASE ORAL at 06:37

## 2020-01-01 RX ADMIN — GUAIFENESIN 1200 MG: 600 TABLET, EXTENDED RELEASE ORAL at 09:33

## 2020-01-01 RX ADMIN — CEFEPIME HYDROCHLORIDE 2 G: 2 INJECTION, POWDER, FOR SOLUTION INTRAVENOUS at 21:42

## 2020-01-01 RX ADMIN — FLUOXETINE HYDROCHLORIDE 60 MG: 20 CAPSULE ORAL at 09:24

## 2020-01-01 RX ADMIN — GUAIFENESIN 1200 MG: 600 TABLET, EXTENDED RELEASE ORAL at 08:52

## 2020-01-01 RX ADMIN — MIRTAZAPINE 7.5 MG: 7.5 TABLET ORAL at 21:26

## 2020-01-01 RX ADMIN — CARVEDILOL 3.12 MG: 3.12 TABLET, FILM COATED ORAL at 08:18

## 2020-01-01 RX ADMIN — BUDESONIDE 500 MCG: 0.5 INHALANT RESPIRATORY (INHALATION) at 06:53

## 2020-01-01 RX ADMIN — VANCOMYCIN HYDROCHLORIDE 750 MG: 750 INJECTION, POWDER, LYOPHILIZED, FOR SOLUTION INTRAVENOUS at 04:37

## 2020-01-01 RX ADMIN — SODIUM CHLORIDE, POTASSIUM CHLORIDE, SODIUM LACTATE AND CALCIUM CHLORIDE 1000 ML: 600; 310; 30; 20 INJECTION, SOLUTION INTRAVENOUS at 23:25

## 2020-01-01 RX ADMIN — GABAPENTIN 300 MG: 300 CAPSULE ORAL at 18:52

## 2020-01-01 RX ADMIN — LEVOFLOXACIN 750 MG: 5 INJECTION, SOLUTION INTRAVENOUS at 22:41

## 2020-01-01 RX ADMIN — ACETYLCYSTEINE 600 MG: 200 SOLUTION ORAL; RESPIRATORY (INHALATION) at 18:39

## 2020-01-01 RX ADMIN — FUROSEMIDE 40 MG: 10 INJECTION, SOLUTION INTRAMUSCULAR; INTRAVENOUS at 14:28

## 2020-01-01 RX ADMIN — PREDNISONE 20 MG: 20 TABLET ORAL at 09:45

## 2020-01-01 RX ADMIN — PANTOPRAZOLE SODIUM 40 MG: 40 TABLET, DELAYED RELEASE ORAL at 06:01

## 2020-01-01 RX ADMIN — HEPARIN SODIUM 5000 UNITS: 5000 INJECTION INTRAVENOUS; SUBCUTANEOUS at 21:27

## 2020-01-01 RX ADMIN — FORMOTEROL FUMARATE DIHYDRATE 20 MCG: 20 SOLUTION RESPIRATORY (INHALATION) at 06:58

## 2020-01-01 RX ADMIN — IPRATROPIUM BROMIDE 0.5 MG: 0.5 SOLUTION RESPIRATORY (INHALATION) at 18:39

## 2020-01-01 RX ADMIN — FUROSEMIDE 80 MG: 10 INJECTION, SOLUTION INTRAMUSCULAR; INTRAVENOUS at 11:23

## 2020-01-01 RX ADMIN — GUAIFENESIN 1200 MG: 600 TABLET, EXTENDED RELEASE ORAL at 21:26

## 2020-01-01 RX ADMIN — PANTOPRAZOLE SODIUM 40 MG: 40 TABLET, DELAYED RELEASE ORAL at 04:38

## 2020-01-01 RX ADMIN — Medication 1000 MG: at 01:36

## 2020-01-01 RX ADMIN — CEFEPIME HYDROCHLORIDE 2 G: 2 INJECTION, POWDER, FOR SOLUTION INTRAVENOUS at 11:15

## 2020-01-01 RX ADMIN — CARVEDILOL 3.12 MG: 3.12 TABLET, FILM COATED ORAL at 07:59

## 2020-01-01 RX ADMIN — MONTELUKAST SODIUM 10 MG: 10 TABLET, FILM COATED ORAL at 21:16

## 2020-01-01 RX ADMIN — ACETAMINOPHEN 650 MG: 325 TABLET ORAL at 03:07

## 2020-01-01 RX ADMIN — HEPARIN SODIUM 5000 UNITS: 5000 INJECTION INTRAVENOUS; SUBCUTANEOUS at 05:47

## 2020-01-01 RX ADMIN — CEFEPIME HYDROCHLORIDE 2 G: 2 INJECTION, POWDER, FOR SOLUTION INTRAVENOUS at 10:02

## 2020-01-01 RX ADMIN — ACETAMINOPHEN 650 MG: 325 TABLET ORAL at 14:29

## 2020-01-01 RX ADMIN — HEPARIN SODIUM 5000 UNITS: 5000 INJECTION INTRAVENOUS; SUBCUTANEOUS at 04:38

## 2020-01-01 RX ADMIN — FUROSEMIDE 20 MG: 20 TABLET ORAL at 08:18

## 2020-01-01 RX ADMIN — CEFEPIME HYDROCHLORIDE 2 G: 2 INJECTION, POWDER, FOR SOLUTION INTRAVENOUS at 09:34

## 2020-01-01 RX ADMIN — ISOSORBIDE MONONITRATE 30 MG: 30 TABLET, EXTENDED RELEASE ORAL at 09:24

## 2020-01-01 RX ADMIN — IPRATROPIUM BROMIDE AND ALBUTEROL SULFATE 3 ML: .5; 3 SOLUTION RESPIRATORY (INHALATION) at 18:17

## 2020-01-01 RX ADMIN — HEPARIN SODIUM 5000 UNITS: 5000 INJECTION INTRAVENOUS; SUBCUTANEOUS at 05:57

## 2020-01-01 RX ADMIN — PREDNISONE 20 MG: 20 TABLET ORAL at 08:02

## 2020-01-01 RX ADMIN — IPRATROPIUM BROMIDE 0.5 MG: 0.5 SOLUTION RESPIRATORY (INHALATION) at 10:41

## 2020-01-01 RX ADMIN — BUDESONIDE 500 MCG: 0.5 INHALANT RESPIRATORY (INHALATION) at 06:57

## 2020-01-01 RX ADMIN — IPRATROPIUM BROMIDE 0.5 MG: 0.5 SOLUTION RESPIRATORY (INHALATION) at 06:30

## 2020-01-01 RX ADMIN — ACETYLCYSTEINE 600 MG: 200 SOLUTION ORAL; RESPIRATORY (INHALATION) at 07:04

## 2020-01-01 RX ADMIN — CLOPIDOGREL BISULFATE 75 MG: 75 TABLET ORAL at 09:34

## 2020-01-01 RX ADMIN — IPRATROPIUM BROMIDE 0.5 MG: 0.5 SOLUTION RESPIRATORY (INHALATION) at 07:01

## 2020-01-01 RX ADMIN — MIRTAZAPINE 7.5 MG: 7.5 TABLET ORAL at 21:41

## 2020-01-01 RX ADMIN — POLYETHYLENE GLYCOL 3350 17 G: 17 POWDER, FOR SOLUTION ORAL at 14:34

## 2020-01-01 RX ADMIN — CEFEPIME HYDROCHLORIDE 2 G: 2 INJECTION, POWDER, FOR SOLUTION INTRAVENOUS at 21:17

## 2020-01-01 RX ADMIN — HEPARIN SODIUM 5000 UNITS: 5000 INJECTION INTRAVENOUS; SUBCUTANEOUS at 20:41

## 2020-01-01 RX ADMIN — ISOSORBIDE MONONITRATE 30 MG: 30 TABLET, EXTENDED RELEASE ORAL at 07:59

## 2020-01-01 RX ADMIN — MONTELUKAST SODIUM 10 MG: 10 TABLET, FILM COATED ORAL at 21:41

## 2020-01-01 RX ADMIN — ISOSORBIDE MONONITRATE 30 MG: 30 TABLET, EXTENDED RELEASE ORAL at 08:51

## 2020-01-01 RX ADMIN — IPRATROPIUM BROMIDE 0.5 MG: 0.5 SOLUTION RESPIRATORY (INHALATION) at 10:49

## 2020-01-01 RX ADMIN — FORMOTEROL FUMARATE DIHYDRATE 20 MCG: 20 SOLUTION RESPIRATORY (INHALATION) at 07:04

## 2020-01-01 RX ADMIN — SENNOSIDES AND DOCUSATE SODIUM 1 TABLET: 8.6; 5 TABLET ORAL at 09:24

## 2020-01-01 RX ADMIN — ASPIRIN 81 MG 81 MG: 81 TABLET ORAL at 08:18

## 2020-01-01 RX ADMIN — CEFEPIME HYDROCHLORIDE 2 G: 2 INJECTION, POWDER, FOR SOLUTION INTRAVENOUS at 10:24

## 2020-01-01 RX ADMIN — ASPIRIN 325 MG: 325 TABLET, FILM COATED ORAL at 11:45

## 2020-01-01 RX ADMIN — CARVEDILOL 3.12 MG: 3.12 TABLET, FILM COATED ORAL at 17:14

## 2020-01-01 RX ADMIN — MONTELUKAST SODIUM 10 MG: 10 TABLET, FILM COATED ORAL at 20:41

## 2020-01-01 RX ADMIN — SODIUM CHLORIDE, PRESERVATIVE FREE 10 ML: 5 INJECTION INTRAVENOUS at 09:24

## 2020-01-01 RX ADMIN — FORMOTEROL FUMARATE DIHYDRATE 20 MCG: 20 SOLUTION RESPIRATORY (INHALATION) at 07:01

## 2020-01-01 RX ADMIN — GUAIFENESIN 1200 MG: 600 TABLET, EXTENDED RELEASE ORAL at 09:23

## 2020-01-01 RX ADMIN — FUROSEMIDE 20 MG: 10 INJECTION, SOLUTION INTRAMUSCULAR; INTRAVENOUS at 11:51

## 2020-01-01 RX ADMIN — ACETYLCYSTEINE 600 MG: 200 SOLUTION ORAL; RESPIRATORY (INHALATION) at 07:08

## 2020-01-01 RX ADMIN — HEPARIN SODIUM 5000 UNITS: 5000 INJECTION INTRAVENOUS; SUBCUTANEOUS at 21:42

## 2020-01-01 RX ADMIN — MIRTAZAPINE 7.5 MG: 7.5 TABLET ORAL at 02:11

## 2020-01-01 RX ADMIN — ASPIRIN 81 MG: 81 TABLET, COATED ORAL at 09:45

## 2020-01-01 RX ADMIN — CARVEDILOL: 3.12 TABLET, FILM COATED ORAL at 17:41

## 2020-01-01 RX ADMIN — FAMOTIDINE 20 MG: 20 TABLET ORAL at 21:17

## 2020-01-01 RX ADMIN — HEPARIN SODIUM 5000 UNITS: 5000 INJECTION INTRAVENOUS; SUBCUTANEOUS at 06:47

## 2020-01-01 RX ADMIN — ACETYLCYSTEINE 600 MG: 200 SOLUTION ORAL; RESPIRATORY (INHALATION) at 18:17

## 2020-01-01 RX ADMIN — ACETYLCYSTEINE 600 MG: 200 SOLUTION ORAL; RESPIRATORY (INHALATION) at 07:33

## 2020-01-01 RX ADMIN — IPRATROPIUM BROMIDE 0.5 MG: 0.5 SOLUTION RESPIRATORY (INHALATION) at 07:03

## 2020-01-01 RX ADMIN — MONTELUKAST SODIUM 10 MG: 10 TABLET, FILM COATED ORAL at 21:26

## 2020-01-01 RX ADMIN — FLUOXETINE HYDROCHLORIDE 60 MG: 20 CAPSULE ORAL at 07:59

## 2020-01-01 RX ADMIN — ACETAMINOPHEN 650 MG: 325 TABLET ORAL at 04:37

## 2020-01-01 RX ADMIN — CEFEPIME HYDROCHLORIDE 2 G: 2 INJECTION, POWDER, FOR SOLUTION INTRAVENOUS at 21:26

## 2020-01-01 RX ADMIN — ACETYLCYSTEINE 600 MG: 200 SOLUTION ORAL; RESPIRATORY (INHALATION) at 18:28

## 2020-01-01 RX ADMIN — CLOPIDOGREL BISULFATE 75 MG: 75 TABLET ORAL at 08:49

## 2020-01-01 RX ADMIN — SENNOSIDES AND DOCUSATE SODIUM 1 TABLET: 8.6; 5 TABLET ORAL at 09:34

## 2020-01-01 RX ADMIN — IPRATROPIUM BROMIDE 0.5 MG: 0.5 SOLUTION RESPIRATORY (INHALATION) at 14:26

## 2020-01-01 RX ADMIN — CARVEDILOL 3.12 MG: 3.12 TABLET, FILM COATED ORAL at 09:24

## 2020-01-01 RX ADMIN — IPRATROPIUM BROMIDE AND ALBUTEROL SULFATE 1 AMPULE: .5; 3 SOLUTION RESPIRATORY (INHALATION) at 09:42

## 2020-01-01 RX ADMIN — PANTOPRAZOLE SODIUM 40 MG: 40 TABLET, DELAYED RELEASE ORAL at 05:57

## 2020-01-01 RX ADMIN — GUAIFENESIN 1200 MG: 600 TABLET, EXTENDED RELEASE ORAL at 21:41

## 2020-01-01 RX ADMIN — ACETAMINOPHEN 650 MG: 325 TABLET ORAL at 08:59

## 2020-01-01 RX ADMIN — ARIPIPRAZOLE 20 MG: 10 TABLET ORAL at 08:53

## 2020-01-01 RX ADMIN — ISOSORBIDE MONONITRATE 30 MG: 30 TABLET, EXTENDED RELEASE ORAL at 09:33

## 2020-01-01 RX ADMIN — PREDNISONE 20 MG: 20 TABLET ORAL at 08:49

## 2020-01-01 RX ADMIN — CLOPIDOGREL BISULFATE 75 MG: 75 TABLET ORAL at 18:52

## 2020-01-01 RX ADMIN — ACETAMINOPHEN 650 MG: 325 TABLET ORAL at 11:45

## 2020-01-01 RX ADMIN — ACETYLCYSTEINE 600 MG: 200 SOLUTION ORAL; RESPIRATORY (INHALATION) at 18:35

## 2020-01-01 RX ADMIN — ARIPIPRAZOLE 20 MG: 10 TABLET ORAL at 09:36

## 2020-01-01 RX ADMIN — IPRATROPIUM BROMIDE 0.5 MG: 0.5 SOLUTION RESPIRATORY (INHALATION) at 18:28

## 2020-01-01 RX ADMIN — CARVEDILOL 3.12 MG: 3.12 TABLET, FILM COATED ORAL at 18:11

## 2020-01-01 RX ADMIN — MIRTAZAPINE 7.5 MG: 7.5 TABLET ORAL at 21:17

## 2020-01-01 RX ADMIN — OXYCODONE HYDROCHLORIDE AND ACETAMINOPHEN 1 TABLET: 10; 325 TABLET ORAL at 13:57

## 2020-01-01 RX ADMIN — GABAPENTIN 300 MG: 300 CAPSULE ORAL at 13:57

## 2020-01-01 RX ADMIN — ACETYLCYSTEINE 600 MG: 200 SOLUTION ORAL; RESPIRATORY (INHALATION) at 06:57

## 2020-01-01 RX ADMIN — IPRATROPIUM BROMIDE 0.5 MG: 0.5 SOLUTION RESPIRATORY (INHALATION) at 14:40

## 2020-01-01 RX ADMIN — IPRATROPIUM BROMIDE 0.5 MG: 0.5 SOLUTION RESPIRATORY (INHALATION) at 07:08

## 2020-01-01 RX ADMIN — BUDESONIDE 500 MCG: 0.5 INHALANT RESPIRATORY (INHALATION) at 07:04

## 2020-01-01 RX ADMIN — FORMOTEROL FUMARATE DIHYDRATE 20 MCG: 20 SOLUTION RESPIRATORY (INHALATION) at 07:00

## 2020-01-01 RX ADMIN — ATORVASTATIN CALCIUM 40 MG: 40 TABLET, FILM COATED ORAL at 02:07

## 2020-01-01 RX ADMIN — GUAIFENESIN 1200 MG: 600 TABLET, EXTENDED RELEASE ORAL at 21:17

## 2020-01-01 RX ADMIN — ARIPIPRAZOLE 20 MG: 10 TABLET ORAL at 07:59

## 2020-01-01 RX ADMIN — OXYCODONE HYDROCHLORIDE AND ACETAMINOPHEN 1 TABLET: 10; 325 TABLET ORAL at 14:57

## 2020-01-01 RX ADMIN — FORMOTEROL FUMARATE DIHYDRATE 20 MCG: 20 SOLUTION RESPIRATORY (INHALATION) at 18:17

## 2020-01-01 RX ADMIN — CARVEDILOL 3.12 MG: 3.12 TABLET, FILM COATED ORAL at 08:16

## 2020-01-01 RX ADMIN — ATORVASTATIN CALCIUM 40 MG: 40 TABLET, FILM COATED ORAL at 21:17

## 2020-01-01 RX ADMIN — OXYCODONE HYDROCHLORIDE AND ACETAMINOPHEN 1 TABLET: 10; 325 TABLET ORAL at 11:35

## 2020-01-01 RX ADMIN — SODIUM CHLORIDE, PRESERVATIVE FREE 10 ML: 5 INJECTION INTRAVENOUS at 09:50

## 2020-01-01 RX ADMIN — SODIUM CHLORIDE, PRESERVATIVE FREE 10 ML: 5 INJECTION INTRAVENOUS at 11:16

## 2020-01-01 RX ADMIN — ATORVASTATIN CALCIUM 40 MG: 40 TABLET, FILM COATED ORAL at 21:26

## 2020-01-01 RX ADMIN — PREDNISONE 20 MG: 20 TABLET ORAL at 09:38

## 2020-01-01 RX ADMIN — ATORVASTATIN CALCIUM 40 MG: 40 TABLET, FILM COATED ORAL at 20:41

## 2020-01-01 RX ADMIN — FORMOTEROL FUMARATE DIHYDRATE 20 MCG: 20 SOLUTION RESPIRATORY (INHALATION) at 18:30

## 2020-01-01 RX ADMIN — GUAIFENESIN 600 MG: 600 TABLET, EXTENDED RELEASE ORAL at 21:17

## 2020-01-01 RX ADMIN — GABAPENTIN 300 MG: 300 CAPSULE ORAL at 21:17

## 2020-01-01 RX ADMIN — FORMOTEROL FUMARATE DIHYDRATE 20 MCG: 20 SOLUTION RESPIRATORY (INHALATION) at 06:53

## 2020-01-01 RX ADMIN — LEVOFLOXACIN 750 MG: 750 TABLET, FILM COATED ORAL at 18:22

## 2020-01-01 RX ADMIN — ISOSORBIDE MONONITRATE 30 MG: 30 TABLET, EXTENDED RELEASE ORAL at 09:44

## 2020-01-01 RX ADMIN — ACETAMINOPHEN 650 MG: 325 TABLET ORAL at 23:05

## 2020-01-01 RX ADMIN — BUDESONIDE 500 MCG: 0.5 INHALANT RESPIRATORY (INHALATION) at 19:50

## 2020-01-01 RX ADMIN — GUAIFENESIN 1200 MG: 600 TABLET, EXTENDED RELEASE ORAL at 09:45

## 2020-01-01 RX ADMIN — IOPAMIDOL 90 ML: 755 INJECTION, SOLUTION INTRAVENOUS at 23:37

## 2020-01-01 ASSESSMENT — PAIN SCALES - GENERAL
PAINLEVEL_OUTOF10: 0
PAINLEVEL_OUTOF10: 1
PAINLEVEL_OUTOF10: 0
PAINLEVEL_OUTOF10: 0
PAINLEVEL_OUTOF10: 5
PAINLEVEL_OUTOF10: 10
PAINLEVEL_OUTOF10: 0
PAINLEVEL_OUTOF10: 4
PAINLEVEL_OUTOF10: 10
PAINLEVEL_OUTOF10: 10
PAINLEVEL_OUTOF10: 1
PAINLEVEL_OUTOF10: 0
PAINLEVEL_OUTOF10: 5
PAINLEVEL_OUTOF10: 0
PAINLEVEL_OUTOF10: 5
PAINLEVEL_OUTOF10: 5
PAINLEVEL_OUTOF10: 0
PAINLEVEL_OUTOF10: 10
PAINLEVEL_OUTOF10: 5
PAINLEVEL_OUTOF10: 0
PAINLEVEL_OUTOF10: 2
PAINLEVEL_OUTOF10: 8
PAINLEVEL_OUTOF10: 5
PAINLEVEL_OUTOF10: 6
PAINLEVEL_OUTOF10: 0
PAINLEVEL_OUTOF10: 5
PAINLEVEL_OUTOF10: 2
PAINLEVEL_OUTOF10: 4
PAINLEVEL_OUTOF10: 0
PAINLEVEL_OUTOF10: 0
PAINLEVEL_OUTOF10: 2
PAINLEVEL_OUTOF10: 7
PAINLEVEL_OUTOF10: 10
PAINLEVEL_OUTOF10: 0

## 2020-01-01 ASSESSMENT — ENCOUNTER SYMPTOMS
SHORTNESS OF BREATH: 1
COUGH: 0
VOMITING: 0
VOICE CHANGE: 0
NAUSEA: 0
GASTROINTESTINAL NEGATIVE: 1
EYE REDNESS: 0
CHEST TIGHTNESS: 1
SHORTNESS OF BREATH: 1
ABDOMINAL PAIN: 0
EYES NEGATIVE: 1
EYE REDNESS: 0
CHEST TIGHTNESS: 0
WHEEZING: 0
ABDOMINAL PAIN: 0
SHORTNESS OF BREATH: 0
EYE PAIN: 0
ALLERGIC/IMMUNOLOGIC NEGATIVE: 1
GASTROINTESTINAL NEGATIVE: 1
SHORTNESS OF BREATH: 1
VOMITING: 0
WHEEZING: 0
VOMITING: 0
COUGH: 0
CHEST TIGHTNESS: 0
ABDOMINAL PAIN: 0
COUGH: 0
FACIAL SWELLING: 0
ABDOMINAL PAIN: 0
FACIAL SWELLING: 0
EYE REDNESS: 0
SHORTNESS OF BREATH: 1
RHINORRHEA: 0
NAUSEA: 0
DIARRHEA: 0

## 2020-01-01 ASSESSMENT — PAIN DESCRIPTION - PROGRESSION
CLINICAL_PROGRESSION: NOT CHANGED

## 2020-01-01 ASSESSMENT — PAIN - FUNCTIONAL ASSESSMENT
PAIN_FUNCTIONAL_ASSESSMENT: PREVENTS OR INTERFERES SOME ACTIVE ACTIVITIES AND ADLS
PAIN_FUNCTIONAL_ASSESSMENT: ACTIVITIES ARE NOT PREVENTED
PAIN_FUNCTIONAL_ASSESSMENT: PREVENTS OR INTERFERES SOME ACTIVE ACTIVITIES AND ADLS
PAIN_FUNCTIONAL_ASSESSMENT: ACTIVITIES ARE NOT PREVENTED

## 2020-01-01 ASSESSMENT — PAIN DESCRIPTION - FREQUENCY
FREQUENCY: CONTINUOUS

## 2020-01-01 ASSESSMENT — PAIN DESCRIPTION - ORIENTATION
ORIENTATION: RIGHT;LEFT
ORIENTATION: LEFT;RIGHT
ORIENTATION: RIGHT;LEFT
ORIENTATION: OTHER (COMMENT)

## 2020-01-01 ASSESSMENT — PAIN DESCRIPTION - PAIN TYPE
TYPE: ACUTE PAIN
TYPE: CHRONIC PAIN
TYPE: ACUTE PAIN
TYPE: CHRONIC PAIN

## 2020-01-01 ASSESSMENT — PAIN DESCRIPTION - LOCATION
LOCATION: FOOT
LOCATION: MOUTH
LOCATION: CHEST
LOCATION: FOOT
LOCATION: FOOT
LOCATION: LEG
LOCATION: FOOT

## 2020-01-01 ASSESSMENT — PAIN DESCRIPTION - ONSET
ONSET: ON-GOING

## 2020-01-01 ASSESSMENT — PAIN DESCRIPTION - DESCRIPTORS
DESCRIPTORS: ACHING
DESCRIPTORS: ACHING;BURNING
DESCRIPTORS: ACHING;BURNING
DESCRIPTORS: ACHING
DESCRIPTORS: CONSTANT
DESCRIPTORS: DISCOMFORT

## 2020-01-01 ASSESSMENT — PAIN SCALES - WONG BAKER
WONGBAKER_NUMERICALRESPONSE: 0

## 2020-01-20 PROBLEM — J96.01 ACUTE HYPOXEMIC RESPIRATORY FAILURE (HCC): Status: ACTIVE | Noted: 2020-01-01

## 2020-01-21 PROBLEM — Z51.5 PALLIATIVE CARE PATIENT: Status: ACTIVE | Noted: 2020-01-01

## 2020-01-21 NOTE — PROGRESS NOTES
1/20/2020 10:10 PM - [place] Incoming Lab Results From [employer] Value Ref Range & Units Status Collected Lab   pH, Arterial 7.340Low   7.350 - 7.450 Final 01/20/2020 10:09 PM [facility]   pCO2, Arterial 50. 0High   35.0 - 45.0 mmHg Final 01/20/2020 10:09 PM [facility]   pO2, Arterial 43. 0Low Panic   80.0 - 100.0 mmHg Final 01/20/2020 10:09 PM [address] [place] [address] Lab   HCO3, Arterial 27. 0High   22.0 - 26.0 mmol/L Final 01/20/2020 10:09 PM [facility] Excess, Arterial 0.7  -2.0 - 2.0 mmol/L Final 01/20/2020 10:09 PM [address] [place] [address] Lab   Hemoglobin, Art, Extended 10.4Low   12.0 - 16.0 g/dL Final 01/20/2020 10:09 PM [address] [place] [address] Lab   O2 Sat, Arterial 80.2Low Panic   >92 % Final 01/20/2020 10:09 PM [facility]   Carboxyhgb, Arterial 2.1  0.0 - 5.0 % Final 01/20/2020 10:09 PM [facility]        0.0-1.5   (Smokers 1.5-5.0)    Methemoglobin, Arterial 0.9  <1.5 % Final 01/20/2020 10:09 PM [address] [place] [address] Lab   O2 Content, Arterial 11.7  Not Established mL/dL Final 01/20/2020 10:09 PM [address] [place] [address] Lab     Pt on room air  resp rate 18  right brachial puncture

## 2020-01-21 NOTE — PROGRESS NOTES
upon arrival, pleasant and agreeable to PT. Appears lethargic with VCs to open eyes. RN adama therapy session. Pain Screening  Patient Currently in Pain: No  Pain Assessment  Pain Assessment: 0-10  Pain Level: 0  Cam[place]-Baker Pain Rating: No hurt  Functional Pain Assessment: Activities are not prevented  Vital Signs  Pulse: 70  Heart Rate Source: Monitor  Patient Currently in Pain: No  Oxygen Therapy  SpO2: (!) 82 %(on 2 L/min supplemental O2 semi-supine in bed; nurse notified, O2 increased to 3 L/min and pt assisted to sitting; spO2 = 95 in sitting on 3 L/min)  Pulse Oximeter Device Location: Finger  O2 Device: Nasal cannula  O2 Flow Rate (L/min): 3 L/min       Orientation  Orientation  Overall Orientation Status: Impaired  Orientation Level: Disoriented to time;Disoriented to situation;Disoriented to place(pt able to state first name, unable to state last name)  Social/Functional History  Social/Functional History  Lives With: (pt is a poor historian; disoriented and lethargic, limiting ability to obtain PMH and PLOF information)  Cognition        Objective          AROM RLE (degrees)  RLE AROM: WFL  AROM LLE (degrees)  LLE AROM : WFL  Strength Other  Other: formal testing deferred due to pt cognitive status; pt able to move BRYNN LEs against gravity at hips, knees and ankles        Bed mobility  Rolling to Right: Stand by assistance  Supine to Sit: Contact guard assistance  Scooting: Contact guard assistance  Transfers  Sit to Stand: Minimal Assistance  Stand to sit: Minimal Assistance  Bed to Chair: Minimal assistance  Comment: commode transfer: min A stand pivot t/f  Ambulation  Ambulation?: Yes  More Ambulation?: No  Ambulation 1  Surface: level tile  Device: Hand-Held Assist  Assistance:  Moderate assistance  Quality of Gait: forward flexed, slow jennifer, mildly unsteady  Gait Deviations: Slow Jennifer  Distance: 5 feet     Balance  Sitting - Static: Fair;+  Sitting - Dynamic: Fair  Standing - Static: Fair;-  Standing - Dynamic: Poor;+  Exercises  Hip Flexion: x10 BRYNN LEs EOB  Knee Long Arc Quad: x10 BRYNN LEs EOB     Plan   Plan  Times per week: 5-7x/week  Times per day: Daily  Current Treatment Recommendations: Transfer Training, Patient/Caregiver Education & Training, Balance Training, Home Exercise Program, Gait Training, Functional Mobility Training, Safety Education & Training, Positioning  Safety Devices  Type of devices: Patient at risk for falls, Nurse notified, All fall risk precautions in place, Gait belt, Call light within reach, Left in chair, Chair alarm in place    G-Code       OutComes Score                                                  AM-PAC Score             Goals  Long term goals  Time Frame for Long term goals : 2 weeks  Long term goal 1: Pt will transition sit/stand with CGA. Long term goal 2: Pt will transfer to bed/chair/commode/toilet with CGA. Long term goal 3: Pt will ambulate > 25 ft with min A and least restrictive AD.   Patient Goals   Patient goals : none stated when asked       Therapy Time   Individual Concurrent Group Co-treatment   Time In           Time Out           Minutes                   Keri Hope PT    Electronically signed by Keri Hope PT on 1/21/2020 at 11:10 AM

## 2020-01-21 NOTE — PROGRESS NOTES
injection 5,000 Units  5,000 Units Subcutaneous 3 times per day Je Hsuton MD   5,000 Units at 01/21/20 0557    vancomycin (VANCOCIN) intermittent dosing (placeholder)   Other RX Placeholder Je Huston MD        [START ON 1/22/2020] levofloxacin (LEVAQUIN) 750 MG/150ML infusion 750 mg  750 mg Intravenous Q48H Je Huston MD        ceFEPIme (MAXIPIME) 2 g in sterile water 20 mL IV syringe  2 g Intravenous Q12H Lydia Gonzalez MD   2 g at 01/21/20 1002    acetylcysteine (MUCOMYST) 20 % solution 600 mg  600 mg Inhalation BID Je Huston MD   600 mg at 01/21/20 0708    ARIPiprazole (ABILIFY) tablet 20 mg  20 mg Oral Daily Je Huston MD        aspirin EC tablet 81 mg  81 mg Oral Daily Je Huston MD   81 mg at 01/21/20 0945    atorvastatin (LIPITOR) tablet 40 mg  40 mg Oral Nightly Je Huston MD   40 mg at 01/21/20 0207    carvedilol (COREG) tablet 3.125 mg  3.125 mg Oral BID WC Je Huston MD        clopidogrel (PLAVIX) tablet 75 mg  75 mg Oral Daily Je Huston MD   75 mg at 01/21/20 0945    FLUoxetine (PROZAC) capsule 60 mg  60 mg Oral Daily Je Huston MD        guaiFENesin [facility]) extended release tablet 1,200 mg  1,200 mg Oral BID Je Huston MD   1,200 mg at 01/21/20 0945    hydrOXYzine (ATARAX) tablet 25 mg  25 mg Oral Q8H PRN Je Huston MD        isosorbide mononitrate (IMDUR) extended release tablet 30 mg  30 mg Oral Daily Je Huston MD   30 mg at 01/21/20 0944    mirtazapine (REMERON) tablet 7.5 mg  7.5 mg Oral Nightly Je Huston MD   7.5 mg at 01/21/20 0211    montelukast (SINGULAIR) tablet 10 mg  10 mg Oral Nightly Je Huston MD   10 mg at 01/21/20 0211    pantoprazole (PROTONIX) tablet 40 mg  40 mg Oral QAM AC Je Huston MD   40 mg at 01/21/20 0557    predniSONE (DELTASONE) tablet 20 mg  20 mg Oral Daily Je Huston MD   20 mg at 01/21/20 0945    sennosides-docusate sodium (SENOKOT-S) 8.6-50 MG tablet 1 tablet  1 tablet Oral Daily Frida Miranda MD        hydrocortisone (ANUSOL-HC) 2.5 % rectal cream   Rectal BID PRN Frida Miranda MD        albuterol (PROVENTIL) nebulizer solution 2.5 mg  2.5 mg Nebulization Q1H PRN Frida Miranda MD        formoterol (PERFOROMIST) nebulizer solution 20 mcg  20 mcg Nebulization 2 times per day Frida Miranda MD   20 mcg at 01/21/20 0701    budesonide (PULMICORT) nebulizer suspension 500 mcg  0.5 mg Nebulization Q12H Frida Miranda MD   500 mcg at 01/21/20 0701    ipratropium (ATROVENT) 0.02 % nebulizer solution 0.5 mg  0.5 mg Nebulization 4x daily Frida Miranda MD   0.5 mg at 01/21/20 0708        Labs:     Recent Labs     01/20/20 2205   WBC 8.9   RBC 3.41*   HGB 10.1*   HCT 34.3*   .6*   MCH 29.6   MCHC 29.4*        Recent Labs     01/20/20 2205 01/20/20 2209   *  --    K 4.5 4.5   ANIONGAP 11  --      --    CO2 22  --    BUN 17  --    CREATININE 1.1*  --    GLUCOSE 92  --    CALCIUM 8.4*  --      No results for input(s): MG, PHOS in the last 72 hours. Recent Labs     01/20/20 2205   AST 10   ALT <5*   BILITOT <0.2   ALKPHOS 108*     ABGs:No results for input(s): PH, PO2, PCO2, HCO3, BE, O2SAT in the last 72 hours.   Troponin T:   Recent Labs     01/20/20 2205   TROPONINI 0.02     INR:   Recent Labs     01/20/20 2205   INR 1.26*     Lactic Acid:   Recent Labs     01/20/20 2208   LACTA 0.7       Objective:   Vitals: BP (!) 120/56   Pulse 63   Temp 97 °F (36.1 °C) (Temporal)   Resp 19   Ht 5' 1\" (1.549 m)   Wt 132 lb 9.6 oz (60.1 kg)   SpO2 93%   BMI 25.05 kg/m²   24HR INTAKE/OUTPUT:      Intake/Output Summary (Last 24 hours) at 1/21/2020 1052  Last data filed at 1/21/2020 1027  Gross per 24 hour   Intake 1890 ml   Output 300 ml   Net 1590 ml     General appearance: Alert  HEENT: NC/AT  Lungs: no increased work of breathing, poor inspiratory effort  Heart: regular rate and rhythm, S1, S2 normal, no murmur, click, rub or gallop  Abdomen: soft, non-tender; bowel sounds normal; no masses,  no organomegaly  Extremities: edema trace  Neurologic: AAOx2 (person/place), gross motor and sensory function intact  Skin: warm    Assessment and Plan: Active Problems:    Acute hypoxemic respiratory failure [facility])    Palliative care patient  Resolved Problems:    * No resolved hospital problems. *    Acute hypoxemic respiratory failure likely secondary to pneumonia:  O2 PRN. Continue empiric antibiotics for HAP. Bronchodilators. Respiratory cultures. CTA PE study done and ?reported as per H&P but I cannot see official reading, just images - f/u read. CAD status post recent stents: DAPT/statin. Currently denies chest pain. COPD: Not in acute exacerbation. O2 PRN. Bronchodilators. Known home O2 (2L) with ambulation as per previous discharge summary. Anxiety/depression: Continue with home medications. CKD stage III: Stable. Monitor BMP.     Advance Directive: Full Code    DVT prophylaxis: Heparin    Discharge planning: TBD      Signed:  Katelyn Tijerina MD 1/21/2020 10:52 AM  Rounding Hospitalist

## 2020-01-21 NOTE — ED NOTES
Aranza from respiratory at bedside obtaining ABGs and collecting rainbow for blood draw. Pt tolerated well.       Ricky Bond RN  01/20/20 1514

## 2020-01-21 NOTE — ED PROVIDER NOTES
1\" (1.549 m) 115 lb (52.2 kg)       Physical Exam  Constitutional:       General: She is not in acute distress. Appearance: She is well-developed and well-groomed. HENT:      Head: Normocephalic and atraumatic. Nose: Nose normal.      Mouth/Throat:      Mouth: Mucous membranes are moist.      Pharynx: Oropharynx is clear. Eyes:      Extraocular Movements: Extraocular movements intact. Pupils: Pupils are equal, round, and reactive to light. Cardiovascular:      Rate and Rhythm: Normal rate and regular rhythm. Heart sounds: No murmur. Pulmonary:      Effort: No tachypnea. Breath sounds: Decreased air movement present. Examination of the right-upper field reveals decreased breath sounds. Examination of the right-lower field reveals decreased breath sounds. Examination of the left-lower field reveals decreased breath sounds. Decreased breath sounds present. No wheezing, rhonchi or rales. Abdominal:      General: Abdomen is flat. There is no distension. Palpations: Abdomen is soft. Skin:     General: Skin is warm and dry. Capillary Refill: Capillary refill takes less than 2 seconds. Neurological:      Mental Status: She is lethargic. Comments: Patient is altered. She does not answer appropriately to questions. She appears to move all extremities appropriately. There is no facial droop. Psychiatric:      Comments: Unable to assess secondary to altered mental status. DIAGNOSTIC RESULTS     EKG: All EKG's areinterpreted by the Emergency Department Physician who either signs or Co-signs this chart in the absence of a cardiologist.    Sinus rhythm with a rate of 79, left axis deviation, left bundle branch block, no acute ST elevations or depressions.     RADIOLOGY:  Non-plain film images such as CT, Ultrasound and MRI are read by the radiologist. Plain radiographic images are visualized and preliminarily interpreted bythe emergency physician with the below

## 2020-01-21 NOTE — H&P
Abdominal pain     Arthritis     CAD (coronary artery disease)     COPD (chronic obstructive pulmonary disease) (HCC)     Diarrhea     GERD (gastroesophageal reflux disease)     Hypertension     Myocardial infarct [facility])      Past Psychiatric History:  Denied any when was admitted in December by me    Past Surgical History:   Procedure Laterality Date    CARDIAC CATHETERIZATION      CORONARY ANGIOPLASTY WITH STENT PLACEMENT      x2     Social History     Tobacco History     Smoking Status  Former Smoker Smoking Frequency  1 pack/day    Smokeless Tobacco Use  Never Used    Tobacco Comment  Pt quit 6 weeks ago per family report. Alcohol History     Alcohol Use Status  Never          Drug Use     Drug Use Status  Never          Sexual Activity     Sexually Active  Not Currently Comment  She states that she has no children.             CODE STATUS: DNR-CCA  Health Care Proxy: Mrs. Mark Callaway, cousin, [id number]    Family History:  Denies any     Allergies   Allergen Reactions    Lisinopril     Penicillins      Current Facility-Administered Medications   Medication Dose Route Frequency Provider Last Rate Last Dose    vancomycin (VANCOCIN) 1 g in dextrose 5% 250 mL IVPB  1,000 mg Intravenous Once Janna Yin MD   1,000 mg at 01/20/20 2326    levofloxacin (LEVAQUIN) 750 MG/150ML infusion 750 mg  750 mg Intravenous Once Janna Yin  mL/hr at 01/20/20 2241 750 mg at 01/20/20 2241    ceFEPIme (MAXIPIME) 2 g in sterile water 20 mL IV syringe  2 g Intravenous Q12H Janna Yin MD   2 g at 01/20/20 2241    lactated ringers bolus  1,000 mL Intravenous Once Janna Yin MD 1,000 mL/hr at 01/20/20 2325 1,000 mL at 01/20/20 2325    acetylcysteine (MUCOMYST) 20 % solution 600 mg  600 mg Inhalation BID Gama Mijares MD        [START ON 1/21/2020] ARIPiprazole (ABILIFY) tablet 20 mg  20 mg Oral Daily Gama Mijares MD        [START ON 1/21/2020] aspirin tablet 81 mg  81 mg Oral Daily Felisa Chakraborty MD        atorvastatin (LIPITOR) tablet 40 mg  40 mg Oral Nightly Felisa Chakraborty MD        [START ON 1/21/2020] carvedilol (COREG) tablet 3.125 mg  3.125 mg Oral BID WC Felisa Chakraborty MD        [START ON 1/21/2020] clopidogrel (PLAVIX) tablet 75 mg  75 mg Oral Daily Felisa Chakraborty MD        [START ON 1/21/2020] FLUoxetine (PROZAC) capsule 60 mg  60 mg Oral Daily Felisa Chakraborty MD        guaiFENesin [facility]) extended release tablet 1,200 mg  1,200 mg Oral BID Felisa Chakraborty MD        hydrOXYzine (ATARAX) tablet 25 mg  25 mg Oral Q8H PRN Felisa Chakraborty MD        [START ON 1/21/2020] isosorbide mononitrate (IMDUR) extended release tablet 30 mg  30 mg Oral Daily Felisa Chakraborty MD        mirtazapine (REMERON) tablet 7.5 mg  7.5 mg Oral Nightly Felisa Chakraborty MD        montelukast (SINGULAIR) tablet 10 mg  10 mg Oral Nightly Felisa Chakraborty MD        [START ON 1/21/2020] pantoprazole (PROTONIX) tablet 40 mg  40 mg Oral QAM AC MD Lazarus Kendall ON 1/21/2020] predniSONE (DELTASONE) tablet 20 mg  20 mg Oral Daily MD Lazarus Kendall ON 1/21/2020] senna-docusate (Ellouise Hu) 8.6-50 MG per tablet 1 tablet  1 tablet Oral Daily Felisa Chakraborty MD        hydrocortisone (ANUSOL-HC) 2.5 % rectal cream   Rectal BID PRN Felisa Chakraborty MD        albuterol (PROVENTIL) nebulizer solution 2.5 mg  2.5 mg Nebulization Q1H PRN Felisa Chakraborty MD        formoterol (PERFOROMIST) nebulizer solution 20 mcg  20 mcg Nebulization 2 times per day Felisa Chakraborty MD        budesonide (PULMICORT) nebulizer suspension 500 mcg  0.5 mg Nebulization Q12H Felisa Chakraborty MD        ipratropium (ATROVENT) 0.02 % nebulizer solution 0.5 mg  0.5 mg Nebulization 4x daily Felisa Chakraborty MD        [START ON 1/21/2020] levofloxacin (LEVAQUIN) 750 MG/150ML infusion 750 mg  750 mg Intravenous Q24H Felisa Chakraborty MD         Current Outpatient Medications   Medication Sig Dispense Refill    Negative   pH, UA Latest Ref Range: 5.0 - 8.0     5.5   Protein, UA Latest Ref Range: Negative mg/dL    Negative   Urobilinogen, Urine Latest Ref Range: <2.0 E.U./dL    0.2   Nitrite, Urine Latest Ref Range: Negative     Negative   Leukocyte Esterase, Urine Latest Ref Range: Negative     Negative   URINE RT REFLEX TO CULTURE Unknown    Rpt   O2 Therapy Unknown   Unknown    Hemoglobin, Art, Extended Latest Ref Range: 12.0 - 16.0 g/dL   10.4 (L)    pH, Arterial Latest Ref Range: 7.350 - 7.450    7.340 (L)    pCO2, Arterial Latest Ref Range: 35.0 - 45.0 mmHg   50.0 (H)    pO2, Arterial Latest Ref Range: 80.0 - 100.0 mmHg   43.0 (LL)    HCO3, Arterial Latest Ref Range: 22.0 - 26.0 mmol/L   27.0 (H)    Base Excess, Arterial Latest Ref Range: -2.0 - 2.0 mmol/L   0.7    O2 Sat, Arterial Latest Ref Range: >92 %   80.2 (LL)    O2 Content, Arterial Latest Ref Range: Not Established mL/dL   11.7    Methemoglobin, Arterial Latest Ref Range: <1.5 %   0.9    Carboxyhgb, Arterial Latest Ref Range: 0.0 - 5.0 %   2.1      DIAGNOSTICS:  Pending official reads of: CTA Chest and CXR 1-View and CT Head        ASESSMENTS & PLANS:    AMS most likley due to Acute Hypoxemic Respiratory Failure from RUL HCAP:  Cefepime 2g IV Q12h, timed off ED Dose  Vancomycin, with pharmacy dosing by level  Levaquin 750mg IV x once in ED  Neuro checks  follow for official read of CT  Consider neuro consult in AM    Supportive and prophylactic Txx:  DVT PPx: Heparin SQ  GI (PUD) PPx: as per COPD  PT: as per ag >65 years  Hydroxysone PRN      Chronic Constipation:   Continue Docusen 1 tab PO QDay    CAD, Hx NSTEMI:  continue Lipitor 40mg PO QHS  Continue with ASA as EC 81mg PO QDay  Continue Coreg 3.125mg PO BID  NG SL tabs PRN as per CP protocol  Continue Imdur 30mg PO QDay  continue Plavix 75mg PO Qday    COPD which is NOT in exacerbation:  Protonix 40mg PO QAC, as is on steroids  Continue Prednisone 20mg PO QDay  Continue Montelukast 10mg PO QHS  Sub

## 2020-01-21 NOTE — PROGRESS NOTES
Pharmacy Renal Adjustment    Eden Murphy is a 81 y.o. female. Pharmacy has renally adjusted medications per protocol. Recent Labs     01/20/20  2205   BUN 17       Recent Labs     01/20/20  2205   CREATININE 1.1*       Estimated Creatinine Clearance: 32 mL/min (A) (based on SCr of 1.1 mg/dL (H)). Height:   Ht Readings from Last 1 Encounters:   01/21/20 5' 1\" (1.549 m)     Weight:  Wt Readings from Last 1 Encounters:   01/21/20 132 lb 9.6 oz (60.1 kg)     Plan: Adjust the following medications based on renal function:           Levaquin 750 mg IV every 24 hours to every 48 hours.     Electronically signed by Radha Self, [address] [place] [address] on 1/21/2020 at 5:24 AM

## 2020-01-22 NOTE — PROGRESS NOTES
Physical Therapy   Name: Seth Webster  MRN:  [id number]  Date of service:  1/22/2020 01/22/20 1016   Restrictions/Precautions   Restrictions/Precautions Fall Risk   General   Chart Reviewed Yes   Subjective   Subjective Pt. states she wants to go to the bathroom and then stay in bed. Pain Screening   Patient Currently in Pain No   Pain Assessment   Non-Pharmaceutical Pain Intervention(s) Repositioned; Ambulation/Increased Activity   Oxygen Therapy   O2 Device Nasal cannula   O2 Flow Rate (L/min)   (O2 removed for t/f to [facility] and then amb. around bed )   Transfers   Sit to Stand Minimal Assistance   Stand to sit Minimal Assistance   Bed to Chair Minimal assistance   Comment bed to [facility] and then sat back on EOB. Pt. needed A with toilet hygeine. Stood with RW Min A x 1 to be cleaned up. Ambulation   Ambulation? Yes   Ambulation 1   Surface level tile   Device Rolling Walker   Assistance Minimal assistance   Quality of Gait forward flexed, slow jennifer, mildly unsteady   Gait Deviations Slow Jennifer   Distance 20'   Balance   Sitting - Static Fair;+   Sitting - Dynamic Fair   Standing - Static Fair;-   Standing - Dynamic Poor;+   Comments during standing, pt. letting go of RW, slight loss of balance, requiring Mod A to regain midline. Exercises   Comments AAROM HS, hip add/abd, AP x 10 reps in sitting  (pt. beginning to resist motion)   Patient Goals    Patient goals  get better   Long term goals   Time Frame for Long term goals  2 wks   Long term goal 1 Pt will transition sit/stand with CGA. Long term goal 2 Pt will transfer to bed/chair/commode/toilet with CGA. Long term goal 3 Pt will ambulate > 25 ft with min A and least restrictive AD. Conditions Requiring Skilled Therapeutic Intervention   Body structures, Functions, Activity limitations Decreased functional mobility ; Decreased safe awareness;Decreased balance;Decreased cognition   Assessment Pt. tolerated increase in amb. well, and was able to progress today. Pt. may be inconsistent with participation due to stating she wanted to go back to bed. Pt. encouraged to sit up for at least 1 hr. Treatment Diagnosis Impaired mobility   Prognosis Fair   Decision Making Low Complexity   Barriers to Learning cognitive   REQUIRES PT FOLLOW UP Yes   Treatment Initiated  yes   Discharge Recommendations Continue to assess pending progress   Activity Tolerance   Activity Tolerance Patient Tolerated treatment well;Patient limited by cognitive status   Plan   Times per week 5-7x/week   Times per day Daily   Plan weeks 2 wks   Current Treatment Recommendations Transfer Training;Patient/Caregiver Education & Training;Balance Training;Home Exercise Program;Gait Training;Functional Mobility Training; Safety Education & Training;Positioning   Plan Comment cont. PT per POC. Safety Devices   Type of devices Patient at risk for falls;Nurse notified; All fall risk precautions in place;Gait belt;Call light within reach; Left in chair;Chair alarm in place   PT Whiteboard Notes   Therapy Whiteboard reeval- 2/4/20; acute hypoxemic resp failure, pleasant/disoriented     Electronically signed by Albaro Miller PT on 1/22/2020 at 10:26 AM

## 2020-01-22 NOTE — PLAN OF CARE
Problem: Falls - Risk of:  Goal: Will remain free from falls  Description  Will remain free from falls  1/21/2020 2326 by Marta Weaver RN  Outcome: Ongoing  1/21/2020 0955 by Gaby Navarro RN  Outcome: Ongoing  Goal: Absence of physical injury  Description  Absence of physical injury  1/21/2020 2326 by Marta Weaver RN  Outcome: Ongoing  1/21/2020 0955 by Gaby Navarro RN  Outcome: Ongoing     Problem: Breathing Pattern - Ineffective:  Goal: Ability to achieve and maintain a regular respiratory rate will improve  Description  Ability to achieve and maintain a regular respiratory rate will improve  1/21/2020 2326 by Marta Weaver RN  Outcome: Ongoing  1/21/2020 0955 by Gaby Navarro RN  Outcome: Ongoing     Problem: Discharge Planning:  Goal: Discharged to appropriate level of care  Description  Discharged to appropriate level of care  1/21/2020 2326 by Marta Weaver RN  Outcome: Ongoing  1/21/2020 0955 by Gaby Navraro RN  Outcome: Ongoing  Goal: Participates in care planning  Description  Participates in care planning  1/21/2020 2326 by Marta Weaver RN  Outcome: Ongoing  1/21/2020 0955 by Gaby Navarro RN  Outcome: Ongoing     Problem: Discharge Planning:  Goal: Participates in care planning  Description  Participates in care planning  1/21/2020 2326 by Marta Weaver RN  Outcome: Ongoing  1/21/2020 0955 by Gaby Navarro RN  Outcome: Ongoing     Problem: Airway Clearance - Ineffective:  Goal: Clear lung sounds  Description  Clear lung sounds  1/21/2020 2326 by Marta Weaver RN  Outcome: Ongoing  1/21/2020 0955 by Gaby Navarro RN  Outcome: Ongoing  Goal: Ability to maintain a clear airway will improve  Description  Ability to maintain a clear airway will improve  1/21/2020 2326 by Marta Weaver RN  Outcome: Ongoing  1/21/2020 0955 by Gaby Navarro RN  Outcome: Ongoing     Problem: Fluid Volume - Deficit:  Goal: Achieves intake and output within specified

## 2020-01-22 NOTE — PROGRESS NOTES
Pharmacy Vancomycin Consult     Vancomycin Day: 2  Current Dosing: pulse dosing     Temp max:  97.8    Recent Labs     01/20/20  2205   BUN 17       Recent Labs     01/20/20 2205   CREATININE 1.1*       Recent Labs     01/20/20 2205   WBC 8.9         Intake/Output Summary (Last 24 hours) at 1/22/2020 0144  Last data filed at 1/22/2020 0008  Gross per 24 hour   Intake 930 ml   Output 1400 ml   Net -470 ml       Culture Date Source Results   none                   Ht Readings from Last 1 Encounters:   01/21/20 5' 1\" (1.549 m)        Wt Readings from Last 1 Encounters:   01/21/20 132 lb 9.6 oz (60.1 kg)         Body mass index is 25.05 kg/m². Estimated Creatinine Clearance: 32 mL/min (A) (based on SCr of 1.1 mg/dL (H)). Random: 7.2    Assessment/Plan: Continue Vancomycin pulse dosing. Give Vancomycin 1000 mg IV x 1 dose today. Level ordered for 01/23 at 0130.     Electronically signed by Roger Rothman, [address] [place] [address] on 1/22/2020 at 1:44 AM

## 2020-01-22 NOTE — PROGRESS NOTES
Hospitalist Progress Note  1/22/2020 10:26 AM  Subjective:   Admit Date: 1/20/2020  PCP: Ron Beck DO    Chief Complaint: Dyspnea/hypoxemia    Subjective: Patient seen and examined sitting up in bedside chair, just has completed work with physical therapy. Patient just feels fatigued in nature. Denies any headache, chest pain, abdominal pain, nausea vomiting, fevers or chills. Patient herself states that she desires to return home, open to home health services. [facility] History: (Per hospitalist service ) 50-year-old female known to me from previous admissions recently here for NSTEMI with stents placed. Patient has history of COPD requiring 2 L of oxygen with ambulation at home. Patient presenting for acute dyspnea brought to the emergency department found to have right upper lobe pneumonia currently being treated for healthcare acquired pneumonia. -CTA ruled out pulmonary embolism, does reveal right upper lobe pneumonic consolidation with adjacent loculated fluid. While a small bilateral pleural effusions left greater than the right. ROS: 14 point review of systems is negative except as specifically addressed above.     DIET CARDIAC; Low Sodium (2 GM)    Intake/Output Summary (Last 24 hours) at 1/22/2020 1026  Last data filed at 1/22/2020 1019  Gross per 24 hour   Intake 850 ml   Output 2650 ml   Net -1800 ml     Medications:  Current Facility-Administered Medications   Medication Dose Route Frequency Provider Last Rate Last Dose    sodium chloride flush 0.9 % injection 10 mL  10 mL Intravenous 2 times per day Tereso Sexton MD   10 mL at 01/22/20 0759    sodium chloride flush 0.9 % injection 10 mL  10 mL Intravenous PRN Tereso Sexton MD        ondansetron [facility]) injection 4 mg  4 mg Intravenous Q6H PRN Tereso Sexton MD        acetaminophen (TYLENOL) tablet 650 mg  650 mg Oral Q4H PRN Tereso Sexton MD   650 mg at 01/22/20 0307    polyethylene glycol (GLYCOLAX) packet 17 g  17 g Oral Daily PRN Dian Hay MD        melatonin tablet 3 mg  3 mg Oral Nightly PRN Dian Hay MD        heparin (porcine) injection 5,000 Units  5,000 Units Subcutaneous 3 times per day Dian Hay MD   5,000 Units at 01/22/20 0547    vancomycin (VANCOCIN) intermittent dosing (placeholder)   Other RX Placeholder Dian Hay MD        levofloxacin (LEVAQUIN) 750 MG/150ML infusion 750 mg  750 mg Intravenous Q48H Dian Hay MD        ceFEPIme (MAXIPIME) 2 g in sterile water 20 mL IV syringe  2 g Intravenous Q12H Aleah Romero MD   2 g at 01/21/20 2117    acetylcysteine (MUCOMYST) 20 % solution 600 mg  600 mg Inhalation BID Dian Hay MD   600 mg at 01/22/20 0701    ARIPiprazole (ABILIFY) tablet 20 mg  20 mg Oral Daily Dian Hay MD   20 mg at 01/22/20 0759    aspirin EC tablet 81 mg  81 mg Oral Daily Dian Hay MD   81 mg at 01/22/20 0759    atorvastatin (LIPITOR) tablet 40 mg  40 mg Oral Nightly Dian Hay MD   40 mg at 01/21/20 2114    carvedilol (COREG) tablet 3.125 mg  3.125 mg Oral BID WC Dian Hay MD   3.125 mg at 01/22/20 0759    clopidogrel (PLAVIX) tablet 75 mg  75 mg Oral Daily Dian Hay MD   75 mg at 01/22/20 0802    FLUoxetine (PROZAC) capsule 60 mg  60 mg Oral Daily Dian Hay MD   60 mg at 01/22/20 0759    guaiFENesin [facility]) extended release tablet 1,200 mg  1,200 mg Oral BID Dian Hay MD   1,200 mg at 01/22/20 0802    hydrOXYzine (ATARAX) tablet 25 mg  25 mg Oral Q8H PRN Dian Hay MD        isosorbide mononitrate (IMDUR) extended release tablet 30 mg  30 mg Oral Daily Dian Hay MD   30 mg at 01/22/20 0759    mirtazapine (REMERON) tablet 7.5 mg  7.5 mg Oral Nightly Dian Hay MD   7.5 mg at 01/21/20 2116    montelukast (SINGULAIR) tablet 10 mg  10 mg Oral Nightly Dian Hay MD   10 mg at 01/21/20 2116    pantoprazole (PROTONIX) tablet 40 mg  40 mg Oral QAM AC Dian Hay MD   40 mg at 01/22/20 0513    predniSONE (DELTASONE) tablet 20 mg  20 mg Oral Daily Tereso Sexton MD   20 mg at 01/22/20 0802    sennosides-docusate sodium (SENOKOT-S) 8.6-50 MG tablet 1 tablet  1 tablet Oral Daily Tereso Sexton MD   1 tablet at 01/22/20 0802    hydrocortisone (ANUSOL-HC) 2.5 % rectal cream   Rectal BID PRN Tereso Sexton MD        albuterol (PROVENTIL) nebulizer solution 2.5 mg  2.5 mg Nebulization Q1H PRN Tereso Sexton MD        formoterol (PERFOROMIST) nebulizer solution 20 mcg  20 mcg Nebulization 2 times per day Tereso Sexton MD   20 mcg at 01/22/20 0704    budesonide (PULMICORT) nebulizer suspension 500 mcg  0.5 mg Nebulization Q12H Tereso Sexton MD   500 mcg at 01/22/20 7248    ipratropium (ATROVENT) 0.02 % nebulizer solution 0.5 mg  0.5 mg Nebulization 4x daily Tereso Sexton MD   0.5 mg at 01/22/20 0701        Labs:     Recent Labs     01/20/20 2205 01/22/20  0602   WBC 8.9 10.1   RBC 3.41* 3.27*   HGB 10.1* 9.6*   HCT 34.3* 32.4*   .6* 99.1*   MCH 29.6 29.4   MCHC 29.4* 29.6*    300     Recent Labs     01/20/20 2205 01/20/20 2209 01/22/20  0602   *  --  139   K 4.5 4.5 4.4   ANIONGAP 11  --  11     --  102   CO2 22  --  26   BUN 17  --  16   CREATININE 1.1*  --  0.9   GLUCOSE 92  --  123*   CALCIUM 8.4*  --  8.6*     No results for input(s): MG, PHOS in the last 72 hours. Recent Labs     01/20/20 2205   AST 10   ALT <5*   BILITOT <0.2   ALKPHOS 108*     ABGs:No results for input(s): PH, PO2, PCO2, HCO3, BE, O2SAT in the last 72 hours.   Troponin T:   Recent Labs     01/20/20 2205   TROPONINI 0.02     INR:   Recent Labs     01/20/20 2205   INR 1.26*     Lactic Acid:   Recent Labs     01/20/20 2208   LACTA 0.7       Objective:   Vitals: /65   Pulse 62   Temp 97.2 °F (36.2 °C) (Temporal)   Resp 16   Ht 5' 1\" (1.549 m)   Wt 130 lb 3.2 oz (59.1 kg)   SpO2 91%   BMI 24.60 kg/m²   24HR INTAKE/OUTPUT:      Intake/Output Summary (Last 24 hours) at 1/22/2020 1026  Last

## 2020-01-23 NOTE — PROGRESS NOTES
Physical Therapy  Omar Seek  [id number]     01/23/20 1034   Subjective   Subjective Agrees to work with therapy. Bed Mobility   Supine to Sit Contact guard assistance   Transfers   Sit to Stand Contact guard assistance   Stand to sit Contact guard assistance   Ambulation   Ambulation? Yes   Ambulation 1   Surface level tile   Device Rolling Walker   Assistance Minimal assistance;Contact guard assistance   Distance 25'   Exercises   Hip Flexion 10   Hip Abduction 10   Knee Long Arc Quad 10   Ankle Pumps 10   Comments B LE ex's in sitting    Other Activities   Comment Assisted patient to/from [facility]. Patient required assist with brief and clean up. Patient ambulated in [place] and agreed to sit up in recliner. Patient able to perform ex's in sitting but easily distracted. Patient positioned for comfort with all needs in reach. Long term goals   Time Frame for Long term goals  2 wks   Long term goal 1 Pt will transition sit/stand with CGA. Long term goal 2 Pt will transfer to bed/chair/commode/toilet with CGA. Long term goal 3 Pt will ambulate > 25 ft with min A and least restrictive AD. Activity Tolerance   Activity Tolerance Patient Tolerated treatment well;Patient limited by cognitive status   Safety Devices   Type of devices All fall risk precautions in place;Call light within reach; Chair alarm in place; Left in chair   Electronically signed by Aissatou Streeter PTA on 1/23/2020 at 10:40 AM

## 2020-01-23 NOTE — PROGRESS NOTES
Occupational Therapy  Facility/Department: [facility] 4 ONCOLOGY UNIT  Daily Treatment Note  NAME: Charles Kirby  : 1935  MRN: [id number]    Date of Service: 2020    Discharge Recommendations:          Assessment   Performance deficits / Impairments: Decreased functional mobility ; Decreased cognition;Decreased safe awareness;Decreased strength;Decreased endurance;Decreased ADL status; Decreased balance  Assessment: Pt. tolerated tx well, pt stand pivot t/f from EOB to [facility] with min assist with rolling walker therapist helped with toilet hygiene. Pt. walked out to hallway and then back to recliner. Pt. was easily distracted  Treatment Diagnosis: Hypoxemic respiratory failure, Pneumonia  REQUIRES OT FOLLOW UP: Yes  Activity Tolerance  Activity Tolerance: Patient Tolerated treatment well         Patient Diagnosis(es): The primary encounter diagnosis was Hypoxia. Diagnoses of Pneumonia of right upper lobe due to infectious organism [facility]) and Disorientation were also pertinent to this visit. has a past medical history of Abdominal pain, Arthritis, CAD (coronary artery disease), COPD (chronic obstructive pulmonary disease) ([facility] [address] Diarrhea, GERD (gastroesophageal reflux disease), Hypertension, Myocardial infarct [facility]), and Palliative care patient. has a past surgical history that includes Cardiac catheterization and Coronary angioplasty with stent. Restrictions  Restrictions/Precautions  Restrictions/Precautions: Fall Risk  Subjective   General  Chart Reviewed: Yes  Patient assessed for rehabilitation services?: Yes  Family / Caregiver Present: No  Diagnosis: Hypoxemic respiratory failure  Vital Signs  Patient Currently in Pain: Denies   Orientation     Objective    ADL  LE Dressing:  Moderate assistance  Toileting: Maximum assistance        Toilet Transfers  Toilet - Technique: Stand pivot  Equipment Used: Standard bedside commode  Toilet Transfer: Minimal assistance  Bed mobility  Supine to Sit: Contact guard assistance  Scooting: Contact guard assistance  Transfers  Stand Step Transfers: Minimal assistance  Stand Pivot Transfers: Minimal assistance  Sit to stand: Contact guard assistance                                                                 Plan   Plan  Times per week: 3-5x/week  G-Code     OutComes Score                                                  AM-PAC Score             Goals  Short term goals  Time Frame for Short term goals: 1 week  Short term goal 1: CGA with BSC transfers with r.w.   Short term goal 2: Tolerate standing 60 seconds CGA to assist with ADLs  Short term goal 3: Manage brief up and down for toileting with [place]   Long term goals  Long term goal 1: Return to PLOF       Therapy Time   Individual Concurrent Group Co-treatment   Time In           Time Out           Minutes                   Winston Porter [employer] student

## 2020-01-23 NOTE — PROGRESS NOTES
Hospitalist Progress Note  1/23/2020 10:39 AM  Subjective:   Admit Date: 1/20/2020  PCP: Jon Hunt DO    Chief Complaint: Dyspnea/hypoxemia    Subjective: Patient seen and examined this a.m., resting comfortably in bed. Nasal cannula oxygen in place. Patient clinically improving. Denies any headache, chest pain, abdominal pain, nausea vomiting, fevers or chills. Patient to complete home O2 evaluation today. [facility] History: (Per hospitalist service ) 81-year-old female known to me from previous admissions recently here for NSTEMI with stents placed. Patient has history of COPD requiring 2 L of oxygen with ambulation at home. Patient presenting for acute dyspnea brought to the emergency department found to have right upper lobe pneumonia currently being treated for healthcare acquired pneumonia. -CTA ruled out pulmonary embolism, does reveal right upper lobe pneumonic consolidation with adjacent loculated fluid. While a small bilateral pleural effusions left greater than the right. Patient continues to clinically improve, qualifies for home oxygen, DME order placed anticipating discharge 1/20/2020. ROS: 14 point review of systems is negative except as specifically addressed above.     DIET CARDIAC; Low Sodium (2 GM)    Intake/Output Summary (Last 24 hours) at 1/23/2020 1039  Last data filed at 1/23/2020 0003  Gross per 24 hour   Intake 230 ml   Output 2175 ml   Net -1945 ml     Medications:  Current Facility-Administered Medications   Medication Dose Route Frequency Provider Last Rate Last Dose    sodium chloride flush 0.9 % injection 10 mL  10 mL Intravenous 2 times per day Minus MD Briseyda   10 mL at 01/23/20 0935    sodium chloride flush 0.9 % injection 10 mL  10 mL Intravenous PRN Minus MD Briseyda   10 mL at 01/22/20 1116    ondansetron (ZOFRAN) injection 4 mg  4 mg Intravenous Q6H PRN Minus MD Briseyda        acetaminophen (TYLENOL) tablet 650 mg  650 mg Oral Q4H PRN [place] Yoshi Mi MD   650 mg at 01/23/20 0437    polyethylene glycol (GLYCOLAX) packet 17 g  17 g Oral Daily PRN Franki Laws MD        melatonin tablet 3 mg  3 mg Oral Nightly PRN Franki Laws MD        heparin (porcine) injection 5,000 Units  5,000 Units Subcutaneous 3 times per day Franki Laws MD   5,000 Units at 01/23/20 0438    levofloxacin (LEVAQUIN) 750 MG/150ML infusion 750 mg  750 mg Intravenous Q48H Franki Laws MD   Stopped at 01/22/20 2340    ceFEPIme (MAXIPIME) 2 g in sterile water 20 mL IV syringe  2 g Intravenous Q12H Jaye Deleon MD   2 g at 01/23/20 0934    acetylcysteine (MUCOMYST) 20 % solution 600 mg  600 mg Inhalation BID Franki Laws MD   600 mg at 01/23/20 4653    ARIPiprazole (ABILIFY) tablet 20 mg  20 mg Oral Daily Franki Laws MD   20 mg at 01/23/20 0936    aspirin EC tablet 81 mg  81 mg Oral Daily Franki Laws MD   81 mg at 01/23/20 0934    atorvastatin (LIPITOR) tablet 40 mg  40 mg Oral Nightly Franki Laws MD   40 mg at 01/22/20 2141    carvedilol (COREG) tablet 3.125 mg  3.125 mg Oral BID WC Franki Laws MD   3.125 mg at 01/23/20 0933    clopidogrel (PLAVIX) tablet 75 mg  75 mg Oral Daily Franki Laws MD   75 mg at 01/23/20 0934    FLUoxetine (PROZAC) capsule 60 mg  60 mg Oral Daily Franki Laws MD   60 mg at 01/23/20 6682    guaiFENesin (MUCINEX) extended release tablet 1,200 mg  1,200 mg Oral BID Franki Laws MD   1,200 mg at 01/23/20 5458    hydrOXYzine (ATARAX) tablet 25 mg  25 mg Oral Q8H PRN Franki Laws MD        isosorbide mononitrate (IMDUR) extended release tablet 30 mg  30 mg Oral Daily Franki Laws MD   30 mg at 01/23/20 0933    mirtazapine (REMERON) tablet 7.5 mg  7.5 mg Oral Nightly Franki Laws MD   7.5 mg at 01/22/20 2141    montelukast (SINGULAIR) tablet 10 mg  10 mg Oral Nightly Franki Laws MD   10 mg at 01/22/20 2141    pantoprazole (PROTONIX) tablet 40 mg  40 mg Oral [facility] Franki Laws MD   40 mg at 01/23/20 0123    predniSONE (DELTASONE) tablet 20 mg  20 mg Oral Daily Dian Hay MD   20 mg at 01/23/20 8873    sennosides-docusate sodium (SENOKOT-S) 8.6-50 MG tablet 1 tablet  1 tablet Oral Daily Dian Hay MD   1 tablet at 01/23/20 0934    hydrocortisone (ANUSOL-HC) 2.5 % rectal cream   Rectal BID PRN Dian Hay MD        albuterol (PROVENTIL) nebulizer solution 2.5 mg  2.5 mg Nebulization Q1H PRN Dian Hay MD        formoterol (PERFOROMIST) nebulizer solution 20 mcg  20 mcg Nebulization 2 times per day Dian Hay MD   20 mcg at 01/23/20 0700    budesonide (PULMICORT) nebulizer suspension 500 mcg  0.5 mg Nebulization Q12H Dian Hay MD   500 mcg at 01/23/20 0700    ipratropium (ATROVENT) 0.02 % nebulizer solution 0.5 mg  0.5 mg Nebulization 4x daily Dian Hay MD   0.5 mg at 01/23/20 0657        Labs:     Recent Labs     01/20/20 2205 01/22/20  0602 01/23/20  0147   WBC 8.9 10.1 13.1*   RBC 3.41* 3.27* 3.76*   HGB 10.1* 9.6* 10.8*   HCT 34.3* 32.4* 36.3*   .6* 99.1* 96.5   MCH 29.6 29.4 28.7   MCHC 29.4* 29.6* 29.8*    300 364     Recent Labs     01/20/20 2205 01/20/20 2209 01/22/20  0602 01/23/20  0147   *  --  139 143   K 4.5 4.5 4.4 4.7   ANIONGAP 11  --  11 12     --  102 101   CO2 22  --  26 30*   BUN 17  --  16 19   CREATININE 1.1*  --  0.9 0.9   GLUCOSE 92  --  123* 96   CALCIUM 8.4*  --  8.6* 9.5     No results for input(s): MG, PHOS in the last 72 hours. Recent Labs     01/20/20 2205   AST 10   ALT <5*   BILITOT <0.2   ALKPHOS 108*     ABGs:No results for input(s): PH, PO2, PCO2, HCO3, BE, O2SAT in the last 72 hours.   Troponin T:   Recent Labs     01/20/20 2205   TROPONINI 0.02     INR:   Recent Labs     01/20/20 2205   INR 1.26*     Lactic Acid:   Recent Labs     01/20/20 2208   LACTA 0.7       Objective:   Vitals: BP (!) 163/80   Pulse 83   Temp 97.7 °F (36.5 °C) (Temporal)   Resp 20   Ht 5' 1\" (1.549 m)   Wt 127 lb 3 oz (57.7 kg)   SpO2 96% BMI 24.03 kg/m²   24HR INTAKE/OUTPUT:      Intake/Output Summary (Last 24 hours) at 1/23/2020 1039  Last data filed at 1/23/2020 0933  Gross per 24 hour   Intake 230 ml   Output 2175 ml   Net -1945 ml     General appearance: Alert, no acute distress, tone noticeably improved today. HEENT: NC/AT  Lungs: no increased work of breathing, diminished breath sounds. Right worse than left. Heart: regular rate and rhythm, S1, S2 normal, no murmur, click, rub or gallop  Abdomen: soft, non-tender; bowel sounds normal; no masses,  no organomegaly  Extremities: Minimal bilateral lower extremity edema nonpitting. Neurologic: AAOx2 (person/place), gross motor and sensory function intact  Skin: warm, no obvious signs of bruising or skin deterioration    Assessment and Plan: Active Problems:    Acute hypoxemic respiratory failure [facility])    Palliative care patient  Resolved Problems:    * No resolved hospital problems. *    Acute hypoxemic respiratory failure likely secondary to pneumonia: Slowly improving, patient does qualify for home O2, DME order placed. We will continue with antibiotic therapy, O2 PRN. Continue empiric antibiotics for HAP. Bronchodilators. Correlated by CTA PE findings. respiratory cultures have been ordered, continue to encourage ambulation, out of bed activity, incentive spirometry. Home health orders have been placed    CAD status post recent stents: DAPT/statin. Pt denies chest pain currently. COPD: Not in acute exacerbation. O2 PRN. Bronchodilators. Known home O2 (2L) with ambulation as per previous discharge summary. Anxiety/depression: Continue with home medications. CKD stage III: Stable. Monitor BMP.     Advance Directive: Full Code    DVT prophylaxis: Heparin    Discharge planning: TBD      Electronically signed by   Graham Butt   Internal Medicine Hospitalist  On 1/23/2020  At 10:39 AM    EMR Dragon/Transcription disclaimer:   Much of this encounter note is an electronic transcription/translation of spoken language to printed text.  The electronic translation of spoken language may permit erroneous, or at times, nonsensical words or phrases to be inadvertently transcribed; although attempts have made to review the note for such errors, some may still exist.

## 2020-01-23 NOTE — PROGRESS NOTES
Pharmacy Vancomycin Consult     Vancomycin Day: 3  Current Dosing: pulse dosing    Temp max:  98.5    Recent Labs     01/20/20  2205 01/22/20  0602   BUN 17 16       Recent Labs     01/20/20  2205 01/22/20  0602   CREATININE 1.1* 0.9       Recent Labs     01/22/20  0602 01/23/20  0147   WBC 10.1 13.1*         Intake/Output Summary (Last 24 hours) at 1/23/2020 0354  Last data filed at 1/22/2020 2220  Gross per 24 hour   Intake 430 ml   Output 1550 ml   Net -1120 ml       Culture Date Source Results   none                   Ht Readings from Last 1 Encounters:   01/21/20 5' 1\" (1.549 m)        Wt Readings from Last 1 Encounters:   01/22/20 130 lb 3.2 oz (59.1 kg)         Body mass index is 24.6 kg/m². Estimated Creatinine Clearance: 39 mL/min (based on SCr of 0.9 mg/dL). Random: 11.5    Assessment/Plan: Serum creatinine has improved. Will start a regimen. Will initiate Vancomycin 750 mg IV every 24 hours. Level ordered prior to the third dose of this regimen.     Electronically signed by Janey Anderson, [facility] on 1/23/2020 at 3:54 AM

## 2020-01-23 NOTE — PROGRESS NOTES
Patient seems confused tonight. Telemetry called to report wide complex tac of up to 125. Patient had  been up to the bedside potty and did get short of breath. Patient complaining of her feet burning and did say she has bad neuropathy. Charge nurse on 7th called wanted an EKG ran. Sent  Dr Bradshaw a message. Waiting for response.

## 2020-01-24 NOTE — PLAN OF CARE
Problem: Falls - Risk of:  Goal: Will remain free from falls  Description  Will remain free from falls  Outcome: Ongoing  Goal: Absence of physical injury  Description  Absence of physical injury  Outcome: Ongoing     Problem: Breathing Pattern - Ineffective:  Goal: Ability to achieve and maintain a regular respiratory rate will improve  Description  Ability to achieve and maintain a regular respiratory rate will improve  Outcome: Ongoing     Problem: Discharge Planning:  Goal: Discharged to appropriate level of care  Description  Discharged to appropriate level of care  Outcome: Ongoing  Goal: Participates in care planning  Description  Participates in care planning  Outcome: Ongoing     Problem: Airway Clearance - Ineffective:  Goal: Clear lung sounds  Description  Clear lung sounds  Outcome: Ongoing  Goal: Ability to maintain a clear airway will improve  Description  Ability to maintain a clear airway will improve  Outcome: Ongoing     Problem: Fluid Volume - Deficit:  Goal: Achieves intake and output within specified parameters  Description  Achieves intake and output within specified parameters  Outcome: Ongoing     Problem: Gas Exchange - Impaired:  Goal: Levels of oxygenation will improve  Description  Levels of oxygenation will improve  Outcome: Ongoing     Problem: Hyperthermia:  Goal: Ability to maintain a body temperature in the normal range will improve  Description  Ability to maintain a body temperature in the normal range will improve  Outcome: Ongoing     Problem: Tobacco Use:  Goal: Will participate in inpatient tobacco-use cessation counseling  Description  Will participate in inpatient tobacco-use cessation counseling  Outcome: Ongoing     Problem: Pain:  Goal: Pain level will decrease  Description  Pain level will decrease  Outcome: Ongoing  Goal: Control of acute pain  Description  Control of acute pain  Outcome: Ongoing  Goal: Control of chronic pain  Description  Control of chronic

## 2020-01-24 NOTE — PROGRESS NOTES
Called pt's daughter Marek Younger regarding pts DC today and informed her that she will be going home with home health. Pts daughter said she thought she was going to bed going to [facility]. I informed pt I would contact case management and get back with her. I reached out to [place] in case management and he said pt will need to DC home and that pt would have to be private pay in order to DC to SNF facility. Called daughter back and she didn't answer phone. Will try again.

## 2020-01-24 NOTE — PROGRESS NOTES
RN spoke with the patient's family about the course of therapy at home, including the expectations and demands of taking care of the patient. The patient's daughter expressed concerns for caring for her mother, especially during the night when the patient would frequently get herself out of bed. The daughter stated she was worried about the patient falling. Furthermore, the patient was not oriented to place, situation, or time this AM. She initially refused SNF, but is now agreeable to temporary placement. The daughter Juan Acosta) believes this is the best option.    Electronically signed by Lisette Hudson RN on 1/24/2020 at 12:27 PM

## 2020-01-24 NOTE — PROGRESS NOTES
Hospitalist Progress Note  1/24/2020 11:51 AM  Subjective:   Admit Date: 1/20/2020  PCP: Kiersten Saleh DO    Chief Complaint: Dyspnea/hypoxemia    Subjective: Patient seen and examined this a.m., resting in bed fatigued in nature. Nasal cannula oxygen in place. No acute distress overnight per nursing staff. Patient currently weighing options home health versus SNF placement. [facility] History: (Per hospitalist service ) 76-year-old female known to me from previous admissions recently here for NSTEMI with stents placed. Patient has history of COPD requiring 2 L of oxygen with ambulation at home. Patient presenting for acute dyspnea brought to the emergency department found to have right upper lobe pneumonia currently being treated for healthcare acquired pneumonia. -CTA ruled out pulmonary embolism, does reveal right upper lobe pneumonic consolidation with adjacent loculated fluid. While a small bilateral pleural effusions left greater than the right. DME order for wheelchair as well as oxygen placed and present in patient's room. Case management to work on SNF placement     ROS: 14 point review of systems is negative except as specifically addressed above.     DIET CARDIAC; Low Sodium (2 GM)    Intake/Output Summary (Last 24 hours) at 1/24/2020 1151  Last data filed at 1/24/2020 1035  Gross per 24 hour   Intake 100 ml   Output 300 ml   Net -200 ml     Medications:  Current Facility-Administered Medications   Medication Dose Route Frequency Provider Last Rate Last Dose    oxyCODONE-acetaminophen (PERCOCET)  MG per tablet 1 tablet  1 tablet Oral TID PRN Gloria Hammans, MD   1 tablet at 01/23/20 2126    gabapentin (NEURONTIN) capsule 300 mg  300 mg Oral TID PRN Gloria Hammans, MD   300 mg at 01/24/20 0900    sodium chloride flush 0.9 % injection 10 mL  10 mL Intravenous 2 times per day Kamilla Lopez MD   10 mL at 01/24/20 0853    sodium chloride flush 0.9 % injection 10 mL  10 mL 01/23/20 2126    montelukast (SINGULAIR) tablet 10 mg  10 mg Oral Nightly Felisa Chakraborty MD   10 mg at 01/23/20 2126    pantoprazole (PROTONIX) tablet 40 mg  40 mg Oral QAM AC Felisa Chakraborty MD   40 mg at 01/24/20 7050    predniSONE (DELTASONE) tablet 20 mg  20 mg Oral Daily Felisa Chakraborty MD   20 mg at 01/24/20 0849    sennosides-docusate sodium (SENOKOT-S) 8.6-50 MG tablet 1 tablet  1 tablet Oral Daily Felisa Chakraborty MD   1 tablet at 01/24/20 0851    hydrocortisone (ANUSOL-HC) 2.5 % rectal cream   Rectal BID PRN Felisa Chakrabroty MD        albuterol (PROVENTIL) nebulizer solution 2.5 mg  2.5 mg Nebulization Q1H PRN Felisa Chakraborty MD        formoterol (PERFOROMIST) nebulizer solution 20 mcg  20 mcg Nebulization 2 times per day Felisa Chakraborty MD   20 mcg at 01/24/20 0658    budesonide (PULMICORT) nebulizer suspension 500 mcg  0.5 mg Nebulization Q12H eFlisa Chakraborty MD   500 mcg at 01/24/20 8437    ipratropium (ATROVENT) 0.02 % nebulizer solution 0.5 mg  0.5 mg Nebulization 4x daily Felisa Chakraborty MD   0.5 mg at 01/24/20 1035        Labs:     Recent Labs     01/22/20  0602 01/23/20  0147 01/24/20  0447   WBC 10.1 13.1* 10.4   RBC 3.27* 3.76* 3.57*   HGB 9.6* 10.8* 10.5*   HCT 32.4* 36.3* 35.0*   MCV 99.1* 96.5 98.0   MCH 29.4 28.7 29.4   MCHC 29.6* 29.8* 30.0*    364 336     Recent Labs     01/22/20  0602 01/23/20  0147 01/24/20  0447    143 140   K 4.4 4.7 4.3   ANIONGAP 11 12 12    101 99   CO2 26 30* 29   BUN 16 19 24*   CREATININE 0.9 0.9 1.1*   GLUCOSE 123* 96 86   CALCIUM 8.6* 9.5 9.3     No results for input(s): MG, PHOS in the last 72 hours. No results for input(s): AST, ALT, ALB, BILITOT, ALKPHOS, ALB in the last 72 hours. ABGs:No results for input(s): PH, PO2, PCO2, HCO3, BE, O2SAT in the last 72 hours. Troponin T:   No results for input(s): TROPONINI in the last 72 hours. INR:   No results for input(s): INR in the last 72 hours.   Lactic Acid:   No results for input(s): LACTA in the last 72 hours. Objective:   Vitals: /72   Pulse 60   Temp 98.1 °F (36.7 °C) (Temporal)   Resp 18   Ht 5' 1\" (1.549 m)   Wt 127 lb (57.6 kg)   SpO2 95%   BMI 24.00 kg/m²   24HR INTAKE/OUTPUT:      Intake/Output Summary (Last 24 hours) at 1/24/2020 1151  Last data filed at 1/24/2020 1035  Gross per 24 hour   Intake 100 ml   Output 300 ml   Net -200 ml     General appearance: Alert, no acute distress  HEENT: NC/AT  Lungs: no increased work of breathing, diminished breath sounds. Right worse than left. Heart: regular rate and rhythm, S1, S2 normal, no murmur, click, rub or gallop  Abdomen: soft, non-tender; bowel sounds normal; no masses,  no organomegaly  Extremities: Minimal bilateral lower extremity edema nonpitting. Neurologic: AAOx2 (person/place), gross motor and sensory function intact  Skin: warm, no obvious signs of bruising or skin deterioration    Assessment and Plan: Active Problems:    Acute hypoxemic respiratory failure [facility])    Palliative care patient  Resolved Problems:    * No resolved hospital problems. *    Acute hypoxemic respiratory failure likely secondary to pneumonia: Slowly improving, patient does qualify for home O2, DME order placed. We will continue with antibiotic therapy, O2 PRN. Continue empiric antibiotics for HAP. Bronchodilators. Correlated by CTA PE findings. respiratory cultures have been ordered, continue to encourage ambulation, out of bed activity, incentive spirometry. Patient has now decided against home health, requesting SNF placement. CAD status post recent stents: DAPT/statin. Pt denies chest pain currently. COPD: Not in acute exacerbation. O2 PRN. Bronchodilators. Known home O2 (2L) with ambulation as per previous discharge summary. Anxiety/depression: Continue with home medications. CKD stage III: Stable. Monitor BMP.     Advance Directive: Full Code    DVT prophylaxis: Heparin    Discharge planning: TBD      Electronically

## 2020-01-24 NOTE — CARE COORDINATION
[address] Transitions Interview     2020    Patient: Citlali Love Patient : 1935   MRN: [id number]  Reason for Admission:   RARS: Readmission Risk Score: 32         Spoke with: Citlali Love      Readmission Risk  Patient Active Problem List   Diagnosis    [facility]non-[facility] elevated [facility]) [facility]    Unstable angina ([facility]    Pneumonia    Bronchial obstruction    Acute hypoxemic respiratory failure ([facility]    Palliative care patient       Inpatient Assessment  Care Transitions Summary    Care Transitions Inpatient Review  Medication Review  Do you have all of your prescriptions and are they filled?:  Yes   Are you able to afford your medications?:  Yes  How often do you have difficulty taking your medications?:  I always take them as prescribed. Housing Review  Who do you live with?:  Alone, Child  Are you an active caregiver in your home?:  No  Social Support  Do you have a ?:  No  Do you have a [facility]?:  Yes  Maria Areginaflora [address] Name:  [address] [place] [address], [address] to seek help/take action for Emergent/Urgent situations i.e. fire, crime, inclement weather or health crisis.:  Needs Assistance  Ability handle personal hygiene needs (bathing/dressing/grooming):  Dependent  Ability to manage medications:  Dependent  Ability to prepare food:  Dependent  Ability to maintain home (clean home, laundry):  Dependent  Ability to drive and/or has transportation:  Dependent  Ability to do shopping:  Dependent  Ability to manage finances:  Dependent  Is patient able to live independently?:  No  Hearing and Vision  Hearing Impairment:  Hard of hearing  Care Transitions Interventions                                 Follow Up : Met at bedside with Ms. Ramsey, discussed BPCI-A process. Contact information given. Bryan James is agreeable with appropriate follow up after discharge.  Patient is well known from recent hospitalization and
Met with patient to discuss discharge needs. No family member present. Current Readmission Risk score is 28%. Patient lives at home with her daughter, Kaya Ward. Patient states she has a walker and does not expect to need any additional DME. She is currently not using oxygen at home. Per her chart, patient was previously set up with oxygen at [facility]. Patient states she was told she no longer needed oxygen at home, but believes she may need oxygen at home. Message sent to MD requesting home oxygen evaluation prior to discharge. Patient states she is receiving home health services currently, but does not recall the name of the agency. Patient states she is able to afford her medications at this time. At discharge, her daughter will provide transportation. Explained [employer] Worker program.  Provided literature. Patient consented to participate in CHW program.  [facility]   Electronically signed by Marsha Mina RN on 1/21/2020 at 1:35 PM
Pt had [facility] prior to admission and would need new orders at discharge. Thank you!     Misti Dee [facility], [address]: [phone] F: [phone]    Electronically signed by Aurelia Perea on 1/21/2020 at 10:42 AM
Pt unable to dc to SNF at this time d/t no SNF days available and not able to pay private pay at [facility]. Pt also does not qualify for medicaid at this time (6 month penalty). Pt will dc home w/ [facility] to [facility]. Will have home O2 through [facility]. Has s/u for CHW program through [facility]   [address] [place] [address] [address]: [phone] F: [phone]  [facility] Phone [phone] Fax [phone]      Electronically signed by Annita Peacock on 1/24/2020 at 2:19 PM
SW attempted to reach out to pt's daughter Jack Magaña and [facility]  on the phone regarding dc planning. They inquired about pt's home O2. LIANNA informed pt's daughter that pt did qualify for home O2 this admission. [facility]  stated that there were actually 3 O2 concentrators out from [place], and when pt was admitted to hospital, [facility]  took one back to [place]. One concentrator is at the old home that pt's grandson lived in which is now condemned (no one knows where the grandson is, and no one is able to enter the home), and the other concentrator is at [employer]. [facility]  informs that he did tell [facility] this when he took back the third concentrator. LIANNA told both family members that pt will need to be on home O2 24/7, so there is no confusion; family understood and agreed. Pt's daughter inquired about over-bed table and new wheelchair DME. LIANNA stated that over-bed table would likely be private pay and LIANNA would see about a DME order for wheelchair. LIANNA asked MD for order. Will order at [place].     [facility] Phone [phone] Fax [phone]    Electronically signed by Maren Moreland on 1/23/2020 at 11:29 AM
SW spoke w/ pt, pt's daughter, pt's SUNIL and granddaughter at the bedside in regards to dc planning. Plan was to return home w/ [facility], [facility] home O2 and DME. Pt's daughter stated she has been her caregiver ever since they brought her home from [facility] (pt's grandson placed her there previously). Pt's daughter has wanted to be able to keep her home as long as possible. Feel as though pt could benefit from short term PT/OT prior to return home. Pt's daughter and SUNIL are agreeable to this plan, and will bring pt home after completion of rehab. Any [place] facility for referral.    SW faxed referrals and will continue to follow.     Electronically signed by Cordelia Vidal on 1/24/2020 at 12:05 PM
Spoke to patient regarding MD orders for home health services. Patient states she is current with home health already. Per DC planners notes, she is current with [facility]. Called and spoke with Albin. NAZIA orders and hospital information faxed. Please notify [facility] when patient discharges and fax DC Summary and med list.  Anh Rdz  [phone]   Fax  [phone].   Electronically signed by Marquez Franco RN on 1/22/20 at 2:56 PM
for this patient at the time of discharge based on their care needs. Informed patient/family that the [facility] Predict Tool is to be used as a guide and should not alter patient's currently established discharge plan. All questions answered at the time of visit. Daughter very agitated today regarding home medications not being restarted, and advised CTN that she did not want to hear any \"taking up for these doctors or the reason behind them not being given. She needs her medicines! \". Plans for discharge are to go home with home care per daughter. Did discuss that Predict Tool says that greater gains might be received from a rehab stay. Daughter says she wants patient to be the one to make that decision. Did advise daughter that it was something to consider, and she stated she would. Future Appointments   Date Time Provider Amilcar Neely   1/30/2020  3:15 PM BOB Youssef [facility]       Health Maintenance  There are no preventive care reminders to display for this patient.     Perla Murillo RN

## 2020-01-24 NOTE — PROGRESS NOTES
Hospitalist Progress Note  1/24/2020 5:39 PM  Subjective:   Admit Date: 1/20/2020  PCP: Kiersten Saleh DO    Chief Complaint: Dyspnea/hypoxemia    Subjective: Patient seen and examined this a.m., resting in bed fatigued in nature. Nasal cannula oxygen in place. No acute distress overnight per nursing staff. Patient currently weighing options home health versus SNF placement. [facility]: (Per hospitalist service ) 28-year-old female known to me from previous admissions recently here for NSTEMI with stents placed. Patient has history of COPD requiring 2 L of oxygen with ambulation at home. Patient presenting for acute dyspnea brought to the emergency department found to have right upper lobe pneumonia currently being treated for healthcare acquired pneumonia. -CTA ruled out pulmonary embolism, does reveal right upper lobe pneumonic consolidation with adjacent loculated fluid. While a small bilateral pleural effusions left greater than the right. DME order for wheelchair as well as oxygen placed and present in patient's room. Insurance days used up for SNF placement, Pt to DC tomorrow. ROS: 14 point review of systems is negative except as specifically addressed above.     DIET CARDIAC; Low Sodium (2 GM)    Intake/Output Summary (Last 24 hours) at 1/24/2020 1739  Last data filed at 1/24/2020 1413  Gross per 24 hour   Intake 240 ml   Output 300 ml   Net -60 ml     Medications:  Current Facility-Administered Medications   Medication Dose Route Frequency Provider Last Rate Last Dose    levofloxacin (LEVAQUIN) tablet 750 mg  750 mg Oral Every Other Day Gloria Hammans, MD        oxyCODONE-acetaminophen (PERCOCET)  MG per tablet 1 tablet  1 tablet Oral TID PRN Gloria Hammans, MD   1 tablet at 01/24/20 1457    gabapentin (NEURONTIN) capsule 300 mg  300 mg Oral TID PRN Gloria Hammans, MD   300 mg at 01/24/20 1457    sodium chloride flush 0.9 % injection 10 mL  10 mL Intravenous 2 times per day Joanna Gaspar MD   10 mL at 01/24/20 0853    sodium chloride flush 0.9 % injection 10 mL  10 mL Intravenous PRN Joanna Gaspar MD   10 mL at 01/22/20 1116    ondansetron (ZOFRAN) injection 4 mg  4 mg Intravenous Q6H PRN Joanna Gaspar MD        acetaminophen (TYLENOL) tablet 650 mg  650 mg Oral Q4H PRN Joanna Gaspar MD   650 mg at 01/24/20 0859    polyethylene glycol (GLYCOLAX) packet 17 g  17 g Oral Daily PRN Joanna Gaspar MD        melatonin tablet 3 mg  3 mg Oral Nightly PRN Joanna Gaspar MD        heparin (porcine) injection 5,000 Units  5,000 Units Subcutaneous 3 times per day Joanna Gaspar MD   5,000 Units at 01/24/20 0647    acetylcysteine (MUCOMYST) 20 % solution 600 mg  600 mg Inhalation BID Joanna Gaspar MD   600 mg at 01/24/20 0630    ARIPiprazole (ABILIFY) tablet 20 mg  20 mg Oral Daily Joanna Gaspar MD   20 mg at 01/24/20 0853    aspirin EC tablet 81 mg  81 mg Oral Daily Joanna Gaspar MD   81 mg at 01/24/20 0849    atorvastatin (LIPITOR) tablet 40 mg  40 mg Oral Nightly Joanna Gaspar MD   40 mg at 01/23/20 2126    carvedilol (COREG) tablet 3.125 mg  3.125 mg Oral BID WC Joanna Gaspar MD   3.125 mg at 01/24/20 0816    clopidogrel (PLAVIX) tablet 75 mg  75 mg Oral Daily Joanna Gaspar MD   75 mg at 01/24/20 0849    FLUoxetine (PROZAC) capsule 60 mg  60 mg Oral Daily Joanna Gaspar MD   60 mg at 01/24/20 0843    guaiFENesin [facility]) extended release tablet 1,200 mg  1,200 mg Oral BID Joanna Gaspar MD   1,200 mg at 01/24/20 3699    hydrOXYzine (ATARAX) tablet 25 mg  25 mg Oral Q8H PRN Joanna Gaspar MD        isosorbide mononitrate (IMDUR) extended release tablet 30 mg  30 mg Oral Daily Joanna Gaspar MD   30 mg at 01/24/20 3288    mirtazapine (REMERON) tablet 7.5 mg  7.5 mg Oral Nightly Joanna Gaspar MD   7.5 mg at 01/23/20 2126    montelukast (SINGULAIR) tablet 10 mg  10 mg Oral Nightly Joanna Gaspar MD   10 mg at 01/23/20 2126    pantoprazole (PROTONIX)

## 2020-01-24 NOTE — DISCHARGE SUMMARY
Cardiovascular:      Rate and Rhythm: Normal rate. Pulses: Normal pulses. Heart sounds: Normal heart sounds. Pulmonary:      Breath sounds: Wheezing (expiratory in nature upon auscultation ) present. Abdominal:      General: Bowel sounds are normal.      Tenderness: There is no tenderness. There is no guarding or rebound. Musculoskeletal: Normal range of motion. Right lower leg: No edema. Left lower leg: No edema. Skin:     General: Skin is warm. Capillary Refill: Capillary refill takes less than 2 seconds. Findings: No bruising or erythema. Neurological:      General: No focal deficit present. Mental Status: She is alert and oriented to person, place, and time.    Psychiatric:         Mood and Affect: Mood normal.           Discharge Plan   Disposition: Home with home health     Provider Follow-Up:   [facility]  [phone]    On 2/6/2020  AT  8:30     [address]  [address]           Patient Instructions   Diet: regular diet    Activity: activity as tolerated      Discharge Medications         Medication List      START taking these medications    levofloxacin 500 MG tablet  Commonly known as:  LEVAQUIN  Take 1 tablet by mouth every 48 hours for 4 doses     polyethylene glycol packet  Commonly known as:  GLYCOLAX  Take 17 g by mouth daily as needed for Constipation or Other (No BM in 60 hours)        CONTINUE taking these medications    acetylcysteine 20 % nebulizer solution  Commonly known as:  MUCOMYST  Inhale 3 mLs into the lungs 2 times daily for 10 days     aspirin 81 MG tablet     atorvastatin 40 MG tablet  Commonly known as:  LIPITOR  Take 1 tablet by mouth nightly     carvedilol 3.125 MG tablet  Commonly known as:  COREG     clopidogrel 75 MG tablet  Commonly known as:  PLAVIX  Take 1 tablet by mouth daily     gabapentin 300 MG capsule  Commonly known as:  NEURONTIN     guaiFENesin 600 MG extended release tablet  Commonly known as:  MUCINEX     hydrocortisone 2.5 % rectal cream  Commonly known as:  ANUSOL-HC  Place rectally 2 times daily. ipratropium 17 MCG/ACT inhaler  Commonly known as:  ATROVENT HFA     ipratropium-albuterol 0.5-2.5 (3) MG/3ML Soln nebulizer solution  Commonly known as:  DUONEB     isosorbide mononitrate 30 MG extended release tablet  Commonly known as:  IMDUR  Take 1 tablet by mouth daily     mirtazapine 7.5 MG tablet  Commonly known as:  REMERON  Take 1 tablet by mouth nightly     oxyCODONE-acetaminophen  MG per tablet  Commonly known as:  PERCOCET  Take 1 tablet by mouth 3 times daily as needed for Pain for up to 3 days. pantoprazole 40 MG tablet  Commonly known as:  PROTONIX  Take 1 tablet by mouth every morning (before breakfast)     predniSONE 20 MG tablet  Commonly known as:  Aelc Lemon           Where to Get Your Medications      These medications were sent to The [address] [address]    Phone:  [phone]   · hydrocortisone 2.5 % rectal cream  · levofloxacin 500 MG tablet  · polyethylene glycol packet     You can get these medications from any pharmacy    Bring a paper prescription for each of these medications  · oxyCODONE-acetaminophen  MG per tablet         Electronically signed by   [facility]   Internal Medicine Hospitalist  On 1/25/2020  At 7:49 AM    EMR Dragon/Transcription disclaimer:   Much of this encounter note is an electronic transcription/translation of spoken language to printed text.  The electronic translation of spoken language may permit erroneous, or at times, nonsensical words or phrases to be inadvertently transcribed; although attempts have made to review the note for such errors, some may still exist.

## 2020-01-24 NOTE — DISCHARGE INSTR - COC
Continuity of Care Form    Patient Name: Seth Webster   :  1935  MRN:  [id number]    Admit date:  2020  Discharge date:  20    Code Status Order: Full Code   Advance Directives:   [facility] Documentation     Date/Time Healthcare Directive Type of Healthcare Directive Copy in [address] Agent's Name Healthcare Agent's Phone Number    20 0405  Other (Comment) Pt has POA paperwork needing signatures, per daughter. Living will  No, copy requested from family  --  --  --    20 0107  Yes, patient has an advance directive for healthcare treatment  Living will  --  --  --  --          Admitting Physician:  Loreta De Jesus MD  PCP: Carlee Guaman DO    Discharging Nurse: [address] Unit/Room#: 8493/760-74  Discharging Unit Phone Number: [phone]    Emergency Contact:   Extended Emergency Contact Information  Primary Emergency Contact: Chucho Jacklyn  Home Phone: [phone]  Mobile Phone: [phone]  Relation: Child   needed? No    Past Surgical History:  Past Surgical History:   Procedure Laterality Date    CARDIAC CATHETERIZATION      CORONARY ANGIOPLASTY WITH STENT PLACEMENT      x2       Immunization History: There is no immunization history on file for this patient.     Active Problems:  Patient Active Problem List   Diagnosis Code    NSTEMI (non-ST elevated myocardial infarction) ([facility] I21.4    Unstable angina ([facility] I20.0    Pneumonia J18.9    Bronchial obstruction J98.09    Acute hypoxemic respiratory failure ([facility] J96.01    Palliative care patient Z51.5       Isolation/Infection:   Isolation          No Isolation        Patient Infection Status     None to display          Nurse Assessment:  Last Vital Signs: /72   Pulse 60   Temp 98.1 °F (36.7 °C) (Temporal)   Resp 18   Ht 5' 1\" (1.549 m)   Wt 127 lb (57.6 kg)   SpO2 95%   BMI 24.00 kg/m²     Last documented pain score (0-10 scale): Pain Level: 5  Last Weight:   Wt Readings from Last 1 Encounters:   01/24/20 127 lb (57.6 kg)     Mental Status:  disoriented    IV Access:  - None    Nursing Mobility/ADLs:  Walking   Assisted  Transfer  Assisted  Bathing  Assisted  Dressing  Assisted  Toileting  Assisted  Feeding  Independent  Med Admin  Dependent  Med Delivery   crushed    Wound Care Documentation and Therapy:        Elimination:  Continence:   · Bowel: intermittent incontinence   · Bladder: intermittent incontinence  Urinary Catheter: None   Colostomy/Ileostomy/Ileal Conduit: No       Date of Last BM: 1/23/20    Intake/Output Summary (Last 24 hours) at 1/24/2020 1331  Last data filed at 1/24/2020 1035  Gross per 24 hour   Intake 100 ml   Output 300 ml   Net -200 ml     I/O last 3 completed shifts: In: 160 [P.O.:160]  Out: 1325 [Urine:1325]    Safety Concerns:     History of Falls (last 30 days) and At Risk for Falls    Impairments/Disabilities:      None    Nutrition Therapy:  Current Nutrition Therapy:   - Oral Diet:  Low Sodium (2gm)    Routes of Feeding: Oral  Liquids: Thin Liquids  Daily Fluid Restriction: no  Last Modified Barium Swallow with Video (Video Swallowing Test): not done    Treatments at the Time of Hospital Discharge:   Respiratory Treatments: nebs  Oxygen Therapy:  is on oxygen at 2-3 L/min per nasal cannula.   Ventilator:    - No ventilator support    Rehab Therapies: Physical Therapy and Occupational Therapy  Weight Bearing Status/Restrictions: No weight bearing restirctions  Other Medical Equipment (for information only, NOT a DME order):  walker  Other Treatments: n/a    Patient's personal belongings (please select all that are sent with patient):  None    RN SIGNATURE:  Electronically signed by Ara Chi RN on 1/24/20 at 1:34 PM    CASE MANAGEMENT/SOCIAL WORK SECTION    Inpatient Status Date: ***    Readmission Risk Assessment Score:  Readmission Risk              Risk of Unplanned Readmission:        25

## 2020-01-24 NOTE — PROGRESS NOTES
Physical Therapy  Josh Wade  [id number]     01/24/20 1129   Subjective   Subjective Attempt, patient declined therapy at this time.    Electronically signed by Db Gongora PTA on 1/24/2020 at 11:30 AM

## 2020-01-25 ENCOUNTER — NURSE TRIAGE (OUTPATIENT)
Dept: CALL CENTER | Facility: HOSPITAL | Age: 85
End: 2020-01-25

## 2020-01-25 NOTE — PLAN OF CARE
Problem: Falls - Risk of:  Goal: Will remain free from falls  Description  Will remain free from falls  1/24/2020 1331 by Larisa Márquez RN  Outcome: Completed  1/24/2020 1207 by Larisa Márquez RN  Outcome: Ongoing  Goal: Absence of physical injury  Description  Absence of physical injury  1/24/2020 1331 by Larisa Márquez RN  Outcome: Completed  1/24/2020 1207 by Larisa Márquez RN  Outcome: Ongoing     Problem: Breathing Pattern - Ineffective:  Goal: Ability to achieve and maintain a regular respiratory rate will improve  Description  Ability to achieve and maintain a regular respiratory rate will improve  1/24/2020 1331 by Larisa Márquez RN  Outcome: Completed  1/24/2020 1207 by Larisa Márquez RN  Outcome: Ongoing     Problem: Discharge Planning:  Goal: Discharged to appropriate level of care  Description  Discharged to appropriate level of care  1/24/2020 1331 by Larisa Márquez RN  Outcome: Completed  1/24/2020 1207 by Larisa Márquez RN  Outcome: Ongoing  Goal: Participates in care planning  Description  Participates in care planning  1/24/2020 1331 by Larisa Márquez RN  Outcome: Completed  1/24/2020 1207 by Larisa Márquez RN  Outcome: Ongoing     Problem: Airway Clearance - Ineffective:  Goal: Clear lung sounds  Description  Clear lung sounds  1/24/2020 1331 by Larisa Márquez RN  Outcome: Completed  1/24/2020 1207 by Larisa Márquez RN  Outcome: Ongoing  Goal: Ability to maintain a clear airway will improve  Description  Ability to maintain a clear airway will improve  1/24/2020 1331 by Larisa Márquez RN  Outcome: Completed  1/24/2020 1207 by Larisa Márquez RN  Outcome: Ongoing     Problem: Fluid Volume - Deficit:  Goal: Achieves intake and output within specified parameters  Description  Achieves intake and output within specified parameters  1/24/2020 1331 by Larisa Márquez RN  Outcome:

## 2020-01-25 NOTE — PROGRESS NOTES
going to take care of her, that they had an arrangement of patient paying Clayton Montoya $1000 a month, and that her heart hurts from hearing that Clayton Montoya is refusing to care for patient. Patient then asked this worker to leave her alone. Informed Bradley Altamirano of the conversation with patient. Advised them to involve the hospital  on call in light of patient being discharged and not having a safe discharge plan. (Did note that patient has been accepted for home care per [facility] and that a discharge summary has been sent to [facility]). This worker informed Clayton Montoya that patient is not appropriate for Hospice, that the hospital  on call has been involved, and that Clayton Montoya can continue discussing patient's discharge with Kunal Hester RN Charge Nurse. Clayton Montoya stated to this worker that she is concerned for her 's health as [place] was concerned that's he was having a heart attack. Cameron Lupe to call an ambulance for her . Clayton Montoya later called this worker and reported that she and her  are upset regarding today's events, that she took her 's blood pressure, and her own, and that she believes they are both just anxious. Provided Clayton Montoya with the 4th floor's contact information so that she can continue discussing patient's discharge with Kunal Hester. No further needs noted this date per this worker.

## 2020-01-26 NOTE — TELEPHONE ENCOUNTER
"    Reason for Disposition  • Health Information question, no triage required and triager able to answer question    Additional Information  • Negative: [1] Caller is not with the adult (patient) AND [2] reporting urgent symptoms  • Negative: Lab result questions  • Negative: Medication questions  • Negative: Caller can't be reached by phone  • Negative: Caller has already spoken to PCP or another triager  • Negative: RN needs further essential information from caller in order to complete triage  • Negative: Requesting regular office appointment  • Negative: [1] Caller requesting NON-URGENT health information AND [2] PCP's office is the best resource  • Negative: General information question, no triage required and triager able to answer question    Answer Assessment - Initial Assessment Questions  1. REASON FOR CALL or QUESTION: \"What is your reason for calling today?\" or \"How can I best help you?\" or \"What question do you have that I can help answer?\"      Son-In-Law Lawrence Sutherland is the caller.  His mother-in-law is at [facility] about to be discharged.  Family is concerned about her care at the home she is to be discharged to.  He thinks she would be better cared for at a nursing home and is asking questions how to get this achieved.  Advised he contact at [facility] the person on call for  and also the patient's .    Protocols used: INFORMATION ONLY CALL-ADULT-      "

## 2020-01-27 NOTE — CARE COORDINATION
Spoke with Dr. Roger Alfaro, Hospitalist to make him aware of patient needs post-discharge. Medications needing to be faxed to [address] [place] [address] are Mucomyst, Atrovent inhaler, Duonebs, and prednisone. Nebulizer order needs to be faxed to [facility]. Dr. Roger Alfaro stated he faxed to said destinations. Will follow up with patient at a later time.

## 2020-01-27 NOTE — CARE COORDINATION
[facility] Transitions Initial Follow Up Call    Call within 2 business days of discharge: Yes    Patient: Vinnie Cranker Patient : 1935   MRN: [id number]  Reason for Admission: hypoxia  Discharge Date: 20 RARS: Readmission Risk Score: 26      Last Discharge [facility]       Complaint Diagnosis Description Type Department Provider    20 Shortness of Breath; Fatigue Hypoxia . .. ED to Hosp-Admission (Discharged) (ADMITTED) [facility] Blayne Menendez MD; Chris Powell. .. Spoke with: Vinnie Cranker daughter     Facility: [place] [address]      Non-face-to-face services provided:  Reviewed encounter information for continuity of care prior to follow up phone call - chart notes, consults, progress notes, test results, med list, appointments, AVS, other information. Care Transitions 24 Hour Call    Do you have any ongoing symptoms?:  No  Do you have a copy of your discharge instructions?:  Yes  Do you have all of your prescriptions and are they filled?:  Yes  Have you been contacted by a [address]?:  No  Have you scheduled your follow up appointment?:  Yes  How are you going to get to your appointment?:  Car - family or friend to transport  Were you discharged with any Home Care or Post Acute Services:  Yes  Post Acute Services:  Home Health  Patient Home Equipment:  Oxygen  Do you have support at home?:  Alone, Child  Do you feel like you have everything you need to keep you well at home?:  Yes  Are you an active caregiver in your home?:  No  Care Transitions Interventions                                 Follow Up : Spoke with patient's daughter Brody Zavala today for initial follow up phone call after discharge. She says her Mom is doing so much better. She says she is more alert than when she was in the hospital. She says Marilyn Florse from [facility] is in the home today.  Attempted to go over meds, but she was not sure what they are, someone else

## 2020-01-28 NOTE — CARE COORDINATION
Spoke with Allyssa at [facility], and she says they have received the order for nebulizer for patient, and also order from [employer] for Wheelchair and oxygen. They should be delivering to patient today.

## 2020-01-28 NOTE — CARE COORDINATION
[facility] Transitions Follow Up Call    2020    Patient: Dieter Davis  Patient : 1935   MRN: [id number]  Reason for Admission:   Discharge Date: 20 RARS: Readmission Risk Score: 32         Spoke with: [place] son in law and daughter Cherise Tovar and Final Call    Subsequent and Final Calls  Do you have any ongoing symptoms?:  No  Have your medications changed?:  No  Do you have any questions related to your medications?:  No  Do you currently have any active services?:  Yes  Are you currently active with any services?:  Home Health  Do you have any needs or concerns that I can assist you with?:  No  Identified Barriers:  None  Care Transitions Interventions                          Other Interventions: Follow Up : Spoke with son in law regarding patient follow up. He says patient got up at 4am and they fed her, she took a pain pill and went back to sleep. He says they are getting ready to get her up to feed her lunch. HFU is scheduled for next week with Dr. Juan Silvestre, and they will be taking her. Nebulizer was to be faxed to [facility] and medications to [facility]. They have not picked meds up yet, and have not called regarding nebulizer. Instructed son in law to do so, he says he is going to  daughter's meds today and will  patient's as well. CTN will follow up on these as well. Encouraged to call with any needs prn. Will follow up at a later time.    Future Appointments   Date Time Provider Amilcar Neely   2020  3:15 PM BOB Hernandez Cardio [facility]-RULA Zamudio RN

## 2020-01-28 NOTE — CARE COORDINATION
Spoke with Channing Owen at [place] [address] and she states patients meds have been filled, all but one that had to be ordered. Should be in today. Family to be picking up meds today.

## 2020-02-03 NOTE — CARE COORDINATION
[facility] Transitions Follow Up Call    2/3/2020    Patient: Kulwinder Connors  Patient : 1935   MRN: <[id number]>  Reason for Admission:   Discharge Date: 20 RARS: Readmission Risk Score: 26      Follow Up: Attempted to contact patient for BPCI-A follow up. Unable to reach patient. Left message on voicemail with contact information and request for call back.       Future Appointments   Date Time Provider Amilcar Neely   2020 10:00 221 BOB Polanco [id number]       Joy Marc RN

## 2020-02-04 NOTE — ED PROVIDER NOTES
Mother     No Known Problems Father           SOCIAL HISTORY       Social History     Socioeconomic History    Marital status:      Spouse name: None    Number of children: None    Years of education: None    Highest education level: None   Occupational History    None   Social Needs    Financial resource strain: None    Food insecurity:     Worry: None     Inability: None    Transportation needs:     Medical: None     Non-medical: None   Tobacco Use    Smoking status: Former Smoker     Packs/day: 1.00    Smokeless tobacco: Never Used    Tobacco comment: Pt quit 6 weeks ago per family report. Substance and Sexual Activity    Alcohol use: Never    Drug use: Never    Sexual activity: Not Currently     Comment: She states that she has no children.    Lifestyle    Physical activity:     Days per week: None     Minutes per session: None    Stress: None   Relationships    Social connections:     Talks on phone: None     Gets together: None     Attends Alevism service: None     Active member of club or organization: None     Attends meetings of clubs or organizations: None     Relationship status: None    Intimate partner violence:     Fear of current or ex partner: None     Emotionally abused: None     Physically abused: None     Forced sexual activity: None   Other Topics Concern    None   Social History Narrative    CODE STATUS: DNR-CCA    Health Care Proxy: Mrs. Maribel Solis, cousin, [phone]         twice spouses     Has 2 daughters and 1 son    Work 28 years [employer]    Education high school    [address] [place] [address] [place]    Lives in a nursing home    Does not presently drive an automobile    RadhaNancy Zoie 46 with a walker    Denies alcohol or tobacco consumption or substance usage       SCREENINGS             PHYSICAL EXAM    (up to 7 for level 4, 8 or more for level 5)     ED Triage Vitals [20 0911]   BP Temp Temp src Pulse Resp SpO2 Height Weight   (!) 162/84 97.3 °F (36.3 structurally normal.   Severely dilated left atrium. Severe concentric left ventricular hypertrophy. Left ventricular ejection fraction is estimated at 50%. E/A flow reversal pattern consistent with Grade I diastolic dysfunction. No regional wall motion abnormalities.     EMERGENCY DEPARTMENT COURSE and DIFFERENTIALDIAGNOSIS/MDM:   Vitals:    Vitals:    02/04/20 1002 02/04/20 1154 02/04/20 1205 02/04/20 1320   BP: (!) 157/76 (!) 142/87 (!) 153/60 139/72   Pulse: 81 82 84 78   Resp: 24 29 29 18   Temp:    97.5 °F (36.4 °C)   TempSrc:    Tympanic   SpO2: 95% 94% 94% 97%   Weight:       Height:           MDM      CONSULTS:  None    PROCEDURES:  Unless otherwise notedbelow, none     Procedures    FINAL IMPRESSION     1. COPD exacerbation (HCC)    2. Elevated brain natriuretic peptide (BNP) level          DISPOSITION/PLAN   DISPOSITION Decision To Discharge 02/04/2020 12:04:01 PM      PATIENT REFERRED TO:  DO Tobias Sanz      as scheduled      DISCHARGE MEDICATIONS:       Discharge Medication List as of 2/4/2020 12:36 PM      CONTINUE these medications which have CHANGED    Details   predniSONE (DELTASONE) 20 MG tablet Take 1 tablet by mouth 2 times daily for 5 days, Disp-10 tablet, R-0Print      guaiFENesin (MUCINEX) 600 MG extended release tablet Take 1 tablet by mouth 2 times daily for 15 days, Disp-30 tablet, R-0Print              Medication List      START taking these medications    doxycycline hyclate 100 MG tablet  Commonly known as:  VIBRA-TABS  Take 1 tablet by mouth 2 times daily for 10 days     guaiFENesin 600 MG extended release tablet  Commonly known as:  MUCINEX  Take 1 tablet by mouth 2 times daily for 15 days     predniSONE 20 MG tablet  Commonly known as:  DELTASONE  Take 1 tablet by mouth 2 times daily for 5 days        ASK your doctor about these medications    acetylcysteine 20 % nebulizer solution  Commonly known as:  MUCOMYST  Inhale 3 mLs into the lungs 2 times daily for 10 days     aspirin 81 MG tablet     atorvastatin 40 MG tablet  Commonly known as:  LIPITOR  Take 1 tablet by mouth nightly     carvedilol 3.125 MG tablet  Commonly known as:  COREG     clopidogrel 75 MG tablet  Commonly known as:  PLAVIX  Take 1 tablet by mouth daily     gabapentin 300 MG capsule  Commonly known as:  NEURONTIN     hydrocortisone 2.5 % rectal cream  Commonly known as:  ANUSOL-HC  Place rectally 2 times daily.      ipratropium 17 MCG/ACT inhaler  Commonly known as:  ATROVENT HFA  Inhale 1 puff into the lungs every 6 hours as needed for Wheezing     ipratropium-albuterol 0.5-2.5 (3) MG/3ML Soln nebulizer solution  Commonly known as:  DUONEB  Inhale 3 mLs into the lungs every 6 hours as needed for Shortness of Breath     isosorbide mononitrate 30 MG extended release tablet  Commonly known as:  IMDUR  Take 1 tablet by mouth daily     mirtazapine 7.5 MG tablet  Commonly known as:  REMERON  Take 1 tablet by mouth nightly     oxyCODONE-acetaminophen  MG per tablet  Commonly known as:  PERCOCET     pantoprazole 40 MG tablet  Commonly known as:  PROTONIX  Take 1 tablet by mouth every morning (before breakfast)     polyethylene glycol packet  Commonly known as:  GLYCOLAX  Take 17 g by mouth daily as needed for Constipation or Other (No BM in 60 hours)           Where to Get Your Medications      You can get these medications from any pharmacy    Bring a paper prescription for each of these medications  · doxycycline hyclate 100 MG tablet  · guaiFENesin 600 MG extended release tablet  · predniSONE 20 MG tablet           (Pleasenote that portions of this note were completed with a voice recognition program.  Efforts were made to edit the dictations but occasionally words are mis-transcribed.)              Louis Caldera, APRN  02/04/20 1805

## 2020-02-06 PROBLEM — J43.8 OTHER EMPHYSEMA (HCC): Status: ACTIVE | Noted: 2020-01-01

## 2020-02-06 NOTE — PATIENT INSTRUCTIONS
Return in about 2 weeks (around 2/20/2020) for APRN. Lasix 20mg daily for fluid    Do not hold either your Aspirin or your Plavix for any elective procedure for one year following your stent. OK to take an extra Lasix for weight gain over 3lbs in 24 hours or 5lbs over a week. If weight is not improving by the next day, call the office.    - Weigh daily and report weight gain of 3lbs or more in 24hrs or 5lbs in one week. - Call for increasing shortness of breath or increasing swelling in feet and legs. (This could mean you are retaining too much fluid)  - 2000mg low sodium diet  - Fluid restriction of 1500ml per day (about 6 cups of fluid per day)    Patient Education        Heart Failure: Care Instructions  Your Care Instructions    Heart failure occurs when your heart does not pump as much blood as the body needs. Failure does not mean that the heart has stopped pumping but rather that it is not pumping as well as it should. Over time, this causes fluid buildup in your lungs and other parts of your body. Fluid buildup can cause shortness of breath, fatigue, swollen ankles, and other problems. By taking medicines regularly, reducing sodium (salt) in your diet, checking your weight every day, and making lifestyle changes, you can feel better and live longer. Follow-up care is a key part of your treatment and safety. Be sure to make and go to all appointments, and call your doctor if you are having problems. It's also a good idea to know your test results and keep a list of the medicines you take. How can you care for yourself at home? Medicines    · Be safe with medicines. Take your medicines exactly as prescribed.  Call your doctor if you think you are having a problem with your medicine.     · Do not take any vitamins, over-the-counter medicine, or herbal products without talking to your doctor first. Ming Areas not take ibuprofen (Advil or Motrin) and naproxen (Aleve) without talking to your doctor first. They could make your heart failure worse.     · You may take some of the following medicine. ? Angiotensin-converting enzyme inhibitors (ACEIs) or angiotensin II receptor blockers (ARBs) reduce the heart's workload, lower blood pressure, and reduce swelling. Taking an ACEI or ARB may lower your chance of needing to be hospitalized. ? Beta-blockers can slow heart rate, decrease blood pressure, and improve your condition. Taking a beta-blocker may lower your chance of needing to be hospitalized. ? Diuretics, also called water pills, reduce swelling.    You will get more details on the specific medicines your doctor prescribes. Diet    · Your doctor may suggest that you limit sodium. Your doctor can tell you how much sodium is right for you. An example is less than 3,000 mg a day. This includes all the salt you eat in cooking or in packaged foods. People get most of their sodium from processed foods. Fast food and restaurant meals also tend to be very high in sodium.     · Ask your doctor how much liquid you can drink each day. You may have to limit liquids.    Weight    · Weigh yourself without clothing at the same time each day. Record your weight. Call your doctor if you have a sudden weight gain, such as more than 2 to 3 pounds in a day or 5 pounds in a week. (Your doctor may suggest a different range of weight gain.) A sudden weight gain may mean that your heart failure is getting worse.    Activity level    · Start light exercise (if your doctor says it is okay). Even if you can only do a small amount, exercise will help you get stronger, have more energy, and manage your weight and your stress. Walking is an easy way to get exercise. Start out by walking a little more than you did before. Bit by bit, increase the amount you walk.     · When you exercise, watch for signs that your heart is working too hard. You are pushing yourself too hard if you cannot talk while you are exercising.  If you become short of breath or dizzy or have chest pain, stop, sit down, and rest.     · If you feel \"wiped out\" the day after you exercise, walk slower or for a shorter distance until you can work up to a better pace.     · Get enough rest at night. Sleeping with 1 or 2 pillows under your upper body and head may help you breathe easier.    Lifestyle changes    · Do not smoke. Smoking can make a heart condition worse. If you need help quitting, talk to your doctor about stop-smoking programs and medicines. These can increase your chances of quitting for good. Quitting smoking may be the most important step you can take to protect your heart.     · Limit alcohol to 2 drinks a day for men and 1 drink a day for women. Too much alcohol can cause health problems.     · Avoid getting sick from colds and the flu. Get a pneumococcal vaccine shot. If you have had one before, ask your doctor whether you need another dose. Get a flu shot each year. If you must be around people with colds or the flu, wash your hands often. When should you call for help? Call 911 if you have symptoms of sudden heart failure such as:    · You have severe trouble breathing.     · You cough up pink, foamy mucus.     · You have a new irregular or rapid heartbeat.    Call your doctor now or seek immediate medical care if:    · You have new or increased shortness of breath.     · You are dizzy or lightheaded, or you feel like you may faint.     · You have sudden weight gain, such as more than 2 to 3 pounds in a day or 5 pounds in a week. (Your doctor may suggest a different range of weight gain.)     · You have increased swelling in your legs, ankles, or feet.     · You are suddenly so tired or weak that you cannot do your usual activities.    Watch closely for changes in your health, and be sure to contact your doctor if you develop new symptoms. Where can you learn more? Go to https://julia.[employer]. org and sign in to your [employer] account.  Enter O022 in the [facility] box to learn more about \"Heart Failure: Care Instructions. \"     If you do not have an account, please click on the \"Sign Up Now\" link. Current as of: April 9, 2019  Content Version: 12.3  © 0575-7637 Healthwise, Incorporated. Care instructions adapted under license by [facility] ([facility]). If you have questions about a medical condition or this instruction, always ask your healthcare professional. [address] any warranty or liability for your use of this information.

## 2020-02-06 NOTE — PROGRESS NOTES
 isosorbide mononitrate (IMDUR) 30 MG extended release tablet Take 1 tablet by mouth daily 30 tablet 3    mirtazapine (REMERON) 7.5 MG tablet Take 1 tablet by mouth nightly 30 tablet 3    atorvastatin (LIPITOR) 40 MG tablet Take 1 tablet by mouth nightly 30 tablet 3    clopidogrel (PLAVIX) 75 MG tablet Take 1 tablet by mouth daily 30 tablet 3    pantoprazole (PROTONIX) 40 MG tablet Take 1 tablet by mouth every morning (before breakfast) 30 tablet 3    aspirin 81 MG tablet Take 81 mg by mouth daily      carvedilol (COREG) 3.125 MG tablet Take 3.125 mg by mouth 2 times daily (with meals)       No current facility-administered medications for this visit. Allergies: Lisinopril and Penicillins    Review of Systems  Review of Systems   Constitutional: Positive for unexpected weight change. Negative for activity change, diaphoresis, fatigue and fever. HENT: Negative for facial swelling and nosebleeds. Eyes: Negative for redness and visual disturbance. Respiratory: Positive for shortness of breath (chronic). Negative for cough, chest tightness and wheezing. Cardiovascular: Positive for leg swelling. Negative for chest pain and palpitations. Gastrointestinal: Negative for abdominal pain, nausea and vomiting. Endocrine: Negative for cold intolerance and heat intolerance. Genitourinary: Negative for dysuria and hematuria. Musculoskeletal: Negative for arthralgias and myalgias. Skin: Negative for pallor and rash. Neurological: Negative for dizziness, seizures, syncope, weakness and light-headedness. Hematological: Does not bruise/bleed easily. Psychiatric/Behavioral: Negative for agitation. The patient is not nervous/anxious.         Objective  Vital Signs - BP (!) 150/84   Pulse 80   Ht 5' (1.524 m)   Wt 133 lb (60.3 kg)   Breastfeeding No   BMI 25.97 kg/m²    Wt Readings from Last 3 Encounters:   02/06/20 133 lb (60.3 kg)   02/04/20 109 lb (49.4 kg)   01/25/20 125 lb 2 oz (56.8 kg) ounces daily. She was provided a scale in the office today. She has family members with her who states they will help her follow through    Hypertension- fair control today. Adding Lasix    Hyperlipidemia-on statin therapy. Repeat lipid panel at next visit    Julianne 40 recent exacerbation and pneumonia. Following with PCP    Plan    Return in about 2 weeks (around 2/20/2020) for BOB. Lasix 20mg daily for fluid    Do not hold either your Aspirin or your Plavix for any elective procedure for one year following your stent. OK to take an extra Lasix for weight gain over 3lbs in 24 hours or 5lbs over a week. If weight is not improving by the next day, call the office.    - Weigh daily and report weight gain of 3lbs or more in 24hrs or 5lbs in one week. - Call for increasing shortness of breath or increasing swelling in feet and legs. (This could mean you are retaining too much fluid)  - 2000mg low sodium diet  - Fluid restriction of 1500ml per day (about 6 cups of fluid per day)    Call with any questionsor concerns  Follow up with Nohemi Inman, DO for non cardiac problems  Report any new problems  Cardiovascular Fitness-Exercise as tolerated. Strive for 15 minutes of exercise most days of the week. Cardiac / HealthyDiet  Continue current medications as directed  Continue plan of treatment  It is always recommended that you bring your medicationsbottles with you to each visit - this is for your safety!        BOB Borges

## 2020-02-14 PROBLEM — Z95.5 S/P CORONARY ARTERY STENT PLACEMENT: Status: ACTIVE | Noted: 2020-01-01

## 2020-02-14 PROBLEM — I50.22 CHRONIC SYSTOLIC HEART FAILURE (HCC): Status: ACTIVE | Noted: 2020-01-01

## 2020-02-14 PROBLEM — I25.10 CAD IN NATIVE ARTERY: Status: ACTIVE | Noted: 2020-01-01

## 2020-02-14 PROBLEM — R07.9 CHEST PAIN WITH MODERATE RISK FOR CARDIAC ETIOLOGY: Status: ACTIVE | Noted: 2020-01-01

## 2020-02-14 NOTE — PROGRESS NOTES
Cardiology CHF Nurse to visit Miss Anny Stoll. She had recent stents with Dr. Ida Hernandez. And follow up visit. She is denying chest pain, seems to have wondering thoughts at times. Was talkative and was concerned for her daughter and son in laws relationship with her being sick and requiring so much care. No family present in room. She was unable to discuss recent increase in Lasix and was not aware of what medications she takes. Will follow as appropriate.

## 2020-02-14 NOTE — H&P
weeks ago per family report. Substance and Sexual Activity    Alcohol use: Never    Drug use: Never    Sexual activity: Not Currently     Comment: She states that she has no children.    Lifestyle    Physical activity:     Days per week: Not on file     Minutes per session: Not on file    Stress: Not on file   Relationships    Social connections:     Talks on phone: Not on file     Gets together: Not on file     Attends [employer] service: Not on file     Active member of club or organization: Not on file     Attends meetings of clubs or organizations: Not on file     Relationship status: Not on file    Intimate partner violence:     Fear of current or ex partner: Not on file     Emotionally abused: Not on file     Physically abused: Not on file     Forced sexual activity: Not on file   Other Topics Concern    Not on file   Social History Narrative    CODE STATUS: DNR-CCA    Health Care Proxy: Mrs. Larisa Morataya, cousin, [phone]         twice spouses     Has 2 daughters and 1 son    Work 28 years [employer]    Education high school    [address] [facility] [place]    Lives in a nursing home    Does not presently drive an automobile    [address] with a walker    Denies alcohol or tobacco consumption or substance usage        FAMILY HISTORY:  Family History   Problem Relation Age of Onset    Cancer Mother     No Known Problems Father          ALLERGIES:  Allergies   Allergen Reactions    Lisinopril     Penicillins         PRIOR TO ADMISSION MEDS:  Medications Prior to Admission: furosemide (LASIX) 20 MG tablet, Take 1 tablet by mouth daily  oxyCODONE-acetaminophen (PERCOCET)  MG per tablet, Take 1 tablet by mouth every 4 hours as needed for Pain.  guaiFENesin (MUCINEX) 600 MG extended release tablet, Take 1 tablet by mouth 2 times daily for 15 days  ipratropium (ATROVENT HFA) 17 MCG/ACT inhaler, Inhale 1 puff into the lungs every 6 hours as needed for Wheezing  ipratropium-albuterol (DUONEB) 0.5-2.5 (3) MG/3ML SOLN nebulizer solution, Inhale 3 mLs into the lungs every 6 hours as needed for Shortness of Breath  hydrocortisone (ANUSOL-HC) 2.5 % rectal cream, Place rectally 2 times daily. polyethylene glycol (GLYCOLAX) packet, Take 17 g by mouth daily as needed for Constipation or Other (No BM in 60 hours)  gabapentin (NEURONTIN) 300 MG capsule, Take 300 mg by mouth 3 times daily as needed.   mirtazapine (REMERON) 7.5 MG tablet, Take 1 tablet by mouth nightly  clopidogrel (PLAVIX) 75 MG tablet, Take 1 tablet by mouth daily  pantoprazole (PROTONIX) 40 MG tablet, Take 1 tablet by mouth every morning (before breakfast)  aspirin 81 MG tablet, Take 81 mg by mouth daily  carvedilol (COREG) 3.125 MG tablet, Take 3.125 mg by mouth 2 times daily (with meals)  doxycycline hyclate (VIBRA-TABS) 100 MG tablet, Take 1 tablet by mouth 2 times daily for 10 days  acetylcysteine (MUCOMYST) 20 % nebulizer solution, Inhale 3 mLs into the lungs 2 times daily for 10 days  isosorbide mononitrate (IMDUR) 30 MG extended release tablet, Take 1 tablet by mouth daily  atorvastatin (LIPITOR) 40 MG tablet, Take 1 tablet by mouth nightly     REVIEW OF SYSTEMS:  Constitutional:  No fevers, chills, nausea, vomiting, + tiredness and fatigue   Head:  No head injury, facial trauma   Eyes:  No acute visual changes, exudate, trauma   Ears:  No acute hearing loss, earaches   Nose: No nasal discharge, epistaxis   Neck: No new hoarseness, voice change, or new masses   Lungs:   No hemoptysis, pleurisy, + SOB   Heart:  + chest pressure with exertion, palpitations,    Abdomen:   No new masses, no bright red blood per rectum   Extremities: + mild difficulty while ambulating, no new lesions   Skin: No new changes in skin color, no rashes or lesions   Neurologic: No new motor or sensory changes       PHYSICAL EXAM:  BP (!) 103/56   Pulse 62   Temp 96.9 °F (36.1 °C) (Temporal)   Resp 20   Ht 5' (1.524 m)   Wt 133 lb (60.3 kg)   SpO2 97%   BMI 25.97 kg/m²   No intake/output data recorded. PHYSICAL EXAMINATION:    Vital Signs: Please see the chart   ERIN:  Awake, alert, oriented x 3, patient appears tired and fatigued   Head/Eyes:  Normocephalic, atraumatic, EOMI and PERRLA bilaterally   ENT: Moist mucous membranes, nasal passages clear   Neck: Supple, full range of motion, no carotid bruit, trachea midline   Respiratory:   Bilateral fair air entry in both lung fields, mild B/L crackles, symmetric expansion of chest   Cardiovascular:  Regular rate and rhythm, S1+S2+0, no rubs   Urology: No bilateral CVA tenderness, no suprapubic tenderness   Abdomen:   Soft, non-tender, bowel sounds +ve, no organomegaly   Muscle/Joints: Moves all, decreased range of motion, no muscle spasms   Extremities: No clubbing, no cyanosis, no calf tenderness, + trace B/L pitting  edema   Pulses: 2+ bilaterally, symmetrical   Skin: Warm, dry, no pallor/cyanosis/jaundice, no rashes/lesions   Neurologic: Awake, alert, oriented x 3, cranial nerves II-XII intact, no focal neurological deficits, sensory system intact   Psychiatric: Normal mood, non-suicidal         LABORATORY DATA:    CBC:  Recent Labs     02/14/20  1039   WBC 10.9*   HGB 10.1*   HCT 34.6*        BMP:  Recent Labs     02/14/20  1039      K 3.7   CL 97*   CO2 35*   BUN 31*   CREATININE 1.4*   CALCIUM 8.5*     Recent Labs     02/14/20  1039   AST 25   ALT 15   BILITOT 0.5   ALKPHOS 103     Coag Panel: No results for input(s): INR, PROTIME, APTT in the last 72 hours.   Cardiac Enzymes:   Recent Labs     02/14/20  1039 02/14/20  1150   TROPONINI 0.05* 0.04*     ABGs:  Lab Results   Component Value Date    PHART 7.450 02/04/2020    PO2ART 70.0 02/04/2020    DHS5SIW 40.0 02/04/2020     Urinalysis:  Lab Results   Component Value Date    NITRU Negative 02/04/2020    WBCUA 2 02/04/2020    BACTERIA NEGATIVE 02/04/2020    RBCUA 1 02/04/2020    BLOODU MODERATE 02/04/2020    SPECGRAV 1.013 02/04/2020    GLUCOSEU Negative 02/04/2020       EKG:   Please see the chart      IMAGING:        Xr Chest Standard (2 Vw)    Result Date: 2/14/2020  1. Similar to mildly increased basilar opacity and effusion. Similar right upper lobe opacity. 2. Similar markedly enlarged cardiac silhouette. 3. Similar height loss of T12 and L1 compared 1/20/2020. Signed by Dr Belinda Nichole on 2/14/2020 12:47 PM    Ct Head Wo Contrast    Result Date: 2/14/2020  No acute intracranial abnormality. Chronic white matter ischemic changes similar to the previous study. Maxillary sinusitis. Signed by Dr Rudi Banuelos on 2/14/2020 10:47 AM        Assessment and Plan:    Principal Problem:    Chest pain with moderate risk for cardiac etiology  Active Problems:    CAD in native artery    S/P coronary artery stent placement  Admit patient to medical unit under observation status  Continuous cardiac tele-monitoring  Patient given Aspirin in the ER  Monitor cardiac enzymes ×3  Full 2-D echo with color-flow and doppler reviewed from 2 months ago   Cardiac procedure with stent placement reviewed as attached above   Keep patient NPO after midnight  Cardiology consultation in am for evaluation, further treatment recommendations and work up      Chronic systolic heart failure ([address]  Continue with p.o. diuresis  Daily weights  Strict I's and O's    Chronic medical issues . .. Continue with home meds. Monitor patient closely while admitted. Advised very close f/u with patient's PCP as an outpatient to address chronic medical issues. Repeat labs in a.m. Electrolyte replacement as per protocol. Patient will be monitored very closely on the floor. Further recommendations as per the hospital course. Patient's management will be taken over by our [facility] Team in am.  DC planning in a.m. if cleared by cardiology.     Patient  is on DVT prophylaxis  Current medications reviewed  Lab work reviewed  Radiology/Chest x-ray films reviewed  EKG

## 2020-02-14 NOTE — ED PROVIDER NOTES
[facility] CARE  eMERGENCY dEPARTMENT eNCOUnter      Pt Name: Celestina De La Vega  MRN: [id number]  [place] 1935  Date of evaluation: 2/14/2020  Provider: Nicole Chao, [address]       Chief Complaint   Patient presents with    Chest Pain     cardiac stents 2 weeks ago, described as discomfort, pt denies cough, pt has low grade fever of 100.4, pt is upset with brother in law for having to be here         HISTORY OF PRESENT ILLNESS   (Location/Symptom, Timing/Onset,Context/Setting, Quality, Duration, Modifying Factors, Severity)  Note limiting factors. Chuy Melendez a 81 y.o. female who presents to the emergency department for evaluation of chest pain. Pt presents via EMS from home for evaluation of chest pain. Pt lives with family who are present relate that patient had complaint of chest and upper back pain this morning. They tell me that she had cardiac stent placement here 2 months ago to the RCA and circumflex. She has history of COPD requiring regular use of oxygen at 3 liters per nasal prongs at home. She has had increased swelling of both feet over past week with adjustments in diuretic therapy made by cardiology last week. She has had increased shortness of breath per family. She has had recent treatment of PNA per family. She has had no fevers at home. She has had no complaint of abdominal pain. She has had no vomiting. She has had episodes of confusion similar to previous \"more frequent\" per caregivers. HPI    Nursing Notes were reviewed. REVIEW OF SYSTEMS    (2-9 systems for level 4, 10 or more for level 5)     Review of Systems   Constitutional: Negative. Respiratory: Positive for shortness of breath. Cardiovascular: Positive for chest pain. Gastrointestinal: Negative. Neurological: Positive for dizziness (per patient). Psychiatric/Behavioral: Positive for confusion and hallucinations (per patient). All other systems reviewed and are negative.       A complete review of systems was performed and is negative except as noted above in the HPI. PAST MEDICAL HISTORY     Past Medical History:   Diagnosis Date    Abdominal pain     Arthritis     CAD (coronary artery disease)     COPD (chronic obstructive pulmonary disease) (HCC)     Diarrhea     GERD (gastroesophageal reflux disease)     Hypertension     [facility]ocardial infarct ([facility] [address]     Palliative care patient 01/21/2020         SURGICAL HISTORY       Past Surgical History:   Procedure Laterality Date    CARDIAC CATHETERIZATION  12/2019    ANNABELLE Cx, mid RCA    CORONARY ANGIOPLASTY WITH STENT PLACEMENT      x2    DIAGNOSTIC CARDIAC CATH LAB PROCEDURE           CURRENT MEDICATIONS       Current Discharge Medication List      CONTINUE these medications which have NOT CHANGED    Details   furosemide (LASIX) 20 MG tablet Take 1 tablet by mouth daily  Qty: 90 tablet, Refills: 3      oxyCODONE-acetaminophen (PERCOCET)  MG per tablet Take 1 tablet by mouth every 4 hours as needed for Pain. doxycycline hyclate (VIBRA-TABS) 100 MG tablet Take 1 tablet by mouth 2 times daily for 10 days  Qty: 20 tablet, Refills: 0      guaiFENesin (MUCINEX) 600 MG extended release tablet Take 1 tablet by mouth 2 times daily for 15 days  Qty: 30 tablet, Refills: 0      ipratropium (ATROVENT HFA) 17 MCG/ACT inhaler Inhale 1 puff into the lungs every 6 hours as needed for Wheezing  Qty: 1 Inhaler, Refills: 3      ipratropium-albuterol (DUONEB) 0.5-2.5 (3) MG/3ML SOLN nebulizer solution Inhale 3 mLs into the lungs every 6 hours as needed for Shortness of Breath  Qty: 360 mL, Refills: 0      hydrocortisone (ANUSOL-HC) 2.5 % rectal cream Place rectally 2 times daily. Qty: 1 Tube, Refills: 0      polyethylene glycol (GLYCOLAX) packet Take 17 g by mouth daily as needed for Constipation or Other (No BM in 60 hours)  Qty: 527 g, Refills: 1      gabapentin (NEURONTIN) 300 MG capsule Take 300 mg by mouth 3 times daily as needed. acetylcysteine (MUCOMYST) 20 % nebulizer solution Inhale 3 mLs into the lungs 2 times daily for 10 days  Qty: 60 mL, Refills: 0      isosorbide mononitrate (IMDUR) 30 MG extended release tablet Take 1 tablet by mouth daily  Qty: 30 tablet, Refills: 3      mirtazapine (REMERON) 7.5 MG tablet Take 1 tablet by mouth nightly  Qty: 30 tablet, Refills: 3      atorvastatin (LIPITOR) 40 MG tablet Take 1 tablet by mouth nightly  Qty: 30 tablet, Refills: 3      clopidogrel (PLAVIX) 75 MG tablet Take 1 tablet by mouth daily  Qty: 30 tablet, Refills: 3      pantoprazole (PROTONIX) 40 MG tablet Take 1 tablet by mouth every morning (before breakfast)  Qty: 30 tablet, Refills: 3      aspirin 81 MG tablet Take 81 mg by mouth daily      carvedilol (COREG) 3.125 MG tablet Take 3.125 mg by mouth 2 times daily (with meals)             ALLERGIES     Lisinopril and Penicillins    FAMILY HISTORY       Family History   Problem Relation Age of Onset    Cancer Mother     No Known Problems Father           SOCIAL HISTORY       Social History     Socioeconomic History    Marital status:      Spouse name: Not on file    Number of children: Not on file    Years of education: Not on file    Highest education level: Not on file   Occupational History    Not on file   Social Needs    Financial resource strain: Not on file    Food insecurity:     Worry: Not on file     Inability: Not on file    Transportation needs:     Medical: Not on file     Non-medical: Not on file   Tobacco Use    Smoking status: Former Smoker     Packs/day: 1.00    Smokeless tobacco: Never Used    Tobacco comment: Pt quit 6 weeks ago per family report. Substance and Sexual Activity    Alcohol use: Never    Drug use: Never    Sexual activity: Not Currently     Comment: She states that she has no children.    Lifestyle    Physical activity:     Days per week: Not on file     Minutes per session: Not on file    Stress: Not on file   Relationships    Social connections:     Talks on phone: Not on file     Gets together: Not on file     Attends Rastafari service: Not on file     Active member of club or organization: Not on file     Attends meetings of clubs or organizations: Not on file     Relationship status: Not on file    Intimate partner violence:     Fear of current or ex partner: Not on file     Emotionally abused: Not on file     Physically abused: Not on file     Forced sexual activity: Not on file   Other Topics Concern    Not on file   Social History Narrative    CODE STATUS: DNR-CCA    Health Care Proxy: Mrs. Arabella Howell, cousin, [phone]         twice spouses     Has 2 daughters and 1 son    Work 28 years [employer]    Education high school    [address] [facility] [place]    Lives in a nursing home    Does not presently drive an automobile    Walks with a walker    Denies alcohol or tobacco consumption or substance usage       SCREENINGS             PHYSICAL EXAM    (up to 7 for level 4, 8 or more for level 5)     ED Triage Vitals [20 0925]   BP Temp Temp src Pulse Resp SpO2 Height Weight   (!) 106/43 100.4 °F (38 °C) -- 78 20 94 % 5' (1.524 m) 133 lb (60.3 kg)       Physical Exam  Vitals signs reviewed. 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 Right Ear: External ear normal.      Left Ear: External ear normal.   Eyes:      Conjunctiva/sclera: Conjunctivae normal.      Pupils: Pupils are equal, round, and reactive to light. Neck:      Musculoskeletal: Normal range of motion. Cardiovascular:      Rate and Rhythm: Normal rate and regular rhythm. Heart sounds: Normal heart sounds. Pulmonary:      Effort: Pulmonary effort is normal.      Comments: Decreased breath sounds throughout  Abdominal:      General: Bowel sounds are normal.      Palpations: Abdomen is soft.    Musculoskeletal: Normal normal limits   COMPREHENSIVE METABOLIC PANEL W/ REFLEX TO MG FOR LOW K - Abnormal; Notable for the following components:    Chloride 97 (*)     CO2 35 (*)     Glucose 131 (*)     BUN 31 (*)     CREATININE 1.4 (*)     GFR Non-African American 36 (*)     Calcium 8.5 (*)     Total Protein 6.2 (*)     Alb 3.0 (*)     All other components within normal limits   TROPONIN - Abnormal; Notable for the following components:    Troponin 0.05 (*)     All other components within normal limits   BRAIN NATRIURETIC PEPTIDE - Abnormal; Notable for the following components:    Pro-BNP 10,603 (*)     All other components within normal limits   TROPONIN - Abnormal; Notable for the following components:    Troponin 0.04 (*)     All other components within normal limits   RAPID INFLUENZA A/B ANTIGENS   CULTURE BLOOD #1   CULTURE BLOOD #2   AMMONIA   LACTIC ACID, PLASMA   URINE RT REFLEX TO CULTURE   TROPONIN       All other labs were within normal range or not returned as of this dictation. RE-ASSESSMENT     Discussed with hospitalist who will admit patient. EMERGENCY DEPARTMENT COURSE and DIFFERENTIALDIAGNOSIS/MDM:   Vitals:    Vitals:    02/14/20 0925 02/14/20 1245 02/14/20 1642   BP: (!) 106/43  (!) 103/56   Pulse: 78  62   Resp: 20  20   Temp: 100.4 °F (38 °C) 98.4 °F (36.9 °C) 96.9 °F (36.1 °C)   TempSrc:  Oral Temporal   SpO2: 94%  97%   Weight: 133 lb (60.3 kg)     Height: 5' (1.524 m)         MDM      CONSULTS:  IP CONSULT TO CARDIOLOGY    PROCEDURES:  Unless otherwise notedbelow, none     Procedures    FINAL IMPRESSION     1. Chest pain, unspecified type    2.  Elevated troponin          DISPOSITION/PLAN   DISPOSITION        PATIENT REFERRED TO:  DO Hector Dela Cruz  [phone]            DISCHARGE MEDICATIONS:       Current Discharge Medication List          (Pleasenote that portions of this note were completed with a voice recognition program.  Efforts were made to edit the dictations but occasionally words are mis-transcribed.)                     Leticia Driscoll, APRN  02/14/20 [id number]

## 2020-02-15 PROBLEM — R07.9 CHEST PAIN WITH MODERATE RISK FOR CARDIAC ETIOLOGY: Status: RESOLVED | Noted: 2020-01-01 | Resolved: 2020-01-01
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
## 2020-02-15 NOTE — PLAN OF CARE
Problem: Pain:  Goal: Pain level will decrease  Description  Pain level will decrease  2/15/2020 0112 by Chicho Nation RN  Outcome: Ongoing  2/14/2020 1738 by Romi Valdes RN  Outcome: Ongoing  Goal: Control of acute pain  Description  Control of acute pain  2/15/2020 0112 by Chicho Nation RN  Outcome: Ongoing  2/14/2020 1738 by Romi Valdes RN  Outcome: Ongoing  Goal: Control of chronic pain  Description  Control of chronic pain  2/15/2020 0112 by Chicho Nation RN  Outcome: Ongoing  2/14/2020 1738 by Romi Valdes RN  Outcome: Ongoing     Problem: Falls - Risk of:  Goal: Will remain free from falls  Description  Will remain free from falls  2/15/2020 0112 by Chicho Nation RN  Outcome: Ongoing  2/14/2020 1738 by Romi Valdes RN  Outcome: Ongoing  Goal: Absence of physical injury  Description  Absence of physical injury  2/15/2020 0112 by Chicho Nation RN  Outcome: Ongoing  2/14/2020 1738 by Romi Valdes RN  Outcome: Ongoing     Problem: Risk for Impaired Skin Integrity  Goal: Tissue integrity - skin and mucous membranes  Description  Structural intactness and normal physiological function of skin and  mucous membranes.   2/15/2020 0112 by Chicho Nation RN  Outcome: Ongoing  2/14/2020 1738 by oRmi Valdes RN  Outcome: Ongoing

## 2020-02-15 NOTE — DISCHARGE SUMMARY
with her cardiologist Dr. Ida Hernandez in 1 week. Patient was seen at bedside today, and the examination shows improvement since yesterday. Detailed discharge directions delivered to the patient by myself and our nursing staff, who verbalizes understanding and is very happy and satisfied with the plan. Patient has been advised to continue all medications as prescribed and advised, and f/u with PCP within 1 week. Patient is stable from medical standpoint to be discharged. Total time spent during patient evaluation and assessment, discussion with the nurse/family, addressing discharge medications/scripts and coordination of care for safe discharge was in excess of 35 minutes. OBJECTIVE:  BP (!) 118/59   Pulse 77   Temp 99.2 °F (37.3 °C) (Temporal)   Resp 20   Ht 5' (1.524 m)   Wt 133 lb (60.3 kg)   SpO2 90%   BMI 25.97 kg/m²       Heart:  S1 +S2 +0   Lungs: Clear   Abdomen: Soft, non-tender   Extremities: No edema   Neurologic: Alert and oriented   Skin: Warm and dry          Laboratory Data:  Recent Labs     02/14/20  1039 02/14/20  2325   WBC 10.9* 7.6   HGB 10.1* 9.0*    200     Recent Labs     02/14/20  1039 02/15/20  0703    142   K 3.7 4.2   CL 97* 99   CO2 35* 32*   BUN 31* 31*   CREATININE 1.4* 1.2*   GLUCOSE 131* 95     Recent Labs     02/14/20  1039   AST 25   ALT 15   BILITOT 0.5   ALKPHOS 103     Troponin T:   Recent Labs     02/14/20  1150 02/14/20  2325 02/15/20  0703   TROPONINI 0.04* 0.03 0.04*     Pro-BNP: No results for input(s): BNP in the last 72 hours. INR: No results for input(s): INR in the last 72 hours. UA:No results for input(s): NITRITE, COLORU, PHUR, LABCAST, WBCUA, RBCUA, MUCUS, TRICHOMONAS, YEAST, BACTERIA, CLARITYU, SPECGRAV, LEUKOCYTESUR, UROBILINOGEN, BILIRUBINUR, BLOODU, GLUCOSEU, AMORPHOUS in the last 72 hours. Invalid input(s): Gradie Notice  A1C: No results for input(s): LABA1C in the last 72 hours.   ABG:No results for input(s): PHART, NCJ5DEP, PO2ART, extended release tablet  Take 1 tablet by mouth daily             mirtazapine (REMERON) 7.5 MG tablet  Take 1 tablet by mouth nightly             oxyCODONE-acetaminophen (PERCOCET)  MG per tablet  Take 1 tablet by mouth every 4 hours as needed for Pain.             pantoprazole (PROTONIX) 40 MG tablet  Take 1 tablet by mouth every morning (before breakfast)             polyethylene glycol (GLYCOLAX) packet  Take 17 g by mouth daily as needed for Constipation or Other (No BM in 60 hours)                   ISSUES TO BE ADDRESSED AT HOSPITAL FOLLOW-UP VISIT:    Patient needs to follow-up with the cardiologist in 1 week    Condition on Discharge: stable  Discharge Disposition: Home    Recommended Follow Up:  Richa Guillermo DO  [facility]  [phone]          Followup Appointments Scheduled at Time of Discharge:  Future Appointments   Date Time Provider Amilcar Neely   2/21/2020 10:30 AM BOB Hedrick [facility] [place]        Discharge Instructions:   PRIMARY CARE PHYSICIAN (PCP) FOLLOW UP . .. PATIENT INSTRUCTIONS:    Please make sure you follow-up with your primary care physician within [address] [place] [address] When you call, ask for a \" Susana Leobardo Foley \" . .. and take this paperwork with you. Please fill, TODAY, all the prescriptions provided/e-scribed in the hospital upon discharge. Please take all of your new prescription meds to your primary care physician to update your medical record . .. and to get any refills. This discharge process has taken >30 minutes.     Signed Electronically:    Eloy Tenorio MD  9:41 AM 2/15/2020

## 2020-02-17 NOTE — CARE COORDINATION
appointments she doesn't like to get her out much. Advised her to contact Dr. Jayne Mccormick office and make them aware she needs her HFU, and will be getting out on Friday, and see if they could scheduled on that day. Last Lovell says patient has been eating well. Last Lovell says she was able to get discharge medications and did reconcile them today. [facility] is coming out to make visits for [facility], and PT. Patient also had some constipation issues when she got home, but they have resolved. Did instruct daughter to use Miralax or glycolax for help in keeping her regular, and her stools from becoming hard. She states she understands. No complaint or issues at this time. Contact information given, encouraged to call with any needs prn. Will follow up at a later time.    Future Appointments   Date Time Provider Amilcar Neely   2/21/2020 10:30 AM BOB Moncada [id number]       Ashley Cao RN

## 2020-02-19 NOTE — CARE COORDINATION
Johnny 45 Transitions Follow Up Call    2020    Patient: Carrington Odell  Patient : 1935   MRN: [id number]  Reason for Admission:   Discharge Date: 2/15/20 RARS: Readmission Risk Score: 32         Spoke with: Gaston Miller, patient's daughter    Care Transitions Subsequent and Final Call    Subsequent and Final Calls  Do you have any ongoing symptoms?:  Yes  Onset of Patient-reported symptoms: In the past 7 days  Patient-reported symptoms:  Weight Gain  Have your medications changed?:  No  Do you have any questions related to your medications?:  No  Do you currently have any active services?:  Yes  Are you currently active with any services?:  Home Health  Do you have any needs or concerns that I can assist you with?:  Yes  Patient-reported Needs or Concerns:  see CC note  Identified Barriers:  None  Care Transitions Interventions                          Other Interventions: Follow Up : Spoke with Gaston Miller, patient's daughter to follow up on patient. She says swelling is still present, patient has gained from 133-140, Ruth Ann, with [address] care has been out to evaluate patient today. She is going to contact patient's doctor to see what the next step is per daughter. She says patient is eating well, and vitals were good today with bp reading of 120/70. She says bowels are doing better as well. Will follow up with Reed Corbett to see who which doctor she is contacting. Will follow up with patient at a later time.    Future Appointments   Date Time Provider Amilcar Neely   2020 10:30 AM BOB Hernandez [facility] Cardio [facility]       Margy Lomeli RN

## 2020-02-19 NOTE — CARE COORDINATION
Spoke with Juvenal Miller at [facility], requested Wai Fierro to give CTN call back regarding patient and swelling. Contact info given.

## 2020-02-19 NOTE — CARE COORDINATION
Wai Fierro, [facility] nurse returned call regarding patient. Made her aware that this CTN had placed a call to [facility] making them aware of patient's status. She states she is going to call as well and try to speak to the nurse. Will follow.

## 2020-02-19 NOTE — CARE COORDINATION
Left message on voicemail of Valeria Navarro, [facility] nurse to let that patient has had a 7+ lb weight gain, legs are edematous. It is known to CTN that patient has had recent stents and is to follow up later this week with Cardiology. Need advice or orders to treat or sooner appointment if possible.

## 2020-02-20 NOTE — TELEPHONE ENCOUNTER
Message left for Dana Cruz of plan. Also spoke with patients daughter and advised that she take 40 mg of lasix today and keep her follow up appt for tomorrow. She voiced understanding.

## 2020-02-20 NOTE — CARE COORDINATION
Johnny  Transitions Follow Up Call    2020    Patient: Starr López  Patient : 1935   MRN: [id number]  Reason for Admission:   Discharge Date: 2/15/20 RARS: Readmission Risk Score: 26         Spoke with: Gian Crespo, patient    Follow Up:  Contacted patient for BPCI-A follow up. Received return phone call from patient's daughter Gian Crespo. She states her mother is not doing so well. Reports her swelling has increased and she's had a weight gain. Lasix was increased this morning. Also reports that she has increased shortness of breath. Breathing treatments have been completed which have not been effective. Patient currently on 3 L of oxygen. Advised to increase to 4 L to see if this is effective. Denies having any chest pain/discomfort. Reports color is pale. Continues to have a productive cough with yellow sputum and wheezing which has gotten worse per daughter. Gian Crespo states her mother does c/o mid back pain. Advised Lesley to call 911 if she is in any distress. She verbalized understanding. CTN will contact cardiology and PCP. Chasity Ybarra. Teto's office. Spoke with Pat Wiggins. Informed her that patient having increased SOB and productive cough with yellow sputum. Breathing treatments not effective and oxygen increased. She states lasix was increased this morning. She states PCP will have to be contacted because they did not order the breathing treatments and oxygen. Pat Wiggins will relay message to Jae Ring. Contacted PCP office. Spoke with Jonathan Barrett. Informed her that patient was discharged from the hospital on 2/15/20. She is having increased SOB and productive cough with yellow sputum which has worsened per daughter. Breathing treatments not effective and CTN advised to increase oxygen to 4 L. O2 sats unknown. Also informed her that CTN advised to go to ER if in distress. She will relay message to PCP and schedule follow up appointment.   No other questions or concerns at this time.         Future Appointments   Date Time Provider Amilcar Neely   2/21/2020 10:30 AM [facility] Kam RN

## 2020-02-20 NOTE — ED PROVIDER NOTES
[facility] EMERGENCY DEPT  EMERGENCY DEPARTMENT ENCOUNTER      Pt Name: Elle Canales  MRN: [id number]  [place] 1935  Date of evaluation: 2/20/2020  Provider: Willie Sprague MD    CHIEF COMPLAINT       Chief Complaint   Patient presents with    Leg Swelling         HISTORY OF PRESENT ILLNESS   (Location/Symptom, Timing/Onset,Context/Setting, Quality, Duration, Modifying Factors, Severity)  Note limiting factors. Elle Canales is a 81 y.o. female who presents to the emergency department with complaint of bilateral lower extremity edema which she states is been present over the last day. Initially patient had forgotten why she had come to the emergency department but remembered after she was reminded. Denies any other associated symptoms. States she does not typically have edema. Does have a history of coronary artery disease and had stents placed recently. Denies any CHF history. HPI    NursingNotes were reviewed. REVIEW OF SYSTEMS    (2-9 systems for level 4, 10 or more for level 5)     Review of Systems   Constitutional: Negative for fatigue and fever. HENT: Negative for congestion, rhinorrhea and voice change. Eyes: Negative for pain and redness. Respiratory: Negative for cough and shortness of breath. Cardiovascular: Positive for leg swelling. Negative for chest pain. Gastrointestinal: Negative for abdominal pain, diarrhea and vomiting. Endocrine: Negative. Genitourinary: Negative. Musculoskeletal: Negative for arthralgias and gait problem. Skin: Negative for rash and wound. Neurological: Negative for weakness and headaches. Hematological: Negative. Psychiatric/Behavioral: Negative. All other systems reviewed and are negative. A complete review of systems was performed and is negative except as noted above in the HPI.        PAST MEDICAL HISTORY     Past Medical History:   Diagnosis Date    Abdominal pain     Arthritis     CAD (coronary artery disease)  COPD (chronic obstructive pulmonary disease) (HCC)     Diarrhea     GERD (gastroesophageal reflux disease)     Hypertension     [facility]ocardial [facility] [address]     Palliative care patient 01/21/2020         SURGICAL HISTORY       Past Surgical History:   Procedure Laterality Date    CARDIAC CATHETERIZATION  12/2019    ANNABELLE Cx, mid RCA    CORONARY ANGIOPLASTY WITH STENT PLACEMENT      x2    DIAGNOSTIC CARDIAC CATH LAB PROCEDURE           CURRENT MEDICATIONS       Previous Medications    ACETYLCYSTEINE (MUCOMYST) 20 % NEBULIZER SOLUTION    Inhale 3 mLs into the lungs 2 times daily for 10 days    ASPIRIN 81 MG TABLET    Take 81 mg by mouth daily    ATORVASTATIN (LIPITOR) 40 MG TABLET    Take 1 tablet by mouth nightly    CARVEDILOL (COREG) 3.125 MG TABLET    Take 3.125 mg by mouth 2 times daily (with meals)    CLOPIDOGREL (PLAVIX) 75 MG TABLET    Take 1 tablet by mouth daily    FUROSEMIDE (LASIX) 20 MG TABLET    Take 1 tablet by mouth daily    GABAPENTIN (NEURONTIN) 300 MG CAPSULE    Take 300 mg by mouth 3 times daily as needed. HYDROCORTISONE (ANUSOL-HC) 2.5 % RECTAL CREAM    Place rectally 2 times daily. IPRATROPIUM (ATROVENT HFA) 17 MCG/ACT INHALER    Inhale 1 puff into the lungs every 6 hours as needed for Wheezing    IPRATROPIUM-ALBUTEROL (DUONEB) 0.5-2.5 (3) MG/3ML SOLN NEBULIZER SOLUTION    Inhale 3 mLs into the lungs every 6 hours as needed for Shortness of Breath    ISOSORBIDE MONONITRATE (IMDUR) 30 MG EXTENDED RELEASE TABLET    Take 1 tablet by mouth daily    MIRTAZAPINE (REMERON) 7.5 MG TABLET    Take 1 tablet by mouth nightly    OXYCODONE-ACETAMINOPHEN (PERCOCET)  MG PER TABLET    Take 1 tablet by mouth every 4 hours as needed for Pain.     PANTOPRAZOLE (PROTONIX) 40 MG TABLET    Take 1 tablet by mouth every morning (before breakfast)    POLYETHYLENE GLYCOL (GLYCOLAX) PACKET    Take 17 g by mouth daily as needed for Constipation or Other (No BM in 60 hours)       ALLERGIES     Lisinopril and Vitals   BP Temp Temp Source Pulse Resp SpO2 Height Weight   20 1136 20 1135 20 1135 20 1136 20 1136 20 1136 -- 20 1136   (!) 123/97 99.7 °F (37.6 °C) Oral 82 22 90 %  133 lb (60.3 kg)       Physical Exam  Vitals signs and nursing note reviewed. Constitutional:       General: She is not in acute distress. Appearance: She is well-developed. She is not diaphoretic. HENT:      Head: Normocephalic and atraumatic. Eyes:      General: No scleral icterus. Neck:      Vascular: No JVD. Cardiovascular:      Rate and Rhythm: Normal rate and regular rhythm. Pulses:           Radial pulses are 2+ on the right side and 2+ on the left side. Dorsalis pedis pulses are 2+ on the right side and 2+ on the left side. Heart sounds: Murmur present. Systolic murmur present with a grade of 2/6. No friction rub. No gallop. Pulmonary:      Effort: Pulmonary effort is normal. No accessory muscle usage or respiratory distress. Breath sounds: Normal breath sounds. No stridor. No decreased breath sounds, wheezing, rhonchi or rales. Chest:      Chest wall: No tenderness. Abdominal:      General: There is no distension. Palpations: Abdomen is soft. Tenderness: There is no abdominal tenderness. There is no guarding or rebound. Musculoskeletal: Normal range of motion. General: No deformity. Right lower le+ Pitting Edema present. Left lower le+ Pitting Edema present. Skin:     General: Skin is warm and dry. Coloration: Skin is not pale. Findings: No erythema. Neurological:      Mental Status: She is alert and oriented to person, place, and time. GCS: GCS eye subscore is 4. GCS verbal subscore is 5. GCS motor subscore is 6. Cranial Nerves: No cranial nerve deficit. Motor: No abnormal muscle tone.       Coordination: Coordination normal.   Psychiatric:         Behavior: Behavior normal.         Judgment: (Please note that portions of this note were completed with a voice recognition program.  Efforts were made to edit the dictations butoccasionally words are mis-transcribed.)    Moses Ahuja MD (electronically signed)  AttendingEmergency Physician          Moses Pablo MD  02/20/20 [id number]

## 2020-02-26 PROBLEM — R06.02 SHORTNESS OF BREATH: Status: RESOLVED | Noted: 2019-07-08 | Resolved: 2020-02-26

## 2020-03-09 NOTE — PROGRESS NOTES
[facility], [address] [place] [address]  Phone: [phone]  Fax: [phone]    OFFICE VISIT:  3/9/2020    Elle Canales - : 1935    Reason For Visit:  Ijeoma Rod is a 81 y.o. female who is here for Follow-up (Has been retaining alot of fluids in both legs and feet. ); Coronary Artery Disease; and Leg Swelling     Diagnosis Orders   1. Acute on chronic diastolic (congestive) heart failure (HCC)  Basic Metabolic Panel    Brain Natriuretic Peptide    CBC   2. CAD in native artery     3. Other emphysema ([address]     4. Essential hypertension     5. Anemia, unspecified type     6. Mixed hyperlipidemia       HPI  Patient is here for follow-up status post heart cath with drug-eluting stents to the circumflex and mid RCA. She has had previous stenting in the past.  She has significant COPD and problems with chronic respiratory failure. She has been back to the emergency room several times with fluid retention, lower extremity edema, weight gain. She has been told to increase her Lasix to 40 mg daily. Her last visit to the ER was 2020. She continues to feel very fatigued. Her weight continues to climb. She is chronically short of breath, but denies orthopnea or PND. She denies dark or black stools. Most recent hemoglobin was 8.4 on 2020. Ama Stafford DO is PCP.   Elle Canales has the following history as recorded in [facility]:    Patient Active Problem List    Diagnosis Date Noted    Unstable angina [facility])      Priority: High    CAD in native artery 2020    S/P coronary artery stent placement 2020    Chronic systolic heart failure ([address] 2020    Other emphysema ([address] 2020    Palliative care patient 2020    Acute hypoxemic respiratory failure ([address] 2020    Pneumonia 2019    Bronchial obstruction 2019    NSTEMI (non-ST elevated myocardial infarction) ([address] 2019     Past (PROTONIX) 40 MG tablet Take 1 tablet by mouth every morning (before breakfast) 30 tablet 3    aspirin 81 MG tablet Take 81 mg by mouth daily      carvedilol (COREG) 3.125 MG tablet Take 3.125 mg by mouth 2 times daily (with meals)      acetylcysteine (MUCOMYST) 20 % nebulizer solution Inhale 3 mLs into the lungs 2 times daily for 10 days 60 mL 0     No current facility-administered medications for this visit. Allergies: Lisinopril and Penicillins    Review of Systems  Review of Systems   Constitutional: Positive for fatigue and unexpected weight change. Negative for activity change, diaphoresis and fever. HENT: Negative for facial swelling and nosebleeds. Eyes: Negative for redness and visual disturbance. Respiratory: Positive for shortness of breath (chronic). Negative for cough, chest tightness and wheezing. Cardiovascular: Positive for leg swelling (worsening). Negative for chest pain and palpitations. Gastrointestinal: Negative for abdominal pain, nausea and vomiting. Endocrine: Negative for cold intolerance and heat intolerance. Genitourinary: Negative for dysuria and hematuria. Musculoskeletal: Negative for arthralgias and myalgias. Skin: Negative for pallor and rash. Neurological: Negative for dizziness, seizures, syncope, weakness and light-headedness. Hematological: Does not bruise/bleed easily. Psychiatric/Behavioral: Negative for agitation. The patient is not nervous/anxious. Objective  Vital Signs - /66   Pulse 72   Ht 5' (1.524 m)   Wt 139 lb (63 kg)   SpO2 96%   BMI 27.15 kg/m²    Wt Readings from Last 3 Encounters:   03/09/20 139 lb (63 kg)   02/20/20 133 lb (60.3 kg)   02/14/20 133 lb (60.3 kg)      Physical Exam  Vitals signs and nursing note reviewed. Constitutional:       General: She is not in acute distress. Appearance: She is well-developed. She is ill-appearing. She is not diaphoretic.       Comments: Deconditioned   HENT:      Head: calcification of the posterior leaflet with reduced leaflet   mobility. MVA by PHT is calculated to be 1.47 cm2, mild mitral stenosis is present. Mild mitral regurgitation is present. The aortic valve is trileaflet, sclerotic with good leaflet separation. No evidence of aortic stenosis; trivial aortic regurgitation. Tricuspid valve is structurally normal.   Severely dilated left atrium. Severe concentric left ventricular hypertrophy. Left ventricular ejection fraction is estimated at 50%. E/A flow reversal pattern consistent with Grade I diastolic dysfunction. No regional wall motion abnormalities. Lab Results   Component Value Date    CHOL 161 02/15/2020    TRIG 125 02/15/2020    HDL 84 02/15/2020    LDLCALC 52 02/15/2020     Lab Results   Component Value Date    ALT 17 02/20/2020    AST 29 02/20/2020     Lab Results   Component Value Date     03/09/2020    K 4.1 03/09/2020     03/09/2020    CO2 29 03/09/2020    BUN 15 03/09/2020    CREATININE 1.2 (H) 03/09/2020    GLUCOSE 121 (H) 03/09/2020    CALCIUM 9.1 03/09/2020    PROT 6.0 (L) 02/20/2020    LABALBU 2.4 (L) 02/20/2020    BILITOT 0.3 02/20/2020    ALKPHOS 160 (H) 02/20/2020    AST 29 02/20/2020    ALT 17 02/20/2020    LABGLOM 43 (A) 03/09/2020     EKG shows sinus rhythm with left bundle branch block and occasional PVC    Assessment:     Diagnosis Orders   1. Acute on chronic diastolic (congestive) heart failure (HCC)  Basic Metabolic Panel    Brain Natriuretic Peptide    CBC   2. CAD in native artery     3. Other emphysema ([facility] [address]     4. Essential hypertension     5. Anemia, unspecified type     6. Mixed hyperlipidemia       Hospital records reviewed    Acute on chronic diastolic heart failure NYHA class III, stage C- patient appears fluid overloaded. Plan will be Lasix 80 mg IV. Will obtain labs today, CBC, BMP, BNP. Continue Lasix 40 mg daily. Currently on a beta-blocker only. Consider adding ACE/ARB in the future.

## 2020-03-09 NOTE — FLOWSHEET NOTE
Pt here for lasix injection after being seen in cardiology upstairs and unable to obtain iv access. 80 mg lasix given as ordsered iv and bnp obtained and sent.   Iv dc'd and pt d\c'd with family member

## 2020-03-10 NOTE — TELEPHONE ENCOUNTER
Patient was in the office yesterday with a heart failure exacerbation. She was given IV Lasix 80 mg. Please check on her and see how she is doing today. I have reviewed her recent labs and would like to also add spironolactone 25 mg daily to take in addition to her Lasix 40 mg daily. She has a follow-up appointment next week and we will get repeat labs at that time.   P

## 2020-03-11 NOTE — TELEPHONE ENCOUNTER
Spoke with patients daughter this morning. She said her home weight was 139 on Monday, Tuesday she didn't weigh her, and today she is 134. She said swelling has gone down some and her SOA has improved. Reminded her of appt on Monday. Advised that you want to start patient on spironolactone and she voiced understanding. Med pended for you to send.

## 2020-03-20 NOTE — CARE COORDINATION
Johnny 45 Transitions Follow Up Call    3/20/2020    Patient: Omar Luna  Patient : 1935   MRN: [id number]    Discharge Date: 20 RARS: Readmission Risk Score: 32         Spoke with: Daughter Lynnette Eastman Subsequent and Final Call    Subsequent and Final Calls  Do you have any ongoing symptoms?:  Yes  Onset of Patient-reported symptoms: In the past 7 days  Patient-reported symptoms:  Weakness  Have your medications changed?:  No  Do you have any questions related to your medications?:  No  Do you currently have any active services?:  No  Are you currently active with any services?:  Home Health  Do you have any needs or concerns that I can assist you with?:  No  Identified Barriers:  None  Care Transitions Interventions  Other Interventions:          CTN spoke to pt's daughter Adama Orozco with pt in background. Stated pt has been fatigued and weak, has not been getting up and moving around house as much lately. Pt c/o sore throat yesterday, improved today. Pt is using inhaler prn, advised to swish and spit after usage. Stated SOB is baseline, edema in feet has improved after daughter increased incline for pt when seated. Stated she forgets sometimes to have pt weigh herself, got on scale while on phone with CTN and weight was stable at 139#. Reminded to have pt take extra Lasix for weight gain of 2-3 lbs/day per last OV with PCP. Stated pt is limiting sodium and fluids to 6 cups/day. Advised to continue daily weights, take extra lasix prn and try and have pt ambulate occasionally throughout the day. Reviewed COVID-19 precautions and when to call Dr chyna RAYO. Jose M Nurse, RN BSN   Care Transitions Nurse  [phone]     Follow Up  No future appointments.     Jose M Nurse, RN

## 2020-08-25 LAB — HOLD SPECIMEN: NORMAL

## 2020-09-21 NOTE — ED NOTES
Bed: 02-A  Expected date:   Expected time:   Means of arrival:   Comments:  IDA Camilo RN  09/21/20 1114

## 2020-09-21 NOTE — ED PROVIDER NOTES
[facility]  eMERGENCY dEPARTMENT eNCOUnter      Pt Name: Hailey Ac  MRN: [id number]  Tjgfhermelinda 1935  Date of evaluation: 9/21/2020  Provider: Roberto Cameron MD    [address] [place] [address]       Chief Complaint   Patient presents with    Code     pt presents to ED code 80. total down time 45 min. 4 epi 1 shock. Asystole upoun arival.          HISTORY OF PRESENT ILLNESS   (Location/Symptom, Timing/Onset,Context/Setting, Quality, Duration, Modifying Factors, Severity)  Note limiting factors. Hailey Ac is a 81 y.o. female who presents to the emergency department due to code 80. No report whether or not there was bystander CPR. EMS said that total downtime was 45 minutes prior to arrival.  Report was the patient had altered mental status and then had witnessed arrest by family. EMS said the patient has been pulseless since they arrived at patient's home. Said that she has remained in asystole with exception of a brief run of V. fib and shocked x1. Went back to asystole soon after this. Has had epi x4 prior to arrival.  Igel airway in place. HPI    NursingNotes were reviewed. REVIEW OF SYSTEMS    (2-9 systems for level 4, 10 or more for level 5)     Review of Systems   Unable to perform ROS: Patient unresponsive       A complete review of systems was performed and is negative except as noted above in the HPI.        PAST MEDICAL HISTORY     Past Medical History:   Diagnosis Date    Abdominal pain     Arthritis     CAD (coronary artery disease)     COPD (chronic obstructive pulmonary disease) (HCC)     Diarrhea     GERD (gastroesophageal reflux disease)     Hypertension     Myocardial infarct ([facility] [address]     Palliative care patient 01/21/2020         SURGICAL HISTORY       Past Surgical History:   Procedure Laterality Date    CARDIAC CATHETERIZATION  12/2019    ANNABELLE Cx, mid RCA    CORONARY ANGIOPLASTY WITH STENT PLACEMENT      x2    DIAGNOSTIC CARDIAC CATH LAB PROCEDURE CURRENT MEDICATIONS       Previous Medications    ACETYLCYSTEINE (MUCOMYST) 20 % NEBULIZER SOLUTION    Inhale 3 mLs into the lungs 2 times daily for 10 days    ASPIRIN 81 MG TABLET    Take 81 mg by mouth daily    ATORVASTATIN (LIPITOR) 40 MG TABLET    Take 1 tablet by mouth nightly    CARVEDILOL (COREG) 3.125 MG TABLET    Take 3.125 mg by mouth 2 times daily (with meals)    CLOPIDOGREL (PLAVIX) 75 MG TABLET    Take 1 tablet by mouth daily    FUROSEMIDE (LASIX) 40 MG TABLET    TAKE 1 TABLET BY MOUTH DAILY    GABAPENTIN (NEURONTIN) 300 MG CAPSULE    Take 300 mg by mouth 3 times daily as needed. HYDROCORTISONE (ANUSOL-HC) 2.5 % RECTAL CREAM    Place rectally 2 times daily. IPRATROPIUM (ATROVENT HFA) 17 MCG/ACT INHALER    Inhale 1 puff into the lungs every 6 hours as needed for Wheezing    IPRATROPIUM-ALBUTEROL (DUONEB) 0.5-2.5 (3) MG/3ML SOLN NEBULIZER SOLUTION    Inhale 3 mLs into the lungs every 6 hours as needed for Shortness of Breath    ISOSORBIDE MONONITRATE (IMDUR) 30 MG EXTENDED RELEASE TABLET    Take 1 tablet by mouth daily    MIRTAZAPINE (REMERON) 7.5 MG TABLET    Take 1 tablet by mouth nightly    OXYCODONE-ACETAMINOPHEN (PERCOCET)  MG PER TABLET    Take 1 tablet by mouth every 4 hours as needed for Pain. PANTOPRAZOLE (PROTONIX) 40 MG TABLET    Take 1 tablet by mouth every morning (before breakfast)    SPIRONOLACTONE (ALDACTONE) 25 MG TABLET    TAKE 1 TABLET BY MOUTH DAILY       ALLERGIES     Lisinopril and Penicillins    FAMILY HISTORY       Family History   Problem Relation Age of Onset    Cancer Mother     No Known Problems Father           SOCIAL HISTORY       Social History     Socioeconomic History    Marital status:       Spouse name: Not on file    Number of children: Not on file    Years of education: Not on file    Highest education level: Not on file   Occupational History    Not on file   Social Needs    Financial resource strain: Not on file   [employer] insecurity     Worry: Not on file     Inability: Not on file    Transportation needs     Medical: Not on file     Non-medical: Not on file   Tobacco Use    Smoking status: Former Smoker     Packs/day: 1.00    Smokeless tobacco: Never Used    Tobacco comment: Pt quit 6 weeks ago per family report. Substance and Sexual Activity    Alcohol use: Never    Drug use: Never    Sexual activity: Not Currently     Comment: She states that she has no children. Lifestyle    Physical activity     Days per week: Not on file     Minutes per session: Not on file    Stress: Not on file   Relationships    Social connections     Talks on phone: Not on file     Gets together: Not on file     Attends Christian service: Not on file     Active member of club or organization: Not on file     Attends meetings of clubs or organizations: Not on file     Relationship status: Not on file    Intimate partner violence     Fear of current or ex partner: Not on file     Emotionally abused: Not on file     Physically abused: Not on file     Forced sexual activity: Not on file   Other Topics Concern    Not on file   Social History Narrative    CODE STATUS: [id number]    Health Care Proxy: Mrs. Espinoza Law, cousin, [phone]         twice spouses     Has 2 daughters and 1 son    Work 28 years [employer]    Education high school    [address] [facility]    Lives in a nursing home    Does not presently drive an automobile    [address] with a walker    Denies alcohol or tobacco consumption or substance usage       SCREENINGS             PHYSICAL EXAM    (up to 7 for level 4, 8 or more for level 5)     ED Triage Vitals   BP Temp Temp src Pulse Resp SpO2 Height Weight   -- -- -- -- -- -- -- --       Physical Exam  Vitals signs reviewed. Constitutional:       Appearance: She is well-developed. Comments: Unresponsive   HENT:      Head: Normocephalic and atraumatic.       Mouth/Throat:      Mouth: Mucous membranes are moist. Comments: Igel airway in place  Eyes:      General: No scleral icterus. Comments: Pupils fixed and dilated   Neck:      Musculoskeletal: Normal range of motion and neck supple. Vascular: No JVD. Cardiovascular:      Comments: Pulseless. No heart sounds. Pulmonary:      Comments: Equal breath sounds with bagging through Igel airway with no sounds over the epigastrium  Abdominal:      General: There is no distension. Palpations: Abdomen is soft. Musculoskeletal:         General: No deformity. Skin:     General: Skin is warm and dry. Capillary Refill: Capillary refill takes more than 3 seconds. Neurological:      GCS: GCS eye subscore is 1. GCS verbal subscore is 1. GCS motor subscore is 1. Psychiatric:      Comments: Unable to assess         EMERGENCY DEPARTMENT COURSE and DIFFERENTIALDIAGNOSIS/MDM:   Vitals: There were no vitals filed for this visit. MDM  Patient remained in asystole/pulseless. Despite continued resuscitative efforts there was no return of spontaneous circulation. Bedside ultrasound used to evaluate heart which showed no cardiac activity. Code called at 11:12 AM    CONSULTS:  None    PROCEDURES:  Unless otherwise notedbelow, none     Procedures    FINAL IMPRESSION     1.  Cardiac arrest [facility])          DISPOSITION/PLAN   DISPOSITION  2020 11:23:01 AM      PATIENT REFERRED TO:  @FUP@    DISCHARGE MEDICATIONS:  New Prescriptions    No medications on file          (Please note that portions of this note were completed with a voice recognition program.  Efforts were made to edit the dictations butoccasionally words are mis-transcribed.)    Marcio Sutton MD (electronically signed)  AttendingEmergency Physician          Marcio Sutton MD  [phone]